# Patient Record
Sex: FEMALE | Race: WHITE | Employment: FULL TIME | URBAN - METROPOLITAN AREA
[De-identification: names, ages, dates, MRNs, and addresses within clinical notes are randomized per-mention and may not be internally consistent; named-entity substitution may affect disease eponyms.]

---

## 2017-01-12 RX ORDER — FENOFIBRATE 145 MG/1
145 TABLET, COATED ORAL DAILY
Qty: 90 TABLET | Refills: 3 | Status: SHIPPED | OUTPATIENT
Start: 2017-01-12 | End: 2019-05-14 | Stop reason: ALTCHOICE

## 2017-02-14 ENCOUNTER — OFFICE VISIT (OUTPATIENT)
Dept: CARDIOLOGY CLINIC | Age: 52
End: 2017-02-14

## 2017-02-14 VITALS
SYSTOLIC BLOOD PRESSURE: 98 MMHG | RESPIRATION RATE: 15 BRPM | HEART RATE: 84 BPM | HEIGHT: 65 IN | WEIGHT: 179 LBS | OXYGEN SATURATION: 99 % | DIASTOLIC BLOOD PRESSURE: 56 MMHG | BODY MASS INDEX: 29.82 KG/M2

## 2017-02-14 DIAGNOSIS — I10 ESSENTIAL HYPERTENSION: ICD-10-CM

## 2017-02-14 DIAGNOSIS — I50.32 DIASTOLIC CHF, CHRONIC (HCC): Primary | ICD-10-CM

## 2017-02-14 DIAGNOSIS — I27.20 PULMONARY HYPERTENSION (HCC): ICD-10-CM

## 2017-02-14 PROCEDURE — 99214 OFFICE O/P EST MOD 30 MIN: CPT | Performed by: NURSE PRACTITIONER

## 2017-02-14 RX ORDER — SPIRONOLACTONE 50 MG/1
25 TABLET, FILM COATED ORAL DAILY
Qty: 90 TABLET | Refills: 3 | COMMUNITY
Start: 2017-02-14 | End: 2017-08-21 | Stop reason: ALTCHOICE

## 2017-02-15 ENCOUNTER — TELEPHONE (OUTPATIENT)
Dept: CARDIOLOGY CLINIC | Age: 52
End: 2017-02-15

## 2017-02-15 RX ORDER — VALSARTAN 40 MG/1
20 TABLET ORAL NIGHTLY
Qty: 30 TABLET | Refills: 3 | COMMUNITY
Start: 2017-02-15 | End: 2017-06-08 | Stop reason: ALTCHOICE

## 2017-02-16 RX ORDER — CARVEDILOL 3.12 MG/1
3.12 TABLET ORAL 2 TIMES DAILY WITH MEALS
Qty: 60 TABLET | Refills: 3 | Status: SHIPPED | OUTPATIENT
Start: 2017-02-16 | End: 2017-03-03 | Stop reason: DRUGHIGH

## 2017-03-03 ENCOUNTER — TELEPHONE (OUTPATIENT)
Dept: CARDIOLOGY CLINIC | Age: 52
End: 2017-03-03

## 2017-03-03 RX ORDER — CARVEDILOL 6.25 MG/1
6.25 TABLET ORAL 2 TIMES DAILY WITH MEALS
Qty: 60 TABLET | Refills: 2 | Status: SHIPPED
Start: 2017-03-03 | End: 2018-08-24 | Stop reason: SDUPTHER

## 2017-05-08 ENCOUNTER — OFFICE VISIT (OUTPATIENT)
Dept: NEUROLOGY | Age: 52
End: 2017-05-08

## 2017-05-08 VITALS
HEART RATE: 78 BPM | WEIGHT: 175 LBS | HEIGHT: 65 IN | DIASTOLIC BLOOD PRESSURE: 72 MMHG | BODY MASS INDEX: 29.16 KG/M2 | SYSTOLIC BLOOD PRESSURE: 97 MMHG

## 2017-05-08 DIAGNOSIS — E11.42 DIABETIC POLYNEUROPATHY ASSOCIATED WITH TYPE 2 DIABETES MELLITUS (HCC): ICD-10-CM

## 2017-05-08 PROCEDURE — 99213 OFFICE O/P EST LOW 20 MIN: CPT | Performed by: PSYCHIATRY & NEUROLOGY

## 2017-05-08 RX ORDER — TOPIRAMATE 50 MG/1
TABLET, FILM COATED ORAL
Qty: 60 TABLET | Refills: 5 | Status: SHIPPED | OUTPATIENT
Start: 2017-05-08 | End: 2017-06-07 | Stop reason: ALTCHOICE

## 2017-05-18 ENCOUNTER — OFFICE VISIT (OUTPATIENT)
Dept: CARDIOLOGY CLINIC | Age: 52
End: 2017-05-18

## 2017-05-18 VITALS
HEART RATE: 78 BPM | DIASTOLIC BLOOD PRESSURE: 62 MMHG | BODY MASS INDEX: 28.96 KG/M2 | HEIGHT: 65 IN | WEIGHT: 173.8 LBS | OXYGEN SATURATION: 99 % | RESPIRATION RATE: 15 BRPM | SYSTOLIC BLOOD PRESSURE: 120 MMHG

## 2017-05-18 DIAGNOSIS — I50.32 CHRONIC DIASTOLIC HEART FAILURE (HCC): Primary | ICD-10-CM

## 2017-05-18 DIAGNOSIS — E78.2 MIXED HYPERLIPIDEMIA: ICD-10-CM

## 2017-05-18 DIAGNOSIS — I10 ESSENTIAL HYPERTENSION: ICD-10-CM

## 2017-05-18 PROCEDURE — 99214 OFFICE O/P EST MOD 30 MIN: CPT | Performed by: NURSE PRACTITIONER

## 2017-05-30 ENCOUNTER — TELEPHONE (OUTPATIENT)
Dept: CARDIOLOGY CLINIC | Age: 52
End: 2017-05-30

## 2017-06-08 RX ORDER — TOPIRAMATE 50 MG/1
50 TABLET, FILM COATED ORAL 2 TIMES DAILY
COMMUNITY
Start: 2017-06-08 | End: 2017-12-19 | Stop reason: SDUPTHER

## 2017-06-09 ENCOUNTER — TELEPHONE (OUTPATIENT)
Dept: CARDIOLOGY CLINIC | Age: 52
End: 2017-06-09

## 2017-06-12 ENCOUNTER — HOSPITAL ENCOUNTER (OUTPATIENT)
Dept: OTHER | Age: 52
Discharge: HOME | End: 2017-06-12
Attending: NURSE PRACTITIONER | Admitting: NURSE PRACTITIONER

## 2017-06-12 ENCOUNTER — OFFICE VISIT (OUTPATIENT)
Dept: CARDIOLOGY CLINIC | Age: 52
End: 2017-06-12

## 2017-06-12 VITALS
HEART RATE: 75 BPM | SYSTOLIC BLOOD PRESSURE: 112 MMHG | DIASTOLIC BLOOD PRESSURE: 70 MMHG | WEIGHT: 176 LBS | BODY MASS INDEX: 29.29 KG/M2

## 2017-06-12 DIAGNOSIS — R06.02 SHORTNESS OF BREATH: ICD-10-CM

## 2017-06-12 DIAGNOSIS — I10 ESSENTIAL HYPERTENSION: ICD-10-CM

## 2017-06-12 DIAGNOSIS — I50.32 DIASTOLIC CHF, CHRONIC (HCC): ICD-10-CM

## 2017-06-12 DIAGNOSIS — R06.02 SHORTNESS OF BREATH: Primary | ICD-10-CM

## 2017-06-12 DIAGNOSIS — E78.2 MIXED HYPERLIPIDEMIA: ICD-10-CM

## 2017-06-12 DIAGNOSIS — R07.9 CHEST PAIN, UNSPECIFIED TYPE: ICD-10-CM

## 2017-06-12 LAB
ANION GAP SERPL CALCULATED.3IONS-SCNC: 14 MMOL/L (ref 3–16)
BUN BLDV-MCNC: 22 MG/DL (ref 7–20)
CALCIUM SERPL-MCNC: 9.2 MG/DL (ref 8.3–10.6)
CHLORIDE BLD-SCNC: 99 MMOL/L (ref 99–110)
CO2: 25 MMOL/L (ref 21–32)
CREAT SERPL-MCNC: 1 MG/DL (ref 0.6–1.1)
GFR AFRICAN AMERICAN: >60
GFR NON-AFRICAN AMERICAN: 59
GLUCOSE BLD-MCNC: 92 MG/DL (ref 70–99)
POTASSIUM SERPL-SCNC: 3.6 MMOL/L (ref 3.5–5.1)
PRO-BNP: 98 PG/ML (ref 0–124)
SODIUM BLD-SCNC: 138 MMOL/L (ref 136–145)

## 2017-06-12 PROCEDURE — 93000 ELECTROCARDIOGRAM COMPLETE: CPT | Performed by: NURSE PRACTITIONER

## 2017-06-12 PROCEDURE — 99214 OFFICE O/P EST MOD 30 MIN: CPT | Performed by: NURSE PRACTITIONER

## 2017-06-13 ENCOUNTER — TELEPHONE (OUTPATIENT)
Dept: CARDIOLOGY CLINIC | Age: 52
End: 2017-06-13

## 2017-06-16 ENCOUNTER — TELEPHONE (OUTPATIENT)
Dept: CARDIOLOGY CLINIC | Age: 52
End: 2017-06-16

## 2017-08-21 ENCOUNTER — OFFICE VISIT (OUTPATIENT)
Dept: CARDIOLOGY CLINIC | Age: 52
End: 2017-08-21

## 2017-08-21 VITALS
DIASTOLIC BLOOD PRESSURE: 72 MMHG | HEART RATE: 84 BPM | WEIGHT: 173 LBS | BODY MASS INDEX: 28.82 KG/M2 | SYSTOLIC BLOOD PRESSURE: 100 MMHG | HEIGHT: 65 IN

## 2017-08-21 DIAGNOSIS — E78.00 PURE HYPERCHOLESTEROLEMIA: ICD-10-CM

## 2017-08-21 DIAGNOSIS — R06.02 SOB (SHORTNESS OF BREATH): ICD-10-CM

## 2017-08-21 DIAGNOSIS — I50.32 DIASTOLIC CHF, CHRONIC (HCC): Primary | ICD-10-CM

## 2017-08-21 DIAGNOSIS — I10 ESSENTIAL HYPERTENSION: ICD-10-CM

## 2017-08-21 PROCEDURE — 99214 OFFICE O/P EST MOD 30 MIN: CPT | Performed by: INTERNAL MEDICINE

## 2017-08-21 RX ORDER — EZETIMIBE 10 MG/1
10 TABLET ORAL DAILY
Qty: 30 TABLET | Refills: 11 | Status: SHIPPED | OUTPATIENT
Start: 2017-08-21 | End: 2018-09-07 | Stop reason: SDUPTHER

## 2017-08-21 RX ORDER — SPIRONOLACTONE 50 MG/1
50 TABLET, FILM COATED ORAL DAILY
Qty: 30 TABLET | Refills: 11 | Status: SHIPPED | OUTPATIENT
Start: 2017-08-21 | End: 2018-03-14 | Stop reason: SDUPTHER

## 2017-08-21 RX ORDER — VALSARTAN 40 MG/1
20 TABLET ORAL NIGHTLY
Qty: 30 TABLET | Refills: 11 | Status: SHIPPED | OUTPATIENT
Start: 2017-08-21 | End: 2018-08-24

## 2017-08-21 RX ORDER — VALSARTAN 40 MG/1
20 TABLET ORAL NIGHTLY
Qty: 30 TABLET | Refills: 11 | Status: SHIPPED | OUTPATIENT
Start: 2017-08-21 | End: 2017-08-21 | Stop reason: SDUPTHER

## 2017-08-25 ENCOUNTER — HOSPITAL ENCOUNTER (OUTPATIENT)
Dept: NON INVASIVE DIAGNOSTICS | Age: 52
Discharge: HOME | End: 2017-08-25
Attending: INTERNAL MEDICINE | Admitting: INTERNAL MEDICINE

## 2017-08-25 DIAGNOSIS — R06.02 SHORTNESS OF BREATH: ICD-10-CM

## 2017-08-25 LAB
LEFT VENTRICULAR EJECTION FRACTION HIGH VALUE: 60 %
LEFT VENTRICULAR EJECTION FRACTION MODE: NORMAL
LV EF: 55 %
LV EF: 58 %
LVEF MODALITY: NORMAL

## 2017-08-30 ENCOUNTER — TELEPHONE (OUTPATIENT)
Dept: CARDIOLOGY CLINIC | Age: 52
End: 2017-08-30

## 2017-10-06 ENCOUNTER — OFFICE VISIT (OUTPATIENT)
Dept: CARDIOLOGY CLINIC | Age: 52
End: 2017-10-06

## 2017-10-06 VITALS
HEART RATE: 80 BPM | SYSTOLIC BLOOD PRESSURE: 118 MMHG | DIASTOLIC BLOOD PRESSURE: 76 MMHG | BODY MASS INDEX: 28.32 KG/M2 | HEIGHT: 65 IN | WEIGHT: 170 LBS

## 2017-10-06 DIAGNOSIS — I50.32 DIASTOLIC CHF, CHRONIC (HCC): Primary | ICD-10-CM

## 2017-10-06 DIAGNOSIS — R06.02 SOB (SHORTNESS OF BREATH): ICD-10-CM

## 2017-10-06 DIAGNOSIS — I27.20 PULMONARY HYPERTENSION (HCC): ICD-10-CM

## 2017-10-06 DIAGNOSIS — I10 ESSENTIAL HYPERTENSION: ICD-10-CM

## 2017-10-06 DIAGNOSIS — E87.6 HYPOKALEMIA: ICD-10-CM

## 2017-10-06 DIAGNOSIS — R06.02 SHORTNESS OF BREATH: ICD-10-CM

## 2017-10-06 DIAGNOSIS — E78.2 MIXED HYPERLIPIDEMIA: ICD-10-CM

## 2017-10-06 DIAGNOSIS — E78.00 PURE HYPERCHOLESTEROLEMIA: ICD-10-CM

## 2017-10-06 PROCEDURE — 99214 OFFICE O/P EST MOD 30 MIN: CPT | Performed by: INTERNAL MEDICINE

## 2017-10-06 RX ORDER — POTASSIUM CHLORIDE 750 MG/1
TABLET, EXTENDED RELEASE ORAL
Qty: 180 TABLET | Refills: 3 | Status: SHIPPED | OUTPATIENT
Start: 2017-10-06 | End: 2018-10-21 | Stop reason: SDUPTHER

## 2017-10-06 NOTE — LETTER
[address]  [address]  [address] [address]  Phone: [phone]  Fax: [phone]    Tc Cross MD        November 9, 2017     [address] [facility] [address]    Patient: Jannice Prader  MR Number: [id number]  YOB: 1965  Date of Visit: 10/6/2017        [address]   Advanced Heart Failure/Pulmonary Hypertension  Cardiac Follow up       Jannice Prader  YOB: 1965    Date of Visit:  10/6/17      Chief Complaint   Patient presents with    Fatigue     extremely fatigued all week     History of Present Illness:  Jannice Prader is a 47 y.o. female with past medical history significant for dCHF, PHTN, NIRALI, HTN and CKD. She was last seen in office 5/18 and Valsartan discontinued secondary symptomatic hypotension with it's use (97/52). Today, she states that she has lost weight ~ twenty pounds over the past year according to record in epic but continues to have shortness of breath with exertion. In the past her valsartan was stopped for a short time and her heart rate got too high and it was restarted. Last visit ~ 1 1/2 months ago  (8/21) her potassium was 3.4 on the low side and we increased her spironolactone to 50 mg daily, her 9/1/ labs shows that potassium was up to 3.5. She sees Dr. Abelardo Snyder neurology for headaches. She is still fatigue. She is taking potassium 10 meq daily. Remains on all medications, no change since last visit including continuing valsartan and lasix. Her valsartan was stopped for a short time and her heart rate got too high and it was restarted.        Allergies   Allergen Reactions    Lisinopril Other (See Comments)     cough    Statins Other (See Comments)     Light-headedness, cp, fast HR    Dilaudid [Hydromorphone Hcl] Nausea And Vomiting    Skelaxin [Metaxalone] Palpitations     Heart beats fast     Current Outpatient Prescriptions Medication Sig Dispense Refill    ezetimibe (ZETIA) 10 MG tablet Take 1 tablet by mouth daily 30 tablet 11    valsartan (DIOVAN) 40 MG tablet Take 0.5 tablets by mouth nightly 30 tablet 11    spironolactone (ALDACTONE) 50 MG tablet Take 1 tablet by mouth daily 30 tablet 11    topiramate (TOPAMAX) 50 MG tablet Take 1 tablet by mouth 2 times daily      carvedilol (COREG) 6.25 MG tablet Take 1 tablet by mouth 2 times daily (with meals) 60 tablet 2    fenofibrate (TRICOR) 145 MG tablet Take 1 tablet by mouth daily 90 tablet 3    potassium chloride (KLOR-CON M) 10 MEQ extended release tablet Take 1 tablet by mouth daily (Patient taking differently: Take 10 mEq by mouth daily Indications: this week taking 20 mEq daily ) 90 tablet 3    furosemide (LASIX) 40 MG tablet Take 1 tablet by mouth daily (Patient taking differently: Take 20 mg by mouth Five times weekly ) 30 tablet 11    nortriptyline (PAMELOR) 10 MG capsule Take 10 mg by mouth 2 times daily      Liraglutide (VICTOZA) 18 MG/3ML SOPN SC injection Inject 1.8 mg into the skin daily       metformin (GLUCOPHAGE) 1000 MG tablet Take 1 tablet by mouth 2 times daily (with meals). 60 tablet 6     No current facility-administered medications for this visit.         Past Medical History:   Diagnosis Date    Abdominal pain 9/9/2010    Arthritis     Asthma     Chest pain 6/22/2011    CHF (congestive heart failure) (HCC)     Diastolic heart failure     5/17/10-heart worsening-Dr. Peter Singleton    Dyspnea     Heart failure, diastolic (HCC)     Hemoptysis 5/14/2012    Hyperlipidemia     Hypertension     Nausea & vomiting     PONV (postoperative nausea and vomiting)     Pulmonary hypertension (HCC)     Pulmonary hypertension due to left ventricular diastolic dysfunction (HCC)     Renal failure     Sleep apnea     SUPPOSED TO USE CPAP    Vertigo      Past Surgical History:   Procedure Laterality Date    BREAST CYST EXCISION Right 03/28/2017 end of march  Sexual activity: Yes     Partners: Male     Other Topics Concern    Not on file     Social History Narrative       Review of Systems:   · Constitutional: there has been no unanticipated weight loss. There's been no change in energy level, sleep pattern, or activity level. · Eyes: No visual changes or diplopia. No scleral icterus. · ENT: No Headaches, hearing loss or vertigo. No mouth sores or sore throat. · Cardiovascular: Reviewed in HPI  · Respiratory: No cough or wheezing, no sputum production. No hematemesis. · Gastrointestinal: No abdominal pain, appetite loss, blood in stools. No change in bowel or bladder habits. · Genitourinary: No dysuria, trouble voiding, or hematuria. · Musculoskeletal:  No gait disturbance, weakness or joint complaints. · Integumentary: No rash or pruritis. · Neurological: No headache, diplopia, change in muscle strength, numbness or tingling. No change in gait, balance, coordination, mood, affect, memory, mentation, behavior. · Psychiatric: No anxiety, no depression. · Endocrine: No malaise, fatigue or temperature intolerance. No excessive thirst, fluid intake, or urination. No tremor. · Hematologic/Lymphatic: No abnormal bruising or bleeding, blood clots or swollen lymph nodes. · Allergic/Immunologic: No nasal congestion or hives.     Physical Examination:      Wt Readings from Last 3 Encounters:   08/21/17 173 lb (78.5 kg)   06/12/17 176 lb (79.8 kg)   05/18/17 173 lb 12.8 oz (78.8 kg)     BP Readings from Last 3 Encounters:   08/21/17 100/72   06/12/17 112/70   05/18/17 120/62     Constitutional and General Appearance:   WD/WN in NAD  HEENT:  NC/AT  Skin:  Warm, dry  Respiratory:  · Normal excursion and expansion without use of accessory muscles  · Resp Auscultation: Normal breath sounds without dullness  Cardiovascular:  · The apical impulses not displaced  · Heart tones are crisp and normal  · Cervical veins are not engorged 4. CAD patient on anti-platelet: NA  5. CAD patient on STATIN therapy:  Yes  6. Patient with CHF and aFib on anticoagulation:  NA      I appreciate the opportunity of cooperating in the care of this patient. Dameon Chatman M.D., [facility] [place]           If you have questions, please do not hesitate to call me. I look forward to following Pravin Dietrich along with you.     Sincerely,        Altagracia Shane MD

## 2017-10-06 NOTE — PROGRESS NOTES
[address]   Advanced Heart Failure/Pulmonary Hypertension  Cardiac Follow up       Shahid Salas  YOB: 1965    Date of Visit:  10/6/17      Chief Complaint   Patient presents with    Fatigue     extremely fatigued all week     History of Present Illness:  Shahid Salas is a 47 y.o. female with past medical history significant for dCHF, PHTN, NIRALI, HTN and CKD. She was last seen in office 5/18 and Valsartan discontinued secondary symptomatic hypotension with it's use (97/52). Today, she states that she has lost weight ~ twenty pounds over the past year according to record in epic but continues to have shortness of breath with exertion. In the past her valsartan was stopped for a short time and her heart rate got too high and it was restarted. Last visit ~ 1 1/2 months ago  (8/21) her potassium was 3.4 on the low side and we increased her spironolactone to 50 mg daily, her 9/1/ labs shows that potassium was up to 3.5. She sees Dr. Danica Carrion neurology for headaches. She is still fatigue. She is taking potassium 10 meq daily. Remains on all medications, no change since last visit including continuing valsartan and lasix. Her valsartan was stopped for a short time and her heart rate got too high and it was restarted.        Allergies   Allergen Reactions    Lisinopril Other (See Comments)     cough    Statins Other (See Comments)     Light-headedness, cp, fast HR    Dilaudid [Hydromorphone Hcl] Nausea And Vomiting    Skelaxin [Metaxalone] Palpitations     Heart beats fast     Current Outpatient Prescriptions   Medication Sig Dispense Refill    ezetimibe (ZETIA) 10 MG tablet Take 1 tablet by mouth daily 30 tablet 11    valsartan (DIOVAN) 40 MG tablet Take 0.5 tablets by mouth nightly 30 tablet 11    spironolactone (ALDACTONE) 50 MG tablet Take 1 tablet by mouth daily 30 tablet 11    topiramate (TOPAMAX) 50 MG tablet Take 1 tablet by mouth 2 times daily      carvedilol (COREG) 6.25 MG tablet Take 1 tablet by mouth 2 times daily (with meals) 60 tablet 2    fenofibrate (TRICOR) 145 MG tablet Take 1 tablet by mouth daily 90 tablet 3    potassium chloride (KLOR-CON M) 10 MEQ extended release tablet Take 1 tablet by mouth daily (Patient taking differently: Take 10 mEq by mouth daily Indications: this week taking 20 mEq daily ) 90 tablet 3    furosemide (LASIX) 40 MG tablet Take 1 tablet by mouth daily (Patient taking differently: Take 20 mg by mouth Five times weekly ) 30 tablet 11    nortriptyline (PAMELOR) 10 MG capsule Take 10 mg by mouth 2 times daily      Liraglutide (VICTOZA) 18 MG/3ML SOPN SC injection Inject 1.8 mg into the skin daily       metformin (GLUCOPHAGE) 1000 MG tablet Take 1 tablet by mouth 2 times daily (with meals). 60 tablet 6     No current facility-administered medications for this visit.         Past Medical History:   Diagnosis Date    Abdominal pain 2010    Arthritis     Asthma     Chest pain 2011    CHF [facility]congestive heart failure) ([facility])     Diastolic heart failure     5/17/10-heart worsening-Dr. Hyatt Handsshefali    Dyspnea     Heart failure, diastolic (HCC)     Hemoptysis 2012    Hyperlipidemia     Hypertension     Nausea & vomiting     PONV (postoperative nausea and vomiting)     Pulmonary hypertension ([address]     Pulmonary hypertension due to left ventricular diastolic dysfunction ([address]     Renal failure     Sleep apnea     SUPPOSED TO USE CPAP    Vertigo      Past Surgical History:   Procedure Laterality Date    BREAST CYST EXCISION Right 2017    end     BREAST SURGERY      breast reduction    BRONCHOSCOPY  12    BRONCHOSCOPY  2016    w/ washing     SECTION      CHOLECYSTECTOMY      HERNIA REPAIR      HYSTERECTOMY  2015    LAPAROSCOPIC APPENDECTOMY  6/3/11    LAPAROSCOPY  2011    operative, with lysis of adhesions    NEUROMA SURGERY      OTHER SURGICAL HISTORY      3 rt heart caths and 2 left heart caths    SALPINGO-OOPHORECTOMY  6/3/11    Laparoscopic Right    SHOULDER SURGERY      UPPER GASTROINTESTINAL ENDOSCOPY  9/13/11    biopsy    UPPER GASTROINTESTINAL ENDOSCOPY  9/14/2011    EUS     Family History   Problem Relation Age of Onset    Cancer Father      lung    Hypertension Father     High Blood Pressure Father     Hypertension Brother     High Blood Pressure Brother     Hypertension Mother    Karen Fuentes Other Mother      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/2009    Heart Disease Mother     High Blood Pressure Mother     Cancer Paternal Grandmother     Hypertension Other      aunt    Stroke Maternal Grandfather     Cancer Maternal Aunt      breast    High Blood Pressure Maternal Aunt     Cancer Paternal Aunt      lung    Cancer Paternal Uncle      brain    Heart Disease Maternal Grandmother     High Blood Pressure Maternal Grandmother     Heart Disease Paternal Grandfather     Diabetes Neg Hx     Osteoporosis Neg Hx     Thyroid Disease Neg Hx     Anesth Problems Neg Hx     Malig Hyperten Neg Hx     Hypotension Neg Hx     Malig Hypertherm Neg Hx     Pseudochol. Deficiency Neg Hx      Social History     Social History    Marital status:      Spouse name: N/A    Number of children: N/A    Years of education: N/A     Occupational History    Not on file. Social History Main Topics    Smoking status: Never Smoker    Smokeless tobacco: Never Used    Alcohol use 0.0 oz/week     0 Standard drinks or equivalent per week      Comment: rarely    Drug use: No    Sexual activity: Yes     Partners: Male     Other Topics Concern    Not on file     Social History Narrative       Review of Systems:   · Constitutional: there has been no unanticipated weight loss. There's been no change in energy level, sleep pattern, or activity level. · Eyes: No visual changes or diplopia. No scleral icterus. · ENT: No Headaches, hearing loss or vertigo.  No mouth sores or sore throat. · Cardiovascular: Reviewed in HPI  · Respiratory: No cough or wheezing, no sputum production. No hematemesis. · Gastrointestinal: No abdominal pain, appetite loss, blood in stools. No change in bowel or bladder habits. · Genitourinary: No dysuria, trouble voiding, or hematuria. · Musculoskeletal:  No gait disturbance, weakness or joint complaints. · Integumentary: No rash or pruritis. · Neurological: No headache, diplopia, change in muscle strength, numbness or tingling. No change in gait, balance, coordination, mood, affect, memory, mentation, behavior. · Psychiatric: No anxiety, no depression. · Endocrine: No malaise, fatigue or temperature intolerance. No excessive thirst, fluid intake, or urination. No tremor. · Hematologic/Lymphatic: No abnormal bruising or bleeding, blood clots or swollen lymph nodes. · Allergic/Immunologic: No nasal congestion or hives. Physical Examination:      Wt Readings from Last 3 Encounters:   08/21/17 173 lb (78.5 kg)   06/12/17 176 lb (79.8 kg)   05/18/17 173 lb 12.8 oz (78.8 kg)     BP Readings from Last 3 Encounters:   08/21/17 100/72   06/12/17 112/70   05/18/17 120/62     Constitutional and General Appearance:   WD/WN in NAD  HEENT:  NC/AT  Skin:  Warm, dry  Respiratory:  · Normal excursion and expansion without use of accessory muscles  · Resp Auscultation: Normal breath sounds without dullness  Cardiovascular:  · The apical impulses not displaced  · Heart tones are crisp and normal  · Cervical veins are not engorged  · The carotid upstroke is normal in amplitude and contour without delay or bruit  · JVP less than 8 cm H2O  RRR with nl S1 and S2 without m,r,g  · Peripheral pulses are symmetrical and full  · There is no clubbing, cyanosis of the extremities.   · No edema  · Femoral Arteries: 2+ and equal  · Pedal Pulses: 2+ and equal   Neck:  · JVP less than 8 cm H2O  · No thyromegaly  Abdomen:  · No masses or tenderness  · Liver/Spleen: No Abnormalities Noted  Neurological/Psychiatric:  · Alert and oriented in all spheres  · Moves all extremities well  · Exhibits normal gait balance and coordination  · No abnormalities of mood, affect, memory, mentation, or behavior are noted     Echo 8/26/2016:  Summary  Normal left ventricle size, wall thickness and systolic function with an EF of 55%. Reversal of E/A inflow velocities across the mitral valve suggesting impaired left ventricular relaxation.     Cardiac Cath 5/8/14:  LM-normal  LAD-normal  Cx- normal  RCA-normal, dominant   LVEF-55%     Hemodynamics: RA mean 4  RV 32/0  PA 32/11 mean 19  PAW 10/9 mean 5  Thermal: C.O. 4.06 and C.I. 2.05  Glenn: C.O. 4.96 and C.I. 2.51         Assessment:  1. Chronic diastolic CHF. NYHA class III. Does not appear grossly volume overloaded. 2. Dyspnea/chest discomfort. Breathing unchanged but stable. 3. Hypertension. Borderline low. Denies dizziness of LH.    4.  Hyperlipidemia. Intolerant of statins. On Zetia and fenofibrate.  on recent labs. Wexploring if she is candidate for PCSK9 inhibitors. 5. Hypokalemia--Will increase potassium 10 meq to twice daily. Repeat bmp in 2 months. Plan: Stable cardiac status. Increase potassium to10 meq from once daily to twice daily. Bmp in two months. Return to office in four months. QUALITY MEASURES  1. Tobacco Cessation Counseling: NA  2. Retake of BP if >140/90:   NA  3. Documentation to PCP/referring for new patient:  Sent to PCP at close of office visit  4. CAD patient on anti-platelet: NA  5. CAD patient on STATIN therapy:  Yes  6. Patient with CHF and aFib on anticoagulation:  NA      I appreciate the opportunity of cooperating in the care of this patient.     Susi Quezada M.D., [facility] [place]

## 2017-10-06 NOTE — PATIENT INSTRUCTIONS
Increase potassium to 10 meq twice daily. Bmp in two months, slip given. Return to office in four months.

## 2017-10-06 NOTE — MR AVS SNAPSHOT
After Visit Summary             Harper Rossana   10/6/2017 10:15 AM   Office Visit    Description:  Female : 1965   Provider:  Capri Enriquez MD   Department:  [address] and Future Appointments         Below is a list of your follow-up and future appointments. This may not be a complete list as you may have made appointments directly with providers that we are not aware of or your providers may have made some for you. Please call your providers to confirm appointments. It is important to keep your appointments. Please bring your current insurance card, photo ID, co-pay, and all medication bottles to your appointment. If self-pay, payment is expected at the time of service. Your To-Do List     Future Appointments Provider Department Dept Phone    2017 4:00 PM Bard Jon MD [facility] Neurology [phone]    Please arrive 15 minutes prior to appointment, bring photo ID and insurance card. Follow-Up    Return in about 4 months (around 2018). Information from Your Visit        Department     Name Address Phone Fax    [address] Cardiology - [facility] [address] [facility]  [address] [facility] [address]  [facility] [address] [phone]      You Were Seen for:         Comments    [facility]   [[id number][facility]         Vital Signs     Blood Pressure Pulse Height Weight Body Mass Index Smoking Status    118/76 80 5' 5\" (1.651 m) 170 lb (77.1 kg) 28.29 kg/m2 Never Smoker      Additional Information about your Body Mass Index (BMI)           Your BMI as listed above is considered overweight (25.0-29.9). BMI is an estimate of body fat, calculated from your height and weight. The higher your BMI, the greater your risk of heart disease, high blood pressure, type 2 diabetes, stroke, gallstones, arthritis, sleep apnea, and certain cancers. BMI is not perfect.   It may overestimate body fat in athletes and Yearly Flu Vaccine (1) 9/1/2017    Mammograms are recommended every 2 years for low/average risk patients aged 48 - 69, and every year for high risk patients per updated national guidelines. However these guidelines can be individualized by your provider. 12/22/2018            [employer] Signup           Our records indicate that you have an active [employer] account. You can view your After Visit Summary by going to [email or web address] and logging in with your [email or web address] username and password. If you don't have a [email or web address] username and password but a parent or guardian has access to your record, the parent or guardian should login with their own [email or web address] username and password and access your record to view the After Visit Summary. Additional Information  If you have questions, please contact the physician practice where you receive care. Remember, [employer] is NOT to be used for urgent needs. For medical emergencies, dial 911. For questions regarding your [employer] account call [phone]. If you have a clinical question, please call your doctor's office.

## 2017-10-09 RX ORDER — FUROSEMIDE 40 MG/1
TABLET ORAL
Qty: 30 TABLET | Refills: 10 | Status: SHIPPED | OUTPATIENT
Start: 2017-10-09 | End: 2018-09-02 | Stop reason: SDUPTHER

## 2017-10-24 ENCOUNTER — TELEPHONE (OUTPATIENT)
Dept: CARDIOLOGY CLINIC | Age: 52
End: 2017-10-24

## 2017-10-24 NOTE — TELEPHONE ENCOUNTER
Pt called to adv that orthopedics office will be faxing over a release form for her to have rotator cuff surgery. Pt wants to know if it could possibly be filled out today so that she can get appt sched. Please call to adv thank you.

## 2017-11-09 NOTE — COMMUNICATION BODY
6.25 MG tablet Take 1 tablet by mouth 2 times daily (with meals) 60 tablet 2    fenofibrate (TRICOR) 145 MG tablet Take 1 tablet by mouth daily 90 tablet 3    potassium chloride (KLOR-CON M) 10 MEQ extended release tablet Take 1 tablet by mouth daily (Patient taking differently: Take 10 mEq by mouth daily Indications: this week taking 20 mEq daily ) 90 tablet 3    furosemide (LASIX) 40 MG tablet Take 1 tablet by mouth daily (Patient taking differently: Take 20 mg by mouth Five times weekly ) 30 tablet 11    nortriptyline (PAMELOR) 10 MG capsule Take 10 mg by mouth 2 times daily      Liraglutide (VICTOZA) 18 MG/3ML SOPN SC injection Inject 1.8 mg into the skin daily       metformin (GLUCOPHAGE) 1000 MG tablet Take 1 tablet by mouth 2 times daily (with meals). 60 tablet 6     No current facility-administered medications for this visit.         Past Medical History:   Diagnosis Date    Abdominal pain 2010    Arthritis     Asthma     Chest pain 2011    [facility]     Diastolic heart failure     5/17/10-heart worsening-Dr. Maria R Rivers    Dyspnea     Heart failure, diastolic (HCC)     Hemoptysis 2012    Hyperlipidemia     Hypertension     Nausea & vomiting     PONV (postoperative nausea and vomiting)     Pulmonary hypertension ([address]     Pulmonary hypertension due to left ventricular diastolic dysfunction ([address]     Renal failure     Sleep apnea     SUPPOSED TO USE CPAP    Vertigo      Past Surgical History:   Procedure Laterality Date    BREAST CYST EXCISION Right 2017    end     BREAST SURGERY      breast reduction    BRONCHOSCOPY  12    BRONCHOSCOPY  2016    w/ washing     SECTION      CHOLECYSTECTOMY      HERNIA REPAIR      HYSTERECTOMY  2015    LAPAROSCOPIC APPENDECTOMY  6/3/11    LAPAROSCOPY  2011    operative, with lysis of adhesions    NEUROMA SURGERY      OTHER SURGICAL HISTORY      3 rt heart Abnormalities Noted  Neurological/Psychiatric:  · Alert and oriented in all spheres  · Moves all extremities well  · Exhibits normal gait balance and coordination  · No abnormalities of mood, affect, memory, mentation, or behavior are noted     Echo 8/26/2016:  Summary  Normal left ventricle size, wall thickness and systolic function with an EF of 55%. Reversal of E/A inflow velocities across the mitral valve suggesting impaired left ventricular relaxation.     Cardiac Cath 5/8/14:  LM-normal  LAD-normal  Cx- normal  RCA-normal, dominant   LVEF-55%     Hemodynamics: RA mean 4  RV 32/0  PA 32/11 mean 19  PAW 10/9 mean 5  Thermal: C.O. 4.06 and C.I. 2.05  Glenn: C.O. 4.96 and C.I. 2.51         Assessment:  1. Chronic diastolic CHF. NYHA class III. Does not appear grossly volume overloaded. 2. Dyspnea/chest discomfort. Breathing unchanged but stable. 3. Hypertension. Borderline low. Denies dizziness of LH.    4.  Hyperlipidemia. Intolerant of statins. On Zetia and fenofibrate.  on recent labs. Wexploring if she is candidate for PCSK9 inhibitors. 5. Hypokalemia--Will increase potassium 10 meq to twice daily. Repeat bmp in 2 months. Plan: Stable cardiac status. Increase potassium to10 meq from once daily to twice daily. Bmp in two months. Return to office in four months. QUALITY MEASURES  1. Tobacco Cessation Counseling: NA  2. Retake of BP if >140/90:   NA  3. Documentation to PCP/referring for new patient:  Sent to PCP at close of office visit  4. CAD patient on anti-platelet: NA  5. CAD patient on STATIN therapy:  Yes  6. Patient with CHF and aFib on anticoagulation:  NA      I appreciate the opportunity of cooperating in the care of this patient.     Cathryn Simmons M.D., [facility] [place]

## 2017-12-19 ENCOUNTER — OFFICE VISIT (OUTPATIENT)
Dept: NEUROLOGY | Age: 52
End: 2017-12-19

## 2017-12-19 VITALS
HEIGHT: 65 IN | BODY MASS INDEX: 28.32 KG/M2 | DIASTOLIC BLOOD PRESSURE: 75 MMHG | WEIGHT: 170 LBS | HEART RATE: 83 BPM | SYSTOLIC BLOOD PRESSURE: 105 MMHG

## 2017-12-19 PROCEDURE — 99213 OFFICE O/P EST LOW 20 MIN: CPT | Performed by: PSYCHIATRY & NEUROLOGY

## 2017-12-19 RX ORDER — TOPIRAMATE 50 MG/1
TABLET, FILM COATED ORAL
Qty: 60 TABLET | Refills: 5 | Status: SHIPPED | OUTPATIENT
Start: 2017-12-19 | End: 2018-08-26 | Stop reason: SDUPTHER

## 2017-12-19 NOTE — PROGRESS NOTES
Blood Pressure Maternal Aunt     Cancer Paternal Aunt      lung    Cancer Paternal Uncle      brain    Heart Disease Maternal Grandmother     High Blood Pressure Maternal Grandmother     Heart Disease Paternal Grandfather     Diabetes Neg Hx     Osteoporosis Neg Hx     Thyroid Disease Neg Hx     Anesth Problems Neg Hx     Malig Hyperten Neg Hx     Hypotension Neg Hx     Malig Hypertherm Neg Hx     Pseudochol. Deficiency Neg Hx      Social History     Social History    Marital status:      Spouse name: N/A    Number of children: N/A    Years of education: N/A     Social History Main Topics    Smoking status: Never Smoker    Smokeless tobacco: Never Used    Alcohol use 0.0 oz/week      Comment: rarely    Drug use: No    Sexual activity: Yes     Partners: Male     Other Topics Concern    None     Social History Narrative    None        Objective:  Exam:  /75   Pulse 83   Ht 5' 5\" (1.651 m)   Wt 170 lb (77.1 kg)   BMI 28.29 kg/m²   This is a well-nourished patient in no acute distress  Patient is awake, alert and oriented x3. Speech is normal.  Pupils are equal round reacting to light. Extraocular movements intact. Face symmetrical. Tongue midline. Motor exam shows normal symmetrical strength. Deep tendon reflexes decreased in the lower extremities. Plantar reflexes downgoing. Sensory exam shows decreased light touch in the distal lower extremities Coordination normal. Gait normal. No carotid bruit. No neck stiffness.         Data :  LABS:  General Labs:    CBC:   Lab Results   Component Value Date    WBC 5.5 04/08/2016    RBC 4.11 04/08/2016    HGB 12.0 04/08/2016    HCT 35.1 04/08/2016    MCV 85.4 04/08/2016    MCH 29.1 04/08/2016    MCHC 34.1 04/08/2016    RDW 12.4 04/08/2016     04/08/2016    MPV 8.9 04/08/2016     BMP:    Lab Results   Component Value Date     06/12/2017    K 3.6 06/12/2017    CL 99 06/12/2017    CO2 25 06/12/2017    BUN 22 06/12/2017    LABALBU 4.4 04/08/2016    CREATININE 1.0 06/12/2017    CALCIUM 9.2 06/12/2017    GFRAA >60 06/12/2017    GFRAA >60 04/02/2013    LABGLOM 59 06/12/2017    GLUCOSE 92 06/12/2017    GLUCOSE 192 08/19/2015     RADIOLOGY REVIEW:  MRI brain    Impression :  Migraine headaches improved  Rebound headaches, resolved  Diabetic neuropathy  MRI brain shows nonspecific white matter changes probably related to small vessel disease    Plan :  Discussed with patient  Continue to avoid daily pain medication  Continue to avoid chocolate and nitrite-containing foods  Continue topiramate 50 mg twice daily  Side effects were discussed. Strict control of diabetes was discussed  I will see her back in 6 months for follow-up. At that time we will discuss if we can slowly taper and stop the medication if her headaches remain well controlled        Please note a portion of  this chart was generated using dragon dictation software. Although every effort was made to ensure the accuracy of this automated transcription, some errors in transcription may have occurred.

## 2018-02-09 ENCOUNTER — OFFICE VISIT (OUTPATIENT)
Dept: CARDIOLOGY CLINIC | Age: 53
End: 2018-02-09

## 2018-02-09 VITALS
SYSTOLIC BLOOD PRESSURE: 80 MMHG | WEIGHT: 171 LBS | HEIGHT: 65 IN | HEART RATE: 80 BPM | BODY MASS INDEX: 28.49 KG/M2 | DIASTOLIC BLOOD PRESSURE: 60 MMHG

## 2018-02-09 DIAGNOSIS — R06.02 SOB (SHORTNESS OF BREATH): Chronic | ICD-10-CM

## 2018-02-09 DIAGNOSIS — I10 ESSENTIAL HYPERTENSION: Chronic | ICD-10-CM

## 2018-02-09 DIAGNOSIS — I50.32 DIASTOLIC CHF, CHRONIC (HCC): Primary | Chronic | ICD-10-CM

## 2018-02-09 DIAGNOSIS — E78.00 PURE HYPERCHOLESTEROLEMIA: Chronic | ICD-10-CM

## 2018-02-09 PROCEDURE — 99214 OFFICE O/P EST MOD 30 MIN: CPT | Performed by: INTERNAL MEDICINE

## 2018-02-09 NOTE — LETTER
[address] [address] [place] [address] [address]  Phone: [phone]  Fax: [phone]    Ryan Lake MD        February 14, 2018     [address] [address]    Patient: Queen Terese  MR Number: [id number]  YOB: 1965  Date of Visit: 2/9/2018        [address]   Advanced Heart Failure/Pulmonary Hypertension  Cardiac Follow up       Queen Terese  YOB: 1965    Date of Visit:  2/9/18      Chief Complaint   Patient presents with    Fatigue     Recent shoulder surgery--taking demeral for pain    Shortness of Breath     hard to take deep breaths     History of Present Illness:  Queen Terese is a 47 y.o. female with past medical history significant for dCHF, PHTN, NIRALI, HTN and CKD. She was last seen in office 5/18 and Valsartan discontinued secondary symptomatic hypotension with it's use (97/52). She had shoulder surgery  1/10/18, her nerve block did not work, she has had sever pain. She is doing physical therapy. She takes demerol at bedtime for the past five days but it doesn't help. Since surgery she she thinks that her shoulder is still \"frozen\". She had labwork a week ago at [facility] and we reviewed them, her potassium is better and cholesterol is improving with her ldl, TG are down to normal now. She takes Zetia and tolerating it well. All other labs look good. Her BP is on the low side and we think iyt maybe due to pain medication. She continues to have shortness of breath with exertion, no worse. She sees Dr. Daria Buerger neurology for headaches. She is still fatigue. She is taking potassium 10 meq daily. Remains on all medications, no change since last visit including continuing valsartan and lasix. Her valsartan was stopped for a short time and her heart rate got too high and it was restarted.        Allergies   Allergen Reactions  Renal failure     Sleep apnea     SUPPOSED TO USE CPAP    Vertigo      Past Surgical History:   Procedure Laterality Date    BREAST CYST EXCISION Right 2017    end of march    BREAST SURGERY      breast reduction    BRONCHOSCOPY  12    BRONCHOSCOPY  2016    w/ washing     SECTION      CHOLECYSTECTOMY      HERNIA REPAIR      HYSTERECTOMY  2015    LAPAROSCOPIC APPENDECTOMY  6/3/11    LAPAROSCOPY  2011    operative, with lysis of adhesions    NEUROMA SURGERY      OTHER SURGICAL HISTORY      3 rt heart caths and 2 left heart caths    SALPINGO-OOPHORECTOMY  6/3/11    Laparoscopic Right    SHOULDER SURGERY      SHOULDER SURGERY Right 2018    UPPER GASTROINTESTINAL ENDOSCOPY  11    biopsy    UPPER GASTROINTESTINAL ENDOSCOPY  2011    EUS     Family History   Problem Relation Age of Onset    Cancer Father      lung    Hypertension Father     High Blood Pressure Father     Hypertension Brother     High Blood Pressure Brother     Hypertension Mother    [facility] Other Mother      abdominal aortic aneursym - passes 2009    Heart Disease Mother     High Blood Pressure Mother     Cancer Paternal Grandmother     Hypertension Other      aunt    Stroke Maternal Grandfather     Cancer Maternal Aunt      breast    High Blood Pressure Maternal Aunt     Cancer Paternal Aunt      lung    Cancer Paternal Uncle      brain    Heart Disease Maternal Grandmother     High Blood Pressure Maternal Grandmother     Heart Disease Paternal Grandfather     Diabetes Neg Hx     Osteoporosis Neg Hx     Thyroid Disease Neg Hx     Anesth Problems Neg Hx     Malig Hyperten Neg Hx     Hypotension Neg Hx     Malig Hypertherm Neg Hx     Pseudochol. Deficiency Neg Hx      Social History     Social History    Marital status:      Spouse name: N/A    Number of children: N/A    Years of education: N/A     Occupational History    Not on file. · Resp Auscultation: Normal breath sounds without dullness  Cardiovascular:  · The apical impulses not displaced  · Heart tones are crisp and normal  · Cervical veins are not engorged  · The carotid upstroke is normal in amplitude and contour without delay or bruit  · JVP less than 8 cm H2O  RRR with nl S1 and S2 without m,r,g  · Peripheral pulses are symmetrical and full  · There is no clubbing, cyanosis of the extremities. · No edema  · Femoral Arteries: 2+ and equal  · Pedal Pulses: 2+ and equal   Neck:  · JVP less than 8 cm H2O  · No thyromegaly  Abdomen:  · No masses or tenderness  · Liver/Spleen: No Abnormalities Noted  Neurological/Psychiatric:  · Alert and oriented in all spheres  · Moves all extremities well  · Exhibits normal gait balance and coordination  · No abnormalities of mood, affect, memory, mentation, or behavior are noted     Echo 8/26/2016:  Summary  Normal left ventricle size, wall thickness and systolic function with an EF of 55%. Reversal of E/A inflow velocities across the mitral valve suggesting impaired left ventricular relaxation.     Cardiac Cath 5/8/14:  LM-normal  LAD-normal  Cx- normal  RCA-normal, dominant   LVEF-55%     Hemodynamics: RA mean 4  RV 32/0  PA 32/11 mean 19  PAW 10/9 mean 5  Thermal: C.O. 4.06 and C.I. 2.05  Glenn: C.O. 4.96 and C.I. 2.51         Assessment:  1. Chronic diastolic CHF. NYHA class III. Does not appear grossly volume overloaded. 2. Dyspnea/chest discomfort. Breathing unchanged but stable. 3. Hypertension. Borderline low. Denies dizziness of LH.    4.  Hyperlipidemia. Intolerant of statins. On Zetia and fenofibrate.  on recent labs. Wexploring if she is candidate for PCSK9 inhibitors. 5. Hypokalemia--Will increase potassium 10 meq to twice daily. Repeat bmp in 2 months. Plan: Stable cardiac status. Same medications. Fasting labs bmp, bnp, cbcd, liopid before next visit in 6 months.          QUALITY MEASURES

## 2018-02-14 NOTE — COMMUNICATION BODY
been no change in energy level, sleep pattern, or activity level. · Eyes: No visual changes or diplopia. No scleral icterus. · ENT: No Headaches, hearing loss or vertigo. No mouth sores or sore throat. · Cardiovascular: Reviewed in HPI  · Respiratory: No cough or wheezing, no sputum production. No hematemesis. · Gastrointestinal: No abdominal pain, appetite loss, blood in stools. No change in bowel or bladder habits. · Genitourinary: No dysuria, trouble voiding, or hematuria. · Musculoskeletal:  No gait disturbance, weakness or joint complaints. · Integumentary: No rash or pruritis. · Neurological: No headache, diplopia, change in muscle strength, numbness or tingling. No change in gait, balance, coordination, mood, affect, memory, mentation, behavior. · Psychiatric: No anxiety, no depression. · Endocrine: No malaise, fatigue or temperature intolerance. No excessive thirst, fluid intake, or urination. No tremor. · Hematologic/Lymphatic: No abnormal bruising or bleeding, blood clots or swollen lymph nodes. · Allergic/Immunologic: No nasal congestion or hives. Physical Examination:      Wt Readings from Last 3 Encounters:   02/09/18 171 lb (77.6 kg)   12/19/17 170 lb (77.1 kg)   10/06/17 170 lb (77.1 kg)     BP Readings from Last 3 Encounters:   02/09/18 80/60   12/19/17 105/75   10/06/17 118/76     Constitutional and General Appearance:   WD/WN in NAD  HEENT:  NC/AT  Skin:  Warm, dry  Respiratory:  · Normal excursion and expansion without use of accessory muscles  · Resp Auscultation: Normal breath sounds without dullness  Cardiovascular:  · The apical impulses not displaced  · Heart tones are crisp and normal  · Cervical veins are not engorged  · The carotid upstroke is normal in amplitude and contour without delay or bruit  · JVP less than 8 cm H2O  RRR with nl S1 and S2 without m,r,g  · Peripheral pulses are symmetrical and full  · There is no clubbing, cyanosis of the extremities.   · No

## 2018-03-14 DIAGNOSIS — R06.02 SOB (SHORTNESS OF BREATH): ICD-10-CM

## 2018-03-14 DIAGNOSIS — E78.00 PURE HYPERCHOLESTEROLEMIA: ICD-10-CM

## 2018-03-14 DIAGNOSIS — I10 ESSENTIAL HYPERTENSION: ICD-10-CM

## 2018-03-14 RX ORDER — SPIRONOLACTONE 50 MG/1
50 TABLET, FILM COATED ORAL DAILY
Qty: 30 TABLET | Refills: 11 | Status: SHIPPED | OUTPATIENT
Start: 2018-03-14 | End: 2019-01-24 | Stop reason: SDUPTHER

## 2018-03-14 NOTE — TELEPHONE ENCOUNTER
LF 8/21/17 #30, 11RF    LOV 2/9/18  FU scheduled 8/24/18  Labs 2/2/18  Fasting labs due before next office visit

## 2018-03-14 NOTE — TELEPHONE ENCOUNTER
Medication Refill    When was your last appointment with cardiology? 2/9/18  (if 1year or longer, please schedule an appointment)    Medication needing refilled:spironolactone (ALDACTONE) 50 MG tablet    Doseage of the medication:50 mg    How are you taking this medication (QD, BID, TID, QID, PRN):Take 1 tablet by mouth daily - Oral    Patient want a 30 or 90 day supply called in:30    Which Pharmacy are we sending the medication to:[facility] [address] [place] [address] [phone]    Pharmacy Phone number:    Pharmacy Fax number:

## 2018-04-06 ENCOUNTER — TELEPHONE (OUTPATIENT)
Dept: CARDIOLOGY CLINIC | Age: 53
End: 2018-04-06

## 2018-05-16 RX ORDER — CARVEDILOL 6.25 MG/1
6.25 TABLET ORAL 2 TIMES DAILY WITH MEALS
Qty: 60 TABLET | Refills: 10 | Status: SHIPPED | OUTPATIENT
Start: 2018-05-16 | End: 2019-02-21 | Stop reason: SDUPTHER

## 2018-05-17 ENCOUNTER — HOSPITAL ENCOUNTER (OUTPATIENT)
Dept: MAMMOGRAPHY | Age: 53
Discharge: HOME | End: 2018-05-17
Attending: FAMILY MEDICINE | Admitting: FAMILY MEDICINE

## 2018-05-17 DIAGNOSIS — Z80.3 FAMILY HISTORY OF MALIGNANT NEOPLASM OF BREAST: ICD-10-CM

## 2018-05-17 DIAGNOSIS — Z12.31 ENCOUNTER FOR SCREENING MAMMOGRAM FOR BREAST CANCER: ICD-10-CM

## 2018-05-18 ENCOUNTER — TELEPHONE (OUTPATIENT)
Dept: CARDIOLOGY CLINIC | Age: 53
End: 2018-05-18

## 2018-06-07 ENCOUNTER — TELEPHONE (OUTPATIENT)
Dept: CARDIOLOGY CLINIC | Age: 53
End: 2018-06-07

## 2018-08-18 ENCOUNTER — HOSPITAL ENCOUNTER (OUTPATIENT)
Dept: OTHER | Age: 53
Discharge: HOME | End: 2018-08-18
Attending: INTERNAL MEDICINE | Admitting: INTERNAL MEDICINE

## 2018-08-18 DIAGNOSIS — I50.32 DIASTOLIC CHF, CHRONIC (HCC): Chronic | ICD-10-CM

## 2018-08-18 DIAGNOSIS — I10 ESSENTIAL HYPERTENSION: Chronic | ICD-10-CM

## 2018-08-18 DIAGNOSIS — E78.00 PURE HYPERCHOLESTEROLEMIA: Chronic | ICD-10-CM

## 2018-08-18 DIAGNOSIS — R06.02 SOB (SHORTNESS OF BREATH): Chronic | ICD-10-CM

## 2018-08-18 LAB
A/G RATIO: 1.5 (ref 1.1–2.2)
ALBUMIN SERPL-MCNC: 4.6 G/DL (ref 3.4–5)
ALP BLD-CCNC: 108 U/L (ref 40–129)
ALT SERPL-CCNC: 18 U/L (ref 10–40)
ANION GAP SERPL CALCULATED.3IONS-SCNC: 14 MMOL/L (ref 3–16)
AST SERPL-CCNC: 20 U/L (ref 15–37)
BILIRUB SERPL-MCNC: 0.3 MG/DL (ref 0–1)
BUN BLDV-MCNC: 27 MG/DL (ref 7–20)
CALCIUM SERPL-MCNC: 9.7 MG/DL (ref 8.3–10.6)
CHLORIDE BLD-SCNC: 101 MMOL/L (ref 99–110)
CHOLESTEROL, TOTAL: 177 MG/DL (ref 0–199)
CO2: 24 MMOL/L (ref 21–32)
CREAT SERPL-MCNC: 1 MG/DL (ref 0.6–1.1)
GFR AFRICAN AMERICAN: >60
GFR NON-AFRICAN AMERICAN: 58
GLOBULIN: 3 G/DL
GLUCOSE BLD-MCNC: 83 MG/DL (ref 70–99)
HCT VFR BLD CALC: 36.1 % (ref 36–48)
HDLC SERPL-MCNC: 35 MG/DL (ref 40–60)
HEMOGLOBIN: 12.3 G/DL (ref 12–16)
LDL CHOLESTEROL CALCULATED: 121 MG/DL
MCH RBC QN AUTO: 28.9 PG (ref 26–34)
MCHC RBC AUTO-ENTMCNC: 34 G/DL (ref 31–36)
MCV RBC AUTO: 85 FL (ref 80–100)
PDW BLD-RTO: 13.3 % (ref 12.4–15.4)
PLATELET # BLD: 321 K/UL (ref 135–450)
PMV BLD AUTO: 9.3 FL (ref 5–10.5)
POTASSIUM SERPL-SCNC: 3.8 MMOL/L (ref 3.5–5.1)
PRO-BNP: 16 PG/ML (ref 0–124)
RBC # BLD: 4.24 M/UL (ref 4–5.2)
SODIUM BLD-SCNC: 139 MMOL/L (ref 136–145)
TOTAL PROTEIN: 7.6 G/DL (ref 6.4–8.2)
TRIGL SERPL-MCNC: 104 MG/DL (ref 0–150)
VLDLC SERPL CALC-MCNC: 21 MG/DL
WBC # BLD: 5.1 K/UL (ref 4–11)

## 2018-08-24 ENCOUNTER — OFFICE VISIT (OUTPATIENT)
Dept: CARDIOLOGY CLINIC | Age: 53
End: 2018-08-24

## 2018-08-24 VITALS
HEIGHT: 64 IN | BODY MASS INDEX: 28.51 KG/M2 | HEART RATE: 88 BPM | WEIGHT: 167 LBS | SYSTOLIC BLOOD PRESSURE: 94 MMHG | DIASTOLIC BLOOD PRESSURE: 66 MMHG

## 2018-08-24 DIAGNOSIS — I50.32 DIASTOLIC CHF, CHRONIC (HCC): Primary | Chronic | ICD-10-CM

## 2018-08-24 DIAGNOSIS — R06.02 SOB (SHORTNESS OF BREATH): Chronic | ICD-10-CM

## 2018-08-24 DIAGNOSIS — E78.00 PURE HYPERCHOLESTEROLEMIA: Chronic | ICD-10-CM

## 2018-08-24 PROCEDURE — 99214 OFFICE O/P EST MOD 30 MIN: CPT | Performed by: INTERNAL MEDICINE

## 2018-08-24 RX ORDER — LOSARTAN POTASSIUM 50 MG/1
25 TABLET ORAL DAILY
Qty: 90 TABLET | Refills: 1 | Status: CANCELLED | OUTPATIENT
Start: 2018-08-24

## 2018-08-24 RX ORDER — LOSARTAN POTASSIUM 50 MG/1
25 TABLET ORAL DAILY
COMMUNITY
End: 2018-08-24 | Stop reason: ALTCHOICE

## 2018-08-24 RX ORDER — LOSARTAN POTASSIUM 25 MG/1
25 TABLET ORAL DAILY
Qty: 90 TABLET | Refills: 3 | Status: SHIPPED | OUTPATIENT
Start: 2018-08-24 | End: 2019-02-21 | Stop reason: SINTOL

## 2018-08-24 NOTE — PROGRESS NOTES
[facility]   Advanced Heart Failure/Pulmonary Hypertension  Cardiac Follow up       Reema Mendenhall  YOB: 1965    Date of Visit:  8/24/18    Chief Complaint   Patient presents with    Congestive Heart Failure     History of Present Illness:  Reema Mendenhall is a 47 y.o. female with past medical history significant for dCHF, PHTN, NIRALI, HTN and CKD. She was last seen in office 5/18 and Valsartan discontinued secondary symptomatic hypotension with it's use (97/52). She sees Dr. Fernandez Fang neurology for headaches. Today, she states that she is doing well cardiac wise. She has lost weight, about 95 pounds, she changed her diet, eats more low carb foods and cut out sweets to help prevent diabetes. Her valsartan was recalled abd wants to switch to a substitution. 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 Taqueria Woodall at [facility]. Since surgery she has had residual dizziness that's been stable. She had no cardiac problems postop. She currently denies chest pain, sob, edema or palpitations.        Allergies   Allergen Reactions    Lisinopril Other (See Comments)     cough    Statins Other (See Comments)     Light-headedness, cp, fast HR    Dilaudid [Hydromorphone Hcl] Nausea And Vomiting    Skelaxin [Metaxalone] Palpitations     Heart beats fast     Current Outpatient Prescriptions   Medication Sig Dispense Refill    aspirin 81 MG tablet Take 81 mg by mouth daily      losartan (COZAAR) 50 MG tablet Take 25 mg by mouth daily       losartan (COZAAR) 25 MG tablet Take 1 tablet by mouth daily 90 tablet 3    carvedilol (COREG) 6.25 MG tablet Take 1 tablet by mouth 2 times daily (with meals) 60 tablet 10    spironolactone (ALDACTONE) 50 MG tablet Take 1 tablet by mouth daily 30 tablet 11    topiramate (TOPAMAX) 50 MG tablet TAKE ONE TABLET BY MOUTH TWICE A DAY 60 tablet 5    furosemide (LASIX) 40 MG tablet TAKE ONE TABLET BY MOUTH DAILY (Patient taking differently: 20mg daily) 30 tablet 10    potassium chloride (KLOR-CON M) 10 MEQ extended release tablet Take 1 tablet twice daily. 180 tablet 3    ezetimibe (ZETIA) 10 MG tablet Take 1 tablet by mouth daily 30 tablet 11    fenofibrate (TRICOR) 145 MG tablet Take 1 tablet by mouth daily 90 tablet 3    nortriptyline (PAMELOR) 10 MG capsule Take 10 mg by mouth 2 times daily      Liraglutide (VICTOZA) 18 MG/3ML SOPN SC injection Inject 1.8 mg into the skin daily       Meperidine HCl (DEMEROL PO) Take by mouth nightly      metformin (GLUCOPHAGE) 1000 MG tablet Take 1 tablet by mouth 2 times daily (with meals). 60 tablet 6     No current facility-administered medications for this visit.         Past Medical History:   Diagnosis Date    Abdominal pain 2010    Arthritis     Asthma     Chest pain 2011    CHF (congestive heart failure) (HCC)     Diastolic heart failure     5/17/10-heart worsening-Dr. Veena Singh    Dyspnea     Heart failure, diastolic (HCC)     Hemoptysis 2012    Hyperlipidemia     Hypertension     Nausea & vomiting     PONV (postoperative nausea and vomiting)     Pulmonary hypertension ([address]     Pulmonary hypertension due to left ventricular diastolic dysfunction ([address]     Renal failure     Sleep apnea     SUPPOSED TO USE CPAP    Vertigo      Past Surgical History:   Procedure Laterality Date    BREAST CYST EXCISION Right 2017    end     BREAST SURGERY      breast reduction    BRONCHOSCOPY  12    BRONCHOSCOPY  2016    w/ washing     SECTION      CHOLECYSTECTOMY      HERNIA REPAIR      HYSTERECTOMY  2015    LAPAROSCOPIC APPENDECTOMY  6/3/11    LAPAROSCOPY  2011    operative, with lysis of adhesions    NEUROMA SURGERY      OTHER SURGICAL HISTORY      3 rt heart caths and 2 left heart caths    SALPINGO-OOPHORECTOMY  6/3/11    Laparoscopic Right    SHOULDER SURGERY      SHOULDER SURGERY Right 01/2018    UPPER GASTROINTESTINAL ENDOSCOPY  9/13/11    biopsy    UPPER GASTROINTESTINAL ENDOSCOPY  9/14/2011    EUS     Family History   Problem Relation Age of Onset    Cancer Father         lung    Hypertension Father     High Blood Pressure Father     Hypertension Brother     High Blood Pressure Brother     Hypertension Mother    [facility] Other Mother         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/2009    Heart Disease Mother     High Blood Pressure Mother     Cancer Paternal Grandmother     Hypertension Other         aunt    Stroke Maternal Grandfather     Cancer Maternal Aunt         breast    High Blood Pressure Maternal Aunt     Cancer Paternal Aunt         lung    Cancer Paternal Uncle         brain    Heart Disease Maternal Grandmother     High Blood Pressure Maternal Grandmother     Heart Disease Paternal Grandfather     Diabetes Neg Hx     Osteoporosis Neg Hx     Thyroid Disease Neg Hx     Anesth Problems Neg Hx     Malig Hyperten Neg Hx     Hypotension Neg Hx     Malig Hypertherm Neg Hx     Pseudochol. Deficiency Neg Hx      Social History     Social History    Marital status:      Spouse name: N/A    Number of children: N/A    Years of education: N/A     Occupational History    Not on file. Social History Main Topics    Smoking status: Never Smoker    Smokeless tobacco: Never Used    Alcohol use 0.0 oz/week      Comment: rarely    Drug use: No    Sexual activity: Yes     Partners: Male     Other Topics Concern    Not on file     Social History Narrative    No narrative on file       Review of Systems:   · Constitutional: there has been no unanticipated weight loss. There's been no change in energy level, sleep pattern, or activity level. · Eyes: No visual changes or diplopia. No scleral icterus. · ENT: No Headaches, hearing loss or vertigo.  No mouth sores H2O  · No thyromegaly  Abdomen:  · No masses or tenderness  · Liver/Spleen: No Abnormalities Noted  Neurological/Psychiatric:  · Alert and oriented in all spheres  · Moves all extremities well  · Exhibits normal gait balance and coordination  · No abnormalities of mood, affect, memory, mentation, or behavior are noted      Diagnostics:   Echo 8/25/2017   Summary   Normal left ventricle size, wall thickness and systolic function with an   estimated ejection fraction of 55-60%.  Trivial tricuspid regurgitation. Echo 8/26/2016:  Summary  Normal left ventricle size, wall thickness and systolic function with an EF of 55%. Reversal of E/A inflow velocities across the mitral valve suggesting impaired left ventricular relaxation.     Cardiac Cath 5/8/14:  LM-normal  LAD-normal  Cx- normal  RCA-normal, dominant   LVEF-55%     Hemodynamics: RA mean 4  RV 32/0  PA 32/11 mean 19  PAW 10/9 mean 5  Thermal: C.O. 4.06 and C.I. 2.05  Glenn: C.O. 4.96 and C.I. 2.51      Assessment:  1. Chronic diastolic CHF. NYHA class III--Compensated. .    2. Dyspnea/chest discomfort. Breathing unchanged but stable. 3. Hypertension. Borderline low. Denies dizziness of LH.    4.  Hyperlipidemia. Intolerant of statins. On Zetia and fenofibrate.  on recent labs. Well controlled with diet. .      Plan: Stable cardiac status. We switch valsartan to losartan 25 mg nightly. Fasting labs cmp, bnp, cbcd, lipid  RTO in 6 months. Scribe's attestation: This note was scribed in the presence of Josafat Saleh M.D. by Michela Peñaloza RN    The scribe's documentation has been prepared under my direction and personally reviewed by me in its entirety. I confirm that the note above accurately reflects all work, treatment, procedures, and medical decision making performed by me.       QUALITY MEASURES  1. Tobacco Cessation Counseling: NA  2. Retake of BP if >140/90:   NA  3.  Documentation to PCP/referring for new patient:  Sent to PCP at

## 2018-08-24 NOTE — LETTER
[address] [address] [place] [address] [address]  Phone: [phone]  Fax: [phone]    Jose Juarez MD        August 30, 2018     [place] [facility]  [address] [address]    Patient: Maryellen Belcher  MR Number: [id number]  YOB: 1965  Date of Visit: 8/24/2018        [address]   Advanced Heart Failure/Pulmonary Hypertension  Cardiac Follow up       Maryellen Belcher  YOB: 1965    Date of Visit:  8/24/18    Chief Complaint   Patient presents with    Congestive Heart Failure     History of Present Illness:  Maryellen Belcher is a 47 y.o. female with past medical history significant for dCHF, PHTN, NIRALI, HTN and CKD. She was last seen in office 5/18 and Valsartan discontinued secondary symptomatic hypotension with it's use (97/52). She sees Dr. Kris Bowles neurology for headaches. Today, she states that she is doing well cardiac wise. She has lost weight, about 95 pounds, she changed her diet, eats more low carb foods and cut out sweets to help prevent diabetes. Her valsartan was recalled abd wants to switch to a substitution. 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 Elder Lesches at [facility]. Since surgery she has had residual dizziness that's been stable. She had no cardiac problems postop. She currently denies chest pain, sob, edema or palpitations.        Allergies   Allergen Reactions    Lisinopril Other (See Comments)     cough    Statins Other (See Comments)     Light-headedness, cp, fast HR    Dilaudid [Hydromorphone Hcl] Nausea And Vomiting    Skelaxin [Metaxalone] Palpitations     Heart beats fast     Current Outpatient Prescriptions   Medication Sig Dispense Refill    aspirin 81 MG tablet Take 81 mg by mouth daily  losartan (COZAAR) 50 MG tablet Take 25 mg by mouth daily       losartan (COZAAR) 25 MG tablet Take 1 tablet by mouth daily 90 tablet 3    carvedilol (COREG) 6.25 MG tablet Take 1 tablet by mouth 2 times daily (with meals) 60 tablet 10    spironolactone (ALDACTONE) 50 MG tablet Take 1 tablet by mouth daily 30 tablet 11    topiramate (TOPAMAX) 50 MG tablet TAKE ONE TABLET BY MOUTH TWICE A DAY 60 tablet 5    furosemide (LASIX) 40 MG tablet TAKE ONE TABLET BY MOUTH DAILY (Patient taking differently: 20mg daily) 30 tablet 10    potassium chloride (KLOR-CON M) 10 MEQ extended release tablet Take 1 tablet twice daily. 180 tablet 3    ezetimibe (ZETIA) 10 MG tablet Take 1 tablet by mouth daily 30 tablet 11    fenofibrate (TRICOR) 145 MG tablet Take 1 tablet by mouth daily 90 tablet 3    nortriptyline (PAMELOR) 10 MG capsule Take 10 mg by mouth 2 times daily      Liraglutide (VICTOZA) 18 MG/3ML SOPN SC injection Inject 1.8 mg into the skin daily       Meperidine HCl (DEMEROL PO) Take by mouth nightly      metformin (GLUCOPHAGE) 1000 MG tablet Take 1 tablet by mouth 2 times daily (with meals). 60 tablet 6     No current facility-administered medications for this visit.         Past Medical History:   Diagnosis Date    Abdominal pain 9/9/2010    Arthritis     Asthma     Chest pain 6/22/2011    CHF (congestive heart failure) (HCC)     Diastolic heart failure     5/17/10-heart worsening-Dr. Misti Eric    Dyspnea     Heart failure, diastolic (HCC)     Hemoptysis 5/14/2012    Hyperlipidemia     Hypertension     Nausea & vomiting     PONV (postoperative nausea and vomiting)     Pulmonary hypertension (HCC)     Pulmonary hypertension due to left ventricular diastolic dysfunction (HCC)     Renal failure     Sleep apnea     SUPPOSED TO USE CPAP    Vertigo      Past Surgical History:   Procedure Laterality Date    BREAST CYST EXCISION Right 03/28/2017    end of march    BREAST SURGERY Partners: Male     Other Topics Concern    Not on file     Social History Narrative    No narrative on file       Review of Systems:   · Constitutional: there has been no unanticipated weight loss. There's been no change in energy level, sleep pattern, or activity level. · Eyes: No visual changes or diplopia. No scleral icterus. · ENT: No Headaches, hearing loss or vertigo. No mouth sores or sore throat. · Cardiovascular: Reviewed in HPI  · Respiratory: No cough or wheezing, no sputum production. No hematemesis. · Gastrointestinal: No abdominal pain, appetite loss, blood in stools. No change in bowel or bladder habits. · Genitourinary: No dysuria, trouble voiding, or hematuria. · Musculoskeletal:  No gait disturbance, weakness or joint complaints. · Integumentary: No rash or pruritis. · Neurological: No headache, diplopia, change in muscle strength, numbness or tingling. No change in gait, balance, coordination, mood, affect, memory, mentation, behavior. · Psychiatric: No anxiety, no depression. · Endocrine: No malaise, fatigue or temperature intolerance. No excessive thirst, fluid intake, or urination. No tremor. · Hematologic/Lymphatic: No abnormal bruising or bleeding, blood clots or swollen lymph nodes. · Allergic/Immunologic: No nasal congestion or hives. Physical Examination:    Vitals:    08/24/18 1314   BP: 94/66   Pulse: 88   Low but stable. Denies dizziness or LH.     Wt Readings from Last 3 Encounters:   08/24/18 167 lb (75.8 kg)   02/09/18 171 lb (77.6 kg)   12/19/17 170 lb (77.1 kg)     BP Readings from Last 3 Encounters:   08/24/18 94/66   02/09/18 80/60   12/19/17 105/75     Constitutional and General Appearance:   WD/WN in NAD  HEENT:  NC/AT  Skin:  Warm, dry  Respiratory:  · Normal excursion and expansion without use of accessory muscles  · Resp Auscultation: Normal breath sounds without dullness  Cardiovascular:  · The apical impulses not displaced · Heart tones are crisp and normal  · Cervical veins are not engorged  · The carotid upstroke is normal in amplitude and contour without delay or bruit  · JVP less than 8 cm H2O  RRR with nl S1 and S2 without m,r,g  · Peripheral pulses are symmetrical and full  · There is no clubbing, cyanosis of the extremities. · No edema  · Femoral Arteries: 2+ and equal  · Pedal Pulses: 2+ and equal   Neck:  · JVP less than 8 cm H2O  · No thyromegaly  Abdomen:  · No masses or tenderness  · Liver/Spleen: No Abnormalities Noted  Neurological/Psychiatric:  · Alert and oriented in all spheres  · Moves all extremities well  · Exhibits normal gait balance and coordination  · No abnormalities of mood, affect, memory, mentation, or behavior are noted      Diagnostics:   Echo 8/25/2017   Summary   Normal left ventricle size, wall thickness and systolic function with an   estimated ejection fraction of 55-60%.  Trivial tricuspid regurgitation. Echo 8/26/2016:  Summary  Normal left ventricle size, wall thickness and systolic function with an EF of 55%. Reversal of E/A inflow velocities across the mitral valve suggesting impaired left ventricular relaxation.     Cardiac Cath 5/8/14:  LM-normal  LAD-normal  Cx- normal  RCA-normal, dominant   LVEF-55%     Hemodynamics: RA mean 4  RV 32/0  PA 32/11 mean 19  PAW 10/9 mean 5  Thermal: C.O. 4.06 and C.I. 2.05  Glenn: C.O. 4.96 and C.I. 2.51      Assessment:  1. Chronic diastolic CHF. NYHA class III--Compensated. .    2. Dyspnea/chest discomfort. Breathing unchanged but stable. 3. Hypertension. Borderline low. Denies dizziness of LH.    4.  Hyperlipidemia. Intolerant of statins. On Zetia and fenofibrate.  on recent labs. Well controlled with diet. .      Plan: Stable cardiac status. We switch valsartan to losartan 25 mg nightly. Fasting labs cmp, bnp, cbcd, lipid  RTO in 6 months. Scribe's attestation:   This note was scribed in the presence of Alicia ARTIS

## 2018-08-30 NOTE — COMMUNICATION BODY
[facility]   Advanced Heart Failure/Pulmonary Hypertension  Cardiac Follow up       Reema Mendenhall  YOB: 1965    Date of Visit:  8/24/18    Chief Complaint   Patient presents with    Congestive Heart Failure     History of Present Illness:  Reema Mendenhall is a 47 y.o. female with past medical history significant for dCHF, PHTN, NIRALI, HTN and CKD. She was last seen in office 5/18 and Valsartan discontinued secondary symptomatic hypotension with it's use (97/52). She sees Dr. Fernandez Fang neurology for headaches. Today, she states that she is doing well cardiac wise. She has lost weight, about 95 pounds, she changed her diet, eats more low carb foods and cut out sweets to help prevent diabetes. Her valsartan was recalled abd wants to switch to a substitution. 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 Taqueria Woodall at [address] [place] [address]. Since surgery she has had residual dizziness that's been stable. She had no cardiac problems postop. She currently denies chest pain, sob, edema or palpitations.        Allergies   Allergen Reactions    Lisinopril Other (See Comments)     cough    Statins Other (See Comments)     Light-headedness, cp, fast HR    Dilaudid [Hydromorphone Hcl] Nausea And Vomiting    Skelaxin [Metaxalone] Palpitations     Heart beats fast     Current Outpatient Prescriptions   Medication Sig Dispense Refill    aspirin 81 MG tablet Take 81 mg by mouth daily      losartan (COZAAR) 50 MG tablet Take 25 mg by mouth daily       losartan (COZAAR) 25 MG tablet Take 1 tablet by mouth daily 90 tablet 3    carvedilol (COREG) 6.25 MG tablet Take 1 tablet by mouth 2 times daily (with meals) 60 tablet 10    spironolactone (ALDACTONE) 50 MG tablet Take 1 tablet by mouth daily 30 tablet 11    topiramate (TOPAMAX) 50 MG tablet TAKE ONE TABLET BY MOUTH close of office visit  4. CAD patient on anti-platelet: NA  5. CAD patient on STATIN therapy:  Yes  6. Patient with CHF and aFib on anticoagulation:  NA    I appreciate the opportunity of cooperating in the care of this patient.     Ronald Antoine M.D., [facility] [place]

## 2018-09-05 RX ORDER — FUROSEMIDE 40 MG/1
TABLET ORAL
Qty: 30 TABLET | Refills: 9 | Status: SHIPPED | OUTPATIENT
Start: 2018-09-05 | End: 2019-01-24 | Stop reason: ALTCHOICE

## 2018-09-07 DIAGNOSIS — R06.02 SOB (SHORTNESS OF BREATH): ICD-10-CM

## 2018-09-07 DIAGNOSIS — I50.32 DIASTOLIC CHF, CHRONIC (HCC): ICD-10-CM

## 2018-09-07 DIAGNOSIS — I10 ESSENTIAL HYPERTENSION: ICD-10-CM

## 2018-09-07 DIAGNOSIS — E78.00 PURE HYPERCHOLESTEROLEMIA: ICD-10-CM

## 2018-09-07 RX ORDER — EZETIMIBE 10 MG/1
TABLET ORAL
Qty: 30 TABLET | Refills: 11 | Status: SHIPPED | OUTPATIENT
Start: 2018-09-07 | End: 2019-09-14 | Stop reason: SDUPTHER

## 2018-10-21 DIAGNOSIS — I10 ESSENTIAL HYPERTENSION: ICD-10-CM

## 2018-10-21 DIAGNOSIS — E78.00 PURE HYPERCHOLESTEROLEMIA: ICD-10-CM

## 2018-10-21 DIAGNOSIS — R06.02 SHORTNESS OF BREATH: ICD-10-CM

## 2018-10-21 DIAGNOSIS — R06.02 SOB (SHORTNESS OF BREATH): ICD-10-CM

## 2018-10-21 DIAGNOSIS — I27.20 PULMONARY HYPERTENSION (HCC): ICD-10-CM

## 2018-10-21 DIAGNOSIS — E78.2 MIXED HYPERLIPIDEMIA: ICD-10-CM

## 2018-10-21 DIAGNOSIS — I50.32 DIASTOLIC CHF, CHRONIC (HCC): ICD-10-CM

## 2018-10-22 RX ORDER — POTASSIUM CHLORIDE 750 MG/1
TABLET, EXTENDED RELEASE ORAL
Qty: 180 TABLET | Refills: 2 | Status: SHIPPED | OUTPATIENT
Start: 2018-10-22 | End: 2019-01-24 | Stop reason: ALTCHOICE

## 2018-10-23 RX ORDER — TOPIRAMATE 50 MG/1
TABLET, FILM COATED ORAL
Qty: 60 TABLET | Refills: 0 | Status: SHIPPED | OUTPATIENT
Start: 2018-10-23 | End: 2018-11-27 | Stop reason: SDUPTHER

## 2018-11-27 ENCOUNTER — OFFICE VISIT (OUTPATIENT)
Dept: NEUROLOGY | Age: 53
End: 2018-11-27
Payer: COMMERCIAL

## 2018-11-27 VITALS
HEART RATE: 74 BPM | SYSTOLIC BLOOD PRESSURE: 110 MMHG | DIASTOLIC BLOOD PRESSURE: 77 MMHG | HEIGHT: 65 IN | BODY MASS INDEX: 27.82 KG/M2 | WEIGHT: 167 LBS

## 2018-11-27 DIAGNOSIS — G43.019 INTRACTABLE MIGRAINE WITHOUT AURA AND WITHOUT STATUS MIGRAINOSUS: Primary | ICD-10-CM

## 2018-11-27 PROCEDURE — 99213 OFFICE O/P EST LOW 20 MIN: CPT | Performed by: PSYCHIATRY & NEUROLOGY

## 2018-11-27 RX ORDER — TOPIRAMATE 50 MG/1
TABLET, FILM COATED ORAL
Qty: 90 TABLET | Refills: 2 | Status: SHIPPED | OUTPATIENT
Start: 2018-11-27 | End: 2019-02-22 | Stop reason: SDUPTHER

## 2018-11-27 NOTE — PROGRESS NOTES
08/18/2018    MCHC 34.0 08/18/2018    RDW 13.3 08/18/2018     08/18/2018    MPV 9.3 08/18/2018     BMP:    Lab Results   Component Value Date     08/18/2018    K 3.8 08/18/2018     08/18/2018    CO2 24 08/18/2018    BUN 27 08/18/2018    LABALBU 4.6 08/18/2018    CREATININE 1.0 08/18/2018    CALCIUM 9.7 08/18/2018    GFRAA >60 08/18/2018    GFRAA >60 04/02/2013    LABGLOM 58 08/18/2018    GLUCOSE 83 08/18/2018    GLUCOSE 192 08/19/2015     RADIOLOGY REVIEW:  MRI brain    Impression :  Migraine headaches Slightly worse recently  Recent diagnosis of schwannoma and status post surgery  Diabetic neuropathy      Plan :  Discussed with patient  Continue to avoid chocolate and nitrite-containing foods  I will increase her topiramate to 50 mg, one tablet in the morning and 2 tablets at night. Side effects were discussed. Strict control of diabetes was discussed  I will see her back in 3 months for follow-up. Please note a portion of  this chart was generated using dragon dictation software. Although every effort was made to ensure the accuracy of this automated transcription, some errors in transcription may have occurred.

## 2019-01-17 ENCOUNTER — TELEPHONE (OUTPATIENT)
Dept: CARDIOLOGY CLINIC | Age: 54
End: 2019-01-17

## 2019-01-21 ENCOUNTER — TELEPHONE (OUTPATIENT)
Dept: CARDIOLOGY CLINIC | Age: 54
End: 2019-01-21

## 2019-01-21 DIAGNOSIS — I50.32 DIASTOLIC CHF, CHRONIC (HCC): Chronic | ICD-10-CM

## 2019-01-21 DIAGNOSIS — R06.02 SOB (SHORTNESS OF BREATH): Chronic | ICD-10-CM

## 2019-01-21 DIAGNOSIS — I10 ESSENTIAL HYPERTENSION: ICD-10-CM

## 2019-01-21 DIAGNOSIS — E78.00 PURE HYPERCHOLESTEROLEMIA: Chronic | ICD-10-CM

## 2019-01-24 RX ORDER — SPIRONOLACTONE 25 MG/1
TABLET ORAL
Qty: 30 TABLET | Refills: 0
Start: 2019-01-24 | End: 2019-03-22 | Stop reason: SDUPTHER

## 2019-01-28 ENCOUNTER — HOSPITAL ENCOUNTER (OUTPATIENT)
Age: 54
Discharge: HOME | End: 2019-01-28
Payer: COMMERCIAL

## 2019-01-28 DIAGNOSIS — I50.32 DIASTOLIC CHF, CHRONIC (HCC): Chronic | ICD-10-CM

## 2019-01-28 DIAGNOSIS — I10 ESSENTIAL HYPERTENSION: ICD-10-CM

## 2019-01-28 DIAGNOSIS — R06.02 SOB (SHORTNESS OF BREATH): Chronic | ICD-10-CM

## 2019-01-28 LAB
ANION GAP SERPL CALCULATED.3IONS-SCNC: 12 MMOL/L (ref 3–16)
BUN BLDV-MCNC: 22 MG/DL (ref 7–20)
CALCIUM SERPL-MCNC: 9.4 MG/DL (ref 8.3–10.6)
CHLORIDE BLD-SCNC: 108 MMOL/L (ref 99–110)
CO2: 24 MMOL/L (ref 21–32)
CREAT SERPL-MCNC: 1.2 MG/DL (ref 0.6–1.1)
GFR AFRICAN AMERICAN: 57
GFR NON-AFRICAN AMERICAN: 47
GLUCOSE BLD-MCNC: 114 MG/DL (ref 70–99)
POTASSIUM SERPL-SCNC: 3.7 MMOL/L (ref 3.5–5.1)
PRO-BNP: 74 PG/ML (ref 0–124)
SODIUM BLD-SCNC: 144 MMOL/L (ref 136–145)

## 2019-01-28 PROCEDURE — 36415 COLL VENOUS BLD VENIPUNCTURE: CPT

## 2019-01-28 PROCEDURE — 80048 BASIC METABOLIC PNL TOTAL CA: CPT

## 2019-01-28 PROCEDURE — 83880 ASSAY OF NATRIURETIC PEPTIDE: CPT

## 2019-02-11 LAB
AVERAGE GLUCOSE: NORMAL
HBA1C MFR BLD: 5.5 %

## 2019-02-21 ENCOUNTER — OFFICE VISIT (OUTPATIENT)
Dept: CARDIOLOGY CLINIC | Age: 54
End: 2019-02-21
Payer: COMMERCIAL

## 2019-02-21 VITALS
WEIGHT: 163 LBS | HEIGHT: 65 IN | HEART RATE: 76 BPM | DIASTOLIC BLOOD PRESSURE: 62 MMHG | BODY MASS INDEX: 27.16 KG/M2 | SYSTOLIC BLOOD PRESSURE: 84 MMHG

## 2019-02-21 DIAGNOSIS — E78.00 PURE HYPERCHOLESTEROLEMIA: Chronic | ICD-10-CM

## 2019-02-21 DIAGNOSIS — I10 ESSENTIAL HYPERTENSION: Chronic | ICD-10-CM

## 2019-02-21 DIAGNOSIS — R06.02 SOB (SHORTNESS OF BREATH): Chronic | ICD-10-CM

## 2019-02-21 DIAGNOSIS — I50.32 DIASTOLIC CHF, CHRONIC (HCC): Primary | Chronic | ICD-10-CM

## 2019-02-21 PROCEDURE — 99214 OFFICE O/P EST MOD 30 MIN: CPT | Performed by: INTERNAL MEDICINE

## 2019-02-21 RX ORDER — CARVEDILOL 6.25 MG/1
3.12 TABLET ORAL 2 TIMES DAILY WITH MEALS
Qty: 60 TABLET | Refills: 10
Start: 2019-02-21 | End: 2020-12-15

## 2019-02-21 RX ORDER — FUROSEMIDE 40 MG/1
40 TABLET ORAL DAILY
Qty: 30 TABLET | Refills: 3
Start: 2019-02-21 | End: 2019-02-22 | Stop reason: SDUPTHER

## 2019-02-22 RX ORDER — FUROSEMIDE 40 MG/1
40 TABLET ORAL DAILY PRN
Qty: 30 TABLET | Refills: 3
Start: 2019-02-22 | End: 2019-03-22

## 2019-02-25 RX ORDER — TOPIRAMATE 50 MG/1
TABLET, FILM COATED ORAL
Qty: 90 TABLET | Refills: 1 | Status: SHIPPED | OUTPATIENT
Start: 2019-02-25 | End: 2019-04-28 | Stop reason: SDUPTHER

## 2019-03-07 ENCOUNTER — HOSPITAL ENCOUNTER (OUTPATIENT)
Age: 54
Discharge: HOME | End: 2019-03-07
Payer: COMMERCIAL

## 2019-03-07 ENCOUNTER — HOSPITAL ENCOUNTER (OUTPATIENT)
Dept: NON INVASIVE DIAGNOSTICS | Age: 54
Discharge: HOME | End: 2019-03-07
Payer: COMMERCIAL

## 2019-03-07 DIAGNOSIS — I50.32 DIASTOLIC CHF, CHRONIC (HCC): Chronic | ICD-10-CM

## 2019-03-07 DIAGNOSIS — R06.02 SOB (SHORTNESS OF BREATH): Chronic | ICD-10-CM

## 2019-03-07 DIAGNOSIS — E78.00 PURE HYPERCHOLESTEROLEMIA: Chronic | ICD-10-CM

## 2019-03-07 DIAGNOSIS — I10 ESSENTIAL HYPERTENSION: Chronic | ICD-10-CM

## 2019-03-07 LAB
ANION GAP SERPL CALCULATED.3IONS-SCNC: 14 MMOL/L (ref 3–16)
BUN BLDV-MCNC: 21 MG/DL (ref 7–20)
CALCIUM SERPL-MCNC: 10 MG/DL (ref 8.3–10.6)
CHLORIDE BLD-SCNC: 102 MMOL/L (ref 99–110)
CHOLESTEROL, FASTING: 211 MG/DL (ref 0–199)
CO2: 24 MMOL/L (ref 21–32)
CREAT SERPL-MCNC: 1.2 MG/DL (ref 0.6–1.1)
GFR AFRICAN AMERICAN: 57
GFR NON-AFRICAN AMERICAN: 47
GLUCOSE BLD-MCNC: 93 MG/DL (ref 70–99)
HDLC SERPL-MCNC: 43 MG/DL (ref 40–60)
LDL CHOLESTEROL CALCULATED: 133 MG/DL
LEFT VENTRICULAR EJECTION FRACTION HIGH VALUE: 60 %
LEFT VENTRICULAR EJECTION FRACTION MODE: NORMAL
LV EF: 55 %
LV EF: 58 %
LVEF MODALITY: NORMAL
POTASSIUM SERPL-SCNC: 4.3 MMOL/L (ref 3.5–5.1)
PRO-BNP: 22 PG/ML (ref 0–124)
SODIUM BLD-SCNC: 140 MMOL/L (ref 136–145)
TRIGLYCERIDE, FASTING: 173 MG/DL (ref 0–150)
VLDLC SERPL CALC-MCNC: 35 MG/DL

## 2019-03-07 PROCEDURE — 80048 BASIC METABOLIC PNL TOTAL CA: CPT

## 2019-03-07 PROCEDURE — 36415 COLL VENOUS BLD VENIPUNCTURE: CPT

## 2019-03-07 PROCEDURE — 93306 TTE W/DOPPLER COMPLETE: CPT

## 2019-03-07 PROCEDURE — 83880 ASSAY OF NATRIURETIC PEPTIDE: CPT

## 2019-03-07 PROCEDURE — 80061 LIPID PANEL: CPT

## 2019-03-17 DIAGNOSIS — I10 ESSENTIAL HYPERTENSION: ICD-10-CM

## 2019-03-17 DIAGNOSIS — E78.00 PURE HYPERCHOLESTEROLEMIA: ICD-10-CM

## 2019-03-17 DIAGNOSIS — R06.02 SOB (SHORTNESS OF BREATH): ICD-10-CM

## 2019-03-18 RX ORDER — SPIRONOLACTONE 50 MG/1
TABLET, FILM COATED ORAL
Qty: 90 TABLET | Refills: 3 | Status: SHIPPED | OUTPATIENT
Start: 2019-03-18 | End: 2019-03-22 | Stop reason: DRUGHIGH

## 2019-03-22 ENCOUNTER — OFFICE VISIT (OUTPATIENT)
Dept: CARDIOLOGY CLINIC | Age: 54
End: 2019-03-22
Payer: COMMERCIAL

## 2019-03-22 ENCOUNTER — TELEPHONE (OUTPATIENT)
Dept: CARDIOLOGY CLINIC | Age: 54
End: 2019-03-22

## 2019-03-22 VITALS
OXYGEN SATURATION: 99 % | SYSTOLIC BLOOD PRESSURE: 82 MMHG | HEART RATE: 79 BPM | BODY MASS INDEX: 27.32 KG/M2 | HEIGHT: 65 IN | WEIGHT: 164 LBS | DIASTOLIC BLOOD PRESSURE: 56 MMHG

## 2019-03-22 DIAGNOSIS — E78.00 PURE HYPERCHOLESTEROLEMIA: Chronic | ICD-10-CM

## 2019-03-22 DIAGNOSIS — R06.02 SOB (SHORTNESS OF BREATH): Chronic | ICD-10-CM

## 2019-03-22 DIAGNOSIS — I10 ESSENTIAL HYPERTENSION: ICD-10-CM

## 2019-03-22 DIAGNOSIS — I95.1 ORTHOSTATIC HYPOTENSION: Primary | ICD-10-CM

## 2019-03-22 PROCEDURE — 99214 OFFICE O/P EST MOD 30 MIN: CPT | Performed by: INTERNAL MEDICINE

## 2019-03-22 RX ORDER — MIDODRINE HYDROCHLORIDE 2.5 MG/1
2.5 TABLET ORAL 3 TIMES DAILY
Qty: 90 TABLET | Refills: 3 | Status: SHIPPED | OUTPATIENT
Start: 2019-03-22 | End: 2020-05-11 | Stop reason: SDUPTHER

## 2019-03-22 RX ORDER — SPIRONOLACTONE 25 MG/1
25 TABLET ORAL DAILY
Qty: 30 TABLET | Refills: 3 | Status: SHIPPED | OUTPATIENT
Start: 2019-03-22 | End: 2019-10-04 | Stop reason: SDUPTHER

## 2019-03-22 RX ORDER — PRAVASTATIN SODIUM 10 MG
10 TABLET ORAL DAILY
Qty: 30 TABLET | Refills: 3 | Status: SHIPPED | OUTPATIENT
Start: 2019-03-22 | End: 2019-07-30 | Stop reason: SDUPTHER

## 2019-03-22 RX ORDER — FUROSEMIDE 20 MG/1
10 TABLET ORAL DAILY PRN
Qty: 30 TABLET | Refills: 3 | Status: SHIPPED | OUTPATIENT
Start: 2019-03-22 | End: 2019-10-04

## 2019-04-22 RX ORDER — CARVEDILOL 6.25 MG/1
TABLET ORAL
Qty: 60 TABLET | Refills: 9 | Status: SHIPPED | OUTPATIENT
Start: 2019-04-22 | End: 2019-05-14

## 2019-04-22 NOTE — TELEPHONE ENCOUNTER
Lov 3/22/2019 RTO in 2 months   Labs 03/7/2019  Lrf 02/21/2019 Disp 60+10  Appt  5/14/2019    Please advise thanks.

## 2019-04-29 RX ORDER — TOPIRAMATE 50 MG/1
TABLET, FILM COATED ORAL
Qty: 90 TABLET | Refills: 0 | Status: SHIPPED | OUTPATIENT
Start: 2019-04-29 | End: 2019-06-11 | Stop reason: SDUPTHER

## 2019-05-04 ENCOUNTER — HOSPITAL ENCOUNTER (OUTPATIENT)
Age: 54
Discharge: HOME | End: 2019-05-04
Payer: COMMERCIAL

## 2019-05-04 DIAGNOSIS — E78.00 PURE HYPERCHOLESTEROLEMIA: Chronic | ICD-10-CM

## 2019-05-04 DIAGNOSIS — R06.02 SOB (SHORTNESS OF BREATH): Chronic | ICD-10-CM

## 2019-05-04 DIAGNOSIS — I50.32 DIASTOLIC CHF, CHRONIC (HCC): Chronic | ICD-10-CM

## 2019-05-04 LAB
A/G RATIO: 1.5 (ref 1.1–2.2)
ALBUMIN SERPL-MCNC: 4.6 G/DL (ref 3.4–5)
ALP BLD-CCNC: 114 U/L (ref 40–129)
ALT SERPL-CCNC: 13 U/L (ref 10–40)
ANION GAP SERPL CALCULATED.3IONS-SCNC: 13 MMOL/L (ref 3–16)
AST SERPL-CCNC: 17 U/L (ref 15–37)
BASOPHILS ABSOLUTE: 0 K/UL (ref 0–0.2)
BASOPHILS RELATIVE PERCENT: 0.8 %
BILIRUB SERPL-MCNC: 0.3 MG/DL (ref 0–1)
BUN BLDV-MCNC: 26 MG/DL (ref 7–20)
CALCIUM SERPL-MCNC: 10.1 MG/DL (ref 8.3–10.6)
CHLORIDE BLD-SCNC: 104 MMOL/L (ref 99–110)
CHOLESTEROL, TOTAL: 154 MG/DL (ref 0–199)
CO2: 26 MMOL/L (ref 21–32)
CREAT SERPL-MCNC: 1.3 MG/DL (ref 0.6–1.1)
EOSINOPHILS ABSOLUTE: 0.2 K/UL (ref 0–0.6)
EOSINOPHILS RELATIVE PERCENT: 3.3 %
GFR AFRICAN AMERICAN: 52
GFR NON-AFRICAN AMERICAN: 43
GLOBULIN: 3.1 G/DL
GLUCOSE BLD-MCNC: 101 MG/DL (ref 70–99)
HCT VFR BLD CALC: 37.1 % (ref 36–48)
HDLC SERPL-MCNC: 45 MG/DL (ref 40–60)
HEMOGLOBIN: 12.4 G/DL (ref 12–16)
LDL CHOLESTEROL CALCULATED: 95 MG/DL
LYMPHOCYTES ABSOLUTE: 1.3 K/UL (ref 1–5.1)
LYMPHOCYTES RELATIVE PERCENT: 28.3 %
MCH RBC QN AUTO: 28.6 PG (ref 26–34)
MCHC RBC AUTO-ENTMCNC: 33.5 G/DL (ref 31–36)
MCV RBC AUTO: 85.4 FL (ref 80–100)
MONOCYTES ABSOLUTE: 0.4 K/UL (ref 0–1.3)
MONOCYTES RELATIVE PERCENT: 8.7 %
NEUTROPHILS ABSOLUTE: 2.7 K/UL (ref 1.7–7.7)
NEUTROPHILS RELATIVE PERCENT: 58.9 %
PDW BLD-RTO: 12.8 % (ref 12.4–15.4)
PLATELET # BLD: 293 K/UL (ref 135–450)
PMV BLD AUTO: 9.5 FL (ref 5–10.5)
POTASSIUM SERPL-SCNC: 4.2 MMOL/L (ref 3.5–5.1)
PRO-BNP: 28 PG/ML (ref 0–124)
RBC # BLD: 4.34 M/UL (ref 4–5.2)
SODIUM BLD-SCNC: 143 MMOL/L (ref 136–145)
TOTAL PROTEIN: 7.7 G/DL (ref 6.4–8.2)
TRIGL SERPL-MCNC: 70 MG/DL (ref 0–150)
VLDLC SERPL CALC-MCNC: 14 MG/DL
WBC # BLD: 4.6 K/UL (ref 4–11)

## 2019-05-04 PROCEDURE — 80061 LIPID PANEL: CPT

## 2019-05-04 PROCEDURE — 85025 COMPLETE CBC W/AUTO DIFF WBC: CPT

## 2019-05-04 PROCEDURE — 83880 ASSAY OF NATRIURETIC PEPTIDE: CPT

## 2019-05-04 PROCEDURE — 36415 COLL VENOUS BLD VENIPUNCTURE: CPT

## 2019-05-04 PROCEDURE — 80053 COMPREHEN METABOLIC PANEL: CPT

## 2019-05-06 ENCOUNTER — TELEPHONE (OUTPATIENT)
Dept: CARDIOLOGY CLINIC | Age: 54
End: 2019-05-06

## 2019-05-06 NOTE — TELEPHONE ENCOUNTER
Dr Amy Hong wants to put her on verapamil 120mg BID . She was taking topamax but it isnt helping anymore since her brain surgery . Does MADELINE think it is ok for her to take the verapamil ?

## 2019-05-14 ENCOUNTER — OFFICE VISIT (OUTPATIENT)
Dept: CARDIOLOGY CLINIC | Age: 54
End: 2019-05-14
Payer: COMMERCIAL

## 2019-05-14 VITALS
OXYGEN SATURATION: 99 % | WEIGHT: 164 LBS | HEART RATE: 80 BPM | DIASTOLIC BLOOD PRESSURE: 56 MMHG | SYSTOLIC BLOOD PRESSURE: 112 MMHG | BODY MASS INDEX: 27.32 KG/M2 | HEIGHT: 65 IN

## 2019-05-14 DIAGNOSIS — I95.1 ORTHOSTATIC HYPOTENSION: ICD-10-CM

## 2019-05-14 DIAGNOSIS — I50.32 DIASTOLIC CHF, CHRONIC (HCC): Primary | Chronic | ICD-10-CM

## 2019-05-14 DIAGNOSIS — R06.02 SOB (SHORTNESS OF BREATH): Chronic | ICD-10-CM

## 2019-05-14 DIAGNOSIS — I10 ESSENTIAL HYPERTENSION: Chronic | ICD-10-CM

## 2019-05-14 DIAGNOSIS — E78.00 PURE HYPERCHOLESTEROLEMIA: Chronic | ICD-10-CM

## 2019-05-14 PROCEDURE — 99214 OFFICE O/P EST MOD 30 MIN: CPT | Performed by: INTERNAL MEDICINE

## 2019-05-14 NOTE — PROGRESS NOTES
[facility], [place]   Advanced Heart Failure/Pulmonary Hypertension  Cardiac Follow up       Jo Ann Schuster  YOB: 1965    Date of Visit:  5/14/19    Chief Complaint   Patient presents with    Congestive Heart Failure     History of Present Illness:  Jo Ann Schuster is a 47 y.o. female with past medical history significant for dCHF, PHTN, NIRALI, HTN, and CKD. Valsartan was discontinued in May 2018 secondary symptomatic hypotension with it's use (97/52). She sees Dr. Junie Mendenhall neurology for headaches. 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 Macario White at [facility]. She has been seeing Dr. Junie Mendenhall for headches and is taking Topiramate 50mg  one in the AM and two tabs at HS. Today, she presents for routine follow up. She has dyspnea when walking and climbing steps that is unchanged. She has intermittent light headedness/dizziness which occurs when BP is running 80/50's. She still gets headaches (history of congenital migraines -- may need brain surgery to insert fat). She denies exertional chest pain, CHUNG/PND, palpitations, light-headedness, edema. She is tolerating pravastatin. Her neurologist wanted to give her verapamil for headaches, but her blood pressure runs low and she is on midodrine. She would not tolerate that.     Allergies   Allergen Reactions    Lisinopril Other (See Comments)     cough    Statins Other (See Comments)     Light-headedness, cp, fast HR    Dilaudid [Hydromorphone Hcl] Nausea And Vomiting    Skelaxin [Metaxalone] Palpitations     Heart beats fast     Current Outpatient Medications   Medication Sig Dispense Refill    topiramate (TOPAMAX) 50 MG tablet TAKE 1 TABLET BY MOUTH EVERY MORNING AND TAKE 2 TABLETS BY MOUTH ONCE NIGHTLY 90 tablet 0    spironolactone (ALDACTONE) 25 MG tablet Take 1 tablet by mouth daily Decreased by Nephology 2018 to 25mg daily. 30 tablet 3    midodrine (PROAMATINE) 2.5 MG tablet Take 1 tablet by mouth 3 times daily (Patient taking differently: Take 2.5 mg by mouth three times a week ) 90 tablet 3    pravastatin (PRAVACHOL) 10 MG tablet Take 1 tablet by mouth daily 30 tablet 3    furosemide (LASIX) 20 MG tablet Take 0.5 tablets by mouth daily as needed (Take for SOB no more than twice a week) 30 tablet 3    carvedilol (COREG) 6.25 MG tablet Take 0.5 tablets by mouth 2 times daily (with meals) 60 tablet 10    ezetimibe (ZETIA) 10 MG tablet TAKE ONE TABLET BY MOUTH DAILY 30 tablet 11    fenofibrate (TRICOR) 145 MG tablet Take 1 tablet by mouth daily 90 tablet 3    nortriptyline (PAMELOR) 10 MG capsule Take 10 mg by mouth 2 times daily      Liraglutide (VICTOZA) 18 MG/3ML SOPN SC injection Inject 1.8 mg into the skin daily        No current facility-administered medications for this visit.         Past Medical History:   Diagnosis Date    Abdominal pain 2010    Arthritis     Asthma     Chest pain 2011    CHF (congestive heart failure) (HCC)     Diastolic heart failure     5/17/10-heart worsening-Dr. Leroy Cao    Dyspnea     Heart failure, diastolic (HCC)     Hemoptysis 2012    Hyperlipidemia     Hypertension     Nausea & vomiting     PONV (postoperative nausea and vomiting)     Pulmonary hypertension ([address]     Pulmonary hypertension due to left ventricular diastolic dysfunction ([address]     Renal failure     Sleep apnea     SUPPOSED TO USE CPAP    Vertigo      Past Surgical History:   Procedure Laterality Date    BREAST CYST EXCISION Right 2017    end     BREAST SURGERY      breast reduction    BRONCHOSCOPY  12    BRONCHOSCOPY  2016    w/ washing     SECTION      CHOLECYSTECTOMY      HERNIA REPAIR      HYSTERECTOMY  2015    LAPAROSCOPIC APPENDECTOMY  6/3/11    LAPAROSCOPY  2011    operative, with lysis of adhesions    NEUROMA SURGERY      OTHER SURGICAL HISTORY  2009/2010    3 rt heart caths and 2 left heart caths    SALPINGO-OOPHORECTOMY  6/3/11    Laparoscopic Right    SHOULDER SURGERY      SHOULDER SURGERY Right 01/2018    UPPER GASTROINTESTINAL ENDOSCOPY  9/13/11    biopsy    UPPER GASTROINTESTINAL ENDOSCOPY  9/14/2011    EUS     Family History   Problem Relation Age of Onset    Cancer Father         lung    Hypertension Father     High Blood Pressure Father     Hypertension Brother     High Blood Pressure Brother     Hypertension Mother    [facility] Other Mother         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/2009    Heart Disease Mother     High Blood Pressure Mother     Cancer Paternal Grandmother     Hypertension Other         aunt    Stroke Maternal Grandfather     Cancer Maternal Aunt         breast    High Blood Pressure Maternal Aunt     Cancer Paternal Aunt         lung    Cancer Paternal Uncle         brain    Heart Disease Maternal Grandmother     High Blood Pressure Maternal Grandmother     Heart Disease Paternal Grandfather     Diabetes Neg Hx     Osteoporosis Neg Hx     Thyroid Disease Neg Hx     Anesth Problems Neg Hx     Malig Hyperten Neg Hx     Hypotension Neg Hx     Malig Hypertherm Neg Hx     Pseudochol. Deficiency Neg Hx      Social History     Socioeconomic History    Marital status:      Spouse name: Not on file    Number of children: Not on file    Years of education: Not on file    Highest education level: Not on file   Occupational History    Not on file   Social Needs    Financial resource strain: Not on file    Food insecurity:     Worry: Not on file     Inability: Not on file    Transportation needs:     Medical: Not on file     Non-medical: Not on file   Tobacco Use    Smoking status: Never Smoker    Smokeless tobacco: Never Used   Substance and Sexual Activity    Alcohol use:  Yes     Alcohol/week: 0.0 oz     Comment: rarely    Drug use: No    Sexual Examination:    Vitals:    05/14/19 1450   BP: (!) 112/56   Pulse: 80   SpO2: 99%   Weight: 164 lb (74.4 kg)   Height: 5' 5\" (1.651 m)     Body mass index is 27.29 kg/m². Wt Readings from Last 3 Encounters:   05/14/19 164 lb (74.4 kg)   03/22/19 164 lb (74.4 kg)   02/21/19 163 lb (73.9 kg)     BP Readings from Last 3 Encounters:   05/14/19 (!) 112/56   03/22/19 (!) 82/56   02/21/19 84/62        Constitutional and General Appearance:   WD/WN in NAD  HEENT:  NC/AT  Skin:  Warm, dry  Respiratory:  · Normal excursion and expansion without use of accessory muscles  · Resp Auscultation: Normal breath sounds without dullness  Cardiovascular:  · The apical impulses not displaced  · Heart tones are crisp and normal  · Cervical veins are not engorged  · The carotid upstroke is normal in amplitude and contour without delay or bruit  · JVP normal  RRR with nl S1 and S2 without m,r,g.  · Peripheral pulses are symmetrical and full. · There is no clubbing, cyanosis of the extremities. · No edema. · Femoral Arteries: 2+ and equal.  · Pedal Pulses: 2+ and equal.   Neck:  · JVP less than 8 cm H2O  · No thyromegaly  Abdomen:  · No masses or tenderness  · Liver/Spleen: No Abnormalities Noted  Neurological/Psychiatric:  · Alert and oriented in all spheres  · Moves all extremities well  · Exhibits normal gait balance and coordination  · No abnormalities of mood, affect, memory, mentation, or behavior are noted    Diagnostics:    Echo 3/7/19  Summary   -Normal left ventricle size, wall thickness, and systolic function with an estimated ejection fraction of 55-60% visually, 58% by 3D HM and 55% by 3D FV.   -No regional wall motion abnormalities are seen.   -Trivial pulmonic regurgitation present.   -Normal diastolic function. Avg. E/e'=7.75      Echo 8/25/2017   Summary   Normal left ventricle size, wall thickness and systolic function with an   estimated ejection fraction of 55-60%.  Trivial tricuspid regurgitation.     Echo 8/26/2016:  Summary  Normal left ventricle size, wall thickness and systolic function with an EF of 55%. Reversal of E/A inflow velocities across the mitral valve suggesting impaired left ventricular relaxation.     Cardiac Cath 5/8/14:  LM-normal  LAD-normal  Cx- normal  RCA-normal, dominant   LVEF-55%     Hemodynamics: RA mean 4  RV 32/0  PA 32/11 mean 19  PAW 10/9 mean 5  Thermal: C.O. 4.06 and C.I. 2.05  Glenn: C.O. 4.96 and C.I. 2.51      Labs were reviewed including labs from other hospital systems through [facility]. Cardiac testing was reviewed including echos, nuclear scans, cardiac catheterization, including from other hospital systems through [facility]. Assessment:  1. Diastolic CHF, chronic ([address]    2. Orthostatic hypotension    3. Essential hypertension    4. Pure hypercholesterolemia    5. SOB (shortness of breath)      1. Diastolic heart failure, chronic:   3/7/19 Echo> normal LV size and function, normal diastolic function, no significant valvular abnormalities. 3/7/19 proBNP> 22, 5/4/19> 28. Tx with Coreg (3.125 mg BID, dose decreased at prior visit due to low BP), spironolactone. (Losartan stopped d/t low BP)     Appears compensated on exam although she reports shortness of breath several days after taking Wednesday dose  March 2019 labs showed she was likely a little on the dry side. Reduced furosemide at that time to 10mg twice a week. Creat from 5/4/19> 1.3, K 4.2.    2. Orthostatic hypotension, history of  None today although she \"always feels light-headed. \"  Reports light headedness and dizziness when SBP < 90, DBP < 60  Taking furosemide 10mg two days a week. Midodrine 2.5mg TID. Stayed hydrated. 3.  Essential HTN  BP (!) 112/56   Pulse 80   Ht 5' 5\" (1.651 m)   Wt 164 lb (74.4 kg)   SpO2 99%   BMI 27.29 kg/m² .     Low BP reported at 2/21/19 visit >> Losartan on hold, Coreg dec to 3.125 mg BID, furosemide 20mg twice a week only   3/7/19 K+ 4.3, BUN 21, Creat 1.2  Monitor BP at home. 4.  Pure hypercholesteremia  5/4/19> , TG 70, HDL 45, LDL 85 (improved on statin) -- drawn on pravastatin (started at last visit in March 2019), Zetia, and Tricor (intolerant to statins). Can now stop fenofibrate. 3/7/19> , , HDL 43,  -- was not taking statin, but was on Zetia and feno. Plan:  1. Stop fenofibrate since she is tolerating pravastatin  2. CBC, CMP, BNP, lipids in 3 months  3. RTO 4-6 months       QUALITY MEASURES  1. Tobacco Cessation Counseling: NA  2. Retake of BP if >140/90:   NA  3. Documentation to PCP/referring for new patient:  Sent to PCP at close of office visit  4. CAD patient on anti-platelet: NA  5. CAD patient on STATIN therapy:  Yes (statin therapy but no CAD)  6. Patient with CHF and aFib on anticoagulation:  NA    I appreciate the opportunity of cooperating in the care of this patient. Mauri Cullen M.D., [address] [place] [address] attestation: This note was scribed in the presence of Dr. Oneyda Montes MD, by Yocasta Sawyer RN. The scribe's documentation has been prepared under my direction and personally reviewed by me in its entirety. I confirm that the note above accurately reflects all work, treatment, procedures, and medical decision making performed by me.

## 2019-05-31 ENCOUNTER — HOSPITAL ENCOUNTER (OUTPATIENT)
Dept: WOMENS IMAGING | Age: 54
Discharge: HOME | End: 2019-05-31
Payer: COMMERCIAL

## 2019-05-31 DIAGNOSIS — Z12.39 BREAST CANCER SCREENING: ICD-10-CM

## 2019-05-31 DIAGNOSIS — Z80.3 FAMILY HISTORY OF MALIGNANT NEOPLASM OF BREAST: ICD-10-CM

## 2019-05-31 PROCEDURE — 77067 SCR MAMMO BI INCL CAD: CPT

## 2019-06-10 ENCOUNTER — HOSPITAL ENCOUNTER (OUTPATIENT)
Dept: WOMENS IMAGING | Age: 54
Discharge: HOME | End: 2019-06-10
Payer: COMMERCIAL

## 2019-06-10 ENCOUNTER — HOSPITAL ENCOUNTER (OUTPATIENT)
Dept: ULTRASOUND IMAGING | Age: 54
Discharge: HOME | End: 2019-06-10
Payer: COMMERCIAL

## 2019-06-10 DIAGNOSIS — R92.8 ABNORMAL FINDING ON MAMMOGRAPHY: ICD-10-CM

## 2019-06-10 PROCEDURE — G0279 TOMOSYNTHESIS, MAMMO: HCPCS

## 2019-06-10 PROCEDURE — 76642 ULTRASOUND BREAST LIMITED: CPT

## 2019-06-11 ENCOUNTER — OFFICE VISIT (OUTPATIENT)
Dept: NEUROLOGY | Age: 54
End: 2019-06-11
Payer: COMMERCIAL

## 2019-06-11 VITALS
DIASTOLIC BLOOD PRESSURE: 66 MMHG | HEART RATE: 82 BPM | BODY MASS INDEX: 27.49 KG/M2 | WEIGHT: 165 LBS | SYSTOLIC BLOOD PRESSURE: 103 MMHG | HEIGHT: 65 IN

## 2019-06-11 DIAGNOSIS — G43.019 INTRACTABLE MIGRAINE WITHOUT AURA AND WITHOUT STATUS MIGRAINOSUS: Primary | ICD-10-CM

## 2019-06-11 PROCEDURE — 99213 OFFICE O/P EST LOW 20 MIN: CPT | Performed by: PSYCHIATRY & NEUROLOGY

## 2019-06-11 RX ORDER — TOPIRAMATE 50 MG/1
TABLET, FILM COATED ORAL
Qty: 90 TABLET | Refills: 5 | Status: SHIPPED | OUTPATIENT
Start: 2019-06-11 | End: 2019-12-03 | Stop reason: SDUPTHER

## 2019-06-11 NOTE — PATIENT INSTRUCTIONS
Please call with any questions or concerns:   SSLEANNE [facility]  @ [phone]. LAB RESULTS:  Please obtain any labs or diagnostic tests as discussed today. You should call the office to check the results. Please allow  3 to 7 days for us to get these results. MEDICATION LIST:  Please bring an accurate list of your medications to every visit. APPOINTMENT CONFIRMATION:  We will call you the day before your scheduled appointment to confirm. If we are unable to reach you, you MUST call back by the end of the day to confirm the appointment or we may be forced to cancel.

## 2019-06-11 NOTE — PROGRESS NOTES
aneursym - passes 12/2009    Heart Disease Mother     High Blood Pressure Mother     Cancer Paternal Grandmother     Hypertension Other         aunt    Stroke Maternal Grandfather     Cancer Maternal Aunt         breast    High Blood Pressure Maternal Aunt     Cancer Paternal Aunt         lung    Cancer Paternal Uncle         brain    Heart Disease Maternal Grandmother     High Blood Pressure Maternal Grandmother     Heart Disease Paternal Grandfather     Diabetes Neg Hx     Osteoporosis Neg Hx     Thyroid Disease Neg Hx     Anesth Problems Neg Hx     Malig Hyperten Neg Hx     Hypotension Neg Hx     Malig Hypertherm Neg Hx     Pseudochol. Deficiency Neg Hx      Social History     Socioeconomic History    Marital status:      Spouse name: None    Number of children: None    Years of education: None    Highest education level: None   Occupational History    None   Social Needs    Financial resource strain: None    Food insecurity:     Worry: None     Inability: None    Transportation needs:     Medical: None     Non-medical: None   Tobacco Use    Smoking status: Never Smoker    Smokeless tobacco: Never Used   Substance and Sexual Activity    Alcohol use:  Yes     Alcohol/week: 0.0 oz     Comment: rarely    Drug use: No    Sexual activity: Yes     Partners: Male   Lifestyle    Physical activity:     Days per week: None     Minutes per session: None    Stress: None   Relationships    Social connections:     Talks on phone: None     Gets together: None     Attends Orthodoxy service: None     Active member of club or organization: None     Attends meetings of clubs or organizations: None     Relationship status: None    Intimate partner violence:     Fear of current or ex partner: None     Emotionally abused: None     Physically abused: None     Forced sexual activity: None   Other Topics Concern    None   Social History Narrative    None        Objective:  Exam:  /66

## 2019-06-25 ENCOUNTER — HOSPITAL ENCOUNTER (OUTPATIENT)
Age: 54
Discharge: HOME | End: 2019-06-25
Payer: COMMERCIAL

## 2019-06-25 LAB
ANION GAP SERPL CALCULATED.3IONS-SCNC: 13 MMOL/L (ref 3–16)
BUN BLDV-MCNC: 26 MG/DL (ref 7–20)
CALCIUM SERPL-MCNC: 9.8 MG/DL (ref 8.3–10.6)
CHLORIDE BLD-SCNC: 101 MMOL/L (ref 99–110)
CO2: 25 MMOL/L (ref 21–32)
CREAT SERPL-MCNC: 1.1 MG/DL (ref 0.6–1.1)
GFR AFRICAN AMERICAN: >60
GFR NON-AFRICAN AMERICAN: 52
GLUCOSE BLD-MCNC: 121 MG/DL (ref 70–99)
PARATHYROID HORMONE INTACT: 46.5 PG/ML (ref 14–72)
PHOSPHORUS: 3.5 MG/DL (ref 2.5–4.9)
POTASSIUM SERPL-SCNC: 3.5 MMOL/L (ref 3.5–5.1)
PROTEIN PROTEIN: 0.01 G/DL
PROTEIN PROTEIN: 12 MG/DL
SODIUM BLD-SCNC: 139 MMOL/L (ref 136–145)

## 2019-06-25 PROCEDURE — 86334 IMMUNOFIX E-PHORESIS SERUM: CPT

## 2019-06-25 PROCEDURE — 84156 ASSAY OF PROTEIN URINE: CPT

## 2019-06-25 PROCEDURE — 86335 IMMUNFIX E-PHORSIS/URINE/CSF: CPT

## 2019-06-25 PROCEDURE — 83970 ASSAY OF PARATHORMONE: CPT

## 2019-06-25 PROCEDURE — 84166 PROTEIN E-PHORESIS/URINE/CSF: CPT

## 2019-06-25 PROCEDURE — 82542 COL CHROMOTOGRAPHY QUAL/QUAN: CPT

## 2019-06-25 PROCEDURE — 84165 PROTEIN E-PHORESIS SERUM: CPT

## 2019-06-25 PROCEDURE — 84155 ASSAY OF PROTEIN SERUM: CPT

## 2019-06-25 PROCEDURE — 36415 COLL VENOUS BLD VENIPUNCTURE: CPT

## 2019-06-25 PROCEDURE — 80048 BASIC METABOLIC PNL TOTAL CA: CPT

## 2019-06-25 PROCEDURE — 84100 ASSAY OF PHOSPHORUS: CPT

## 2019-06-28 LAB
ALBUMIN SERPL-MCNC: 3.9 G/DL (ref 3.1–4.9)
ALPHA-1-GLOBULIN: 0.2 G/DL (ref 0.2–0.4)
ALPHA-2-GLOBULIN: 0.9 G/DL (ref 0.4–1.1)
BETA GLOBULIN: 1.5 G/DL (ref 0.9–1.6)
GAMMA GLOBULIN: 1.1 G/DL (ref 0.6–1.8)
TOTAL PROTEIN: 7.6 G/DL (ref 6.4–8.2)
URINE ELECTROPHORESIS INTERP: NORMAL

## 2019-06-29 LAB — PTH RELATED PEPTIDE: 3.1 PMOL/L (ref 0–3.4)

## 2019-07-01 LAB — SPE/IFE INTERPRETATION: NORMAL

## 2019-07-30 RX ORDER — PRAVASTATIN SODIUM 10 MG
TABLET ORAL
Qty: 90 TABLET | Refills: 3 | Status: SHIPPED | OUTPATIENT
Start: 2019-07-30 | End: 2020-08-17

## 2019-09-10 ENCOUNTER — TELEPHONE (OUTPATIENT)
Dept: CARDIOLOGY CLINIC | Age: 54
End: 2019-09-10

## 2019-09-11 ENCOUNTER — TELEPHONE (OUTPATIENT)
Dept: CARDIOLOGY CLINIC | Age: 54
End: 2019-09-11

## 2019-09-11 RX ORDER — FUROSEMIDE 40 MG/1
TABLET ORAL
Qty: 30 TABLET | Refills: 8 | Status: SHIPPED | OUTPATIENT
Start: 2019-09-11 | End: 2020-09-21 | Stop reason: SDUPTHER

## 2019-09-14 DIAGNOSIS — R06.02 SOB (SHORTNESS OF BREATH): ICD-10-CM

## 2019-09-14 DIAGNOSIS — E78.00 PURE HYPERCHOLESTEROLEMIA: ICD-10-CM

## 2019-09-14 DIAGNOSIS — I50.32 DIASTOLIC CHF, CHRONIC (HCC): ICD-10-CM

## 2019-09-14 DIAGNOSIS — I10 ESSENTIAL HYPERTENSION: ICD-10-CM

## 2019-09-16 RX ORDER — EZETIMIBE 10 MG/1
TABLET ORAL
Qty: 30 TABLET | Refills: 11 | Status: SHIPPED | OUTPATIENT
Start: 2019-09-16 | End: 2020-09-21 | Stop reason: SDUPTHER

## 2019-09-24 ENCOUNTER — HOSPITAL ENCOUNTER (OUTPATIENT)
Age: 54
Discharge: HOME | End: 2019-09-24
Payer: COMMERCIAL

## 2019-09-24 DIAGNOSIS — R06.02 SOB (SHORTNESS OF BREATH): Chronic | ICD-10-CM

## 2019-09-24 DIAGNOSIS — I50.32 DIASTOLIC CHF, CHRONIC (HCC): Chronic | ICD-10-CM

## 2019-09-24 DIAGNOSIS — E78.00 PURE HYPERCHOLESTEROLEMIA: Chronic | ICD-10-CM

## 2019-09-24 LAB
A/G RATIO: 1.5 (ref 1.1–2.2)
ALBUMIN SERPL-MCNC: 4.2 G/DL (ref 3.4–5)
ALP BLD-CCNC: 95 U/L (ref 40–129)
ALT SERPL-CCNC: 10 U/L (ref 10–40)
ANION GAP SERPL CALCULATED.3IONS-SCNC: 16 MMOL/L (ref 3–16)
AST SERPL-CCNC: 16 U/L (ref 15–37)
BASOPHILS ABSOLUTE: 0 K/UL (ref 0–0.2)
BASOPHILS RELATIVE PERCENT: 0.5 %
BILIRUB SERPL-MCNC: <0.2 MG/DL (ref 0–1)
BUN BLDV-MCNC: 26 MG/DL (ref 7–20)
CALCIUM SERPL-MCNC: 9.4 MG/DL (ref 8.3–10.6)
CHLORIDE BLD-SCNC: 108 MMOL/L (ref 99–110)
CHOLESTEROL, TOTAL: 127 MG/DL (ref 0–199)
CO2: 23 MMOL/L (ref 21–32)
CREAT SERPL-MCNC: 1 MG/DL (ref 0.6–1.1)
EOSINOPHILS ABSOLUTE: 0.2 K/UL (ref 0–0.6)
EOSINOPHILS RELATIVE PERCENT: 3.2 %
GFR AFRICAN AMERICAN: >60
GFR NON-AFRICAN AMERICAN: 58
GLOBULIN: 2.8 G/DL
GLUCOSE BLD-MCNC: 100 MG/DL (ref 70–99)
HCT VFR BLD CALC: 34.8 % (ref 36–48)
HDLC SERPL-MCNC: 41 MG/DL (ref 40–60)
HEMOGLOBIN: 11.5 G/DL (ref 12–16)
LDL CHOLESTEROL CALCULATED: 72 MG/DL
LYMPHOCYTES ABSOLUTE: 1.3 K/UL (ref 1–5.1)
LYMPHOCYTES RELATIVE PERCENT: 27.3 %
MCH RBC QN AUTO: 28.4 PG (ref 26–34)
MCHC RBC AUTO-ENTMCNC: 33 G/DL (ref 31–36)
MCV RBC AUTO: 86 FL (ref 80–100)
MONOCYTES ABSOLUTE: 0.4 K/UL (ref 0–1.3)
MONOCYTES RELATIVE PERCENT: 8.1 %
NEUTROPHILS ABSOLUTE: 2.9 K/UL (ref 1.7–7.7)
NEUTROPHILS RELATIVE PERCENT: 60.9 %
PDW BLD-RTO: 12.6 % (ref 12.4–15.4)
PLATELET # BLD: 270 K/UL (ref 135–450)
PMV BLD AUTO: 9.6 FL (ref 5–10.5)
POTASSIUM SERPL-SCNC: 3.8 MMOL/L (ref 3.5–5.1)
PRO-BNP: 93 PG/ML (ref 0–124)
RBC # BLD: 4.05 M/UL (ref 4–5.2)
SODIUM BLD-SCNC: 147 MMOL/L (ref 136–145)
TOTAL PROTEIN: 7 G/DL (ref 6.4–8.2)
TRIGL SERPL-MCNC: 68 MG/DL (ref 0–150)
VLDLC SERPL CALC-MCNC: 14 MG/DL
WBC # BLD: 4.7 K/UL (ref 4–11)

## 2019-09-24 PROCEDURE — 85025 COMPLETE CBC W/AUTO DIFF WBC: CPT

## 2019-09-24 PROCEDURE — 80061 LIPID PANEL: CPT

## 2019-09-24 PROCEDURE — 83880 ASSAY OF NATRIURETIC PEPTIDE: CPT

## 2019-09-24 PROCEDURE — 36415 COLL VENOUS BLD VENIPUNCTURE: CPT

## 2019-09-24 PROCEDURE — 80053 COMPREHEN METABOLIC PANEL: CPT

## 2019-10-04 ENCOUNTER — OFFICE VISIT (OUTPATIENT)
Dept: CARDIOLOGY CLINIC | Age: 54
End: 2019-10-04
Payer: COMMERCIAL

## 2019-10-04 VITALS
RESPIRATION RATE: 15 BRPM | HEIGHT: 65 IN | DIASTOLIC BLOOD PRESSURE: 60 MMHG | OXYGEN SATURATION: 96 % | SYSTOLIC BLOOD PRESSURE: 96 MMHG | BODY MASS INDEX: 27.63 KG/M2 | HEART RATE: 72 BPM | WEIGHT: 165.8 LBS

## 2019-10-04 DIAGNOSIS — I10 ESSENTIAL HYPERTENSION: ICD-10-CM

## 2019-10-04 DIAGNOSIS — R06.02 SOB (SHORTNESS OF BREATH): Chronic | ICD-10-CM

## 2019-10-04 DIAGNOSIS — E78.00 PURE HYPERCHOLESTEROLEMIA: Chronic | ICD-10-CM

## 2019-10-04 DIAGNOSIS — I50.32 DIASTOLIC CHF, CHRONIC (HCC): Primary | Chronic | ICD-10-CM

## 2019-10-04 PROCEDURE — 99214 OFFICE O/P EST MOD 30 MIN: CPT | Performed by: INTERNAL MEDICINE

## 2019-10-04 RX ORDER — SPIRONOLACTONE 25 MG/1
25 TABLET ORAL DAILY
Qty: 30 TABLET | Refills: 3 | Status: SHIPPED | OUTPATIENT
Start: 2019-10-04 | End: 2020-07-24 | Stop reason: SDUPTHER

## 2019-11-18 LAB
CLINICIAN PROVIDED ICD10: NORMAL
COMMENT: NORMAL
HPV DNA: NORMAL
Lab: NEGATIVE
Lab: NORMAL
Lab: NORMAL
PAP WITH REFLEX HPV: NORMAL
PERFORMED BY:: NORMAL
SPECIMEN ADEQUACY:: NORMAL

## 2019-12-03 ENCOUNTER — OFFICE VISIT (OUTPATIENT)
Dept: NEUROLOGY | Age: 54
End: 2019-12-03
Payer: COMMERCIAL

## 2019-12-03 VITALS
HEART RATE: 72 BPM | SYSTOLIC BLOOD PRESSURE: 95 MMHG | WEIGHT: 165 LBS | DIASTOLIC BLOOD PRESSURE: 64 MMHG | BODY MASS INDEX: 27.46 KG/M2

## 2019-12-03 PROCEDURE — 99213 OFFICE O/P EST LOW 20 MIN: CPT | Performed by: PSYCHIATRY & NEUROLOGY

## 2019-12-03 RX ORDER — TOPIRAMATE 50 MG/1
TABLET, FILM COATED ORAL
Qty: 90 TABLET | Refills: 1 | Status: SHIPPED | OUTPATIENT
Start: 2019-12-03 | End: 2020-06-06 | Stop reason: SDUPTHER

## 2019-12-16 ENCOUNTER — TELEPHONE (OUTPATIENT)
Dept: CARDIOLOGY CLINIC | Age: 54
End: 2019-12-16

## 2019-12-23 ENCOUNTER — APPOINTMENT (OUTPATIENT)
Dept: ULTRASOUND IMAGING | Age: 54
End: 2019-12-23
Payer: COMMERCIAL

## 2019-12-23 ENCOUNTER — HOSPITAL ENCOUNTER (OUTPATIENT)
Dept: WOMENS IMAGING | Age: 54
Discharge: HOME | End: 2019-12-23
Payer: COMMERCIAL

## 2019-12-23 DIAGNOSIS — R92.8 ABNORMAL MAMMOGRAM: ICD-10-CM

## 2019-12-23 PROCEDURE — G0279 TOMOSYNTHESIS, MAMMO: HCPCS

## 2020-01-02 RX ORDER — POTASSIUM CHLORIDE 750 MG/1
TABLET, EXTENDED RELEASE ORAL
Qty: 180 TABLET | Refills: 1 | OUTPATIENT
Start: 2020-01-02

## 2020-05-09 ENCOUNTER — HOSPITAL ENCOUNTER (OUTPATIENT)
Age: 55
Discharge: HOME | End: 2020-05-09
Payer: COMMERCIAL

## 2020-05-09 LAB
A/G RATIO: 1.5 (ref 1.1–2.2)
ALBUMIN SERPL-MCNC: 4.6 G/DL (ref 3.4–5)
ALP BLD-CCNC: 91 U/L (ref 40–129)
ALT SERPL-CCNC: 17 U/L (ref 10–40)
ANION GAP SERPL CALCULATED.3IONS-SCNC: 11 MMOL/L (ref 3–16)
AST SERPL-CCNC: 25 U/L (ref 15–37)
BASOPHILS ABSOLUTE: 0 K/UL (ref 0–0.2)
BASOPHILS RELATIVE PERCENT: 0.6 %
BILIRUB SERPL-MCNC: 0.3 MG/DL (ref 0–1)
BUN BLDV-MCNC: 27 MG/DL (ref 7–20)
CALCIUM SERPL-MCNC: 10 MG/DL (ref 8.3–10.6)
CHLORIDE BLD-SCNC: 103 MMOL/L (ref 99–110)
CHOLESTEROL, TOTAL: 162 MG/DL (ref 0–199)
CO2: 25 MMOL/L (ref 21–32)
CREAT SERPL-MCNC: 1 MG/DL (ref 0.6–1.1)
EOSINOPHILS ABSOLUTE: 0.2 K/UL (ref 0–0.6)
EOSINOPHILS RELATIVE PERCENT: 3.2 %
GFR AFRICAN AMERICAN: >60
GFR NON-AFRICAN AMERICAN: 58
GLOBULIN: 3.1 G/DL
GLUCOSE BLD-MCNC: 91 MG/DL (ref 70–99)
HCT VFR BLD CALC: 40.7 % (ref 36–48)
HDLC SERPL-MCNC: 53 MG/DL (ref 40–60)
HEMOGLOBIN: 13.5 G/DL (ref 12–16)
LDL CHOLESTEROL CALCULATED: 84 MG/DL
LYMPHOCYTES ABSOLUTE: 1.4 K/UL (ref 1–5.1)
LYMPHOCYTES RELATIVE PERCENT: 24.1 %
MCH RBC QN AUTO: 28.8 PG (ref 26–34)
MCHC RBC AUTO-ENTMCNC: 33.3 G/DL (ref 31–36)
MCV RBC AUTO: 86.5 FL (ref 80–100)
MONOCYTES ABSOLUTE: 0.5 K/UL (ref 0–1.3)
MONOCYTES RELATIVE PERCENT: 8.1 %
NEUTROPHILS ABSOLUTE: 3.8 K/UL (ref 1.7–7.7)
NEUTROPHILS RELATIVE PERCENT: 64 %
PDW BLD-RTO: 12.8 % (ref 12.4–15.4)
PLATELET # BLD: 302 K/UL (ref 135–450)
PMV BLD AUTO: 9.9 FL (ref 5–10.5)
POTASSIUM SERPL-SCNC: 4.2 MMOL/L (ref 3.5–5.1)
PRO-BNP: 28 PG/ML (ref 0–124)
RBC # BLD: 4.7 M/UL (ref 4–5.2)
SODIUM BLD-SCNC: 139 MMOL/L (ref 136–145)
TOTAL PROTEIN: 7.7 G/DL (ref 6.4–8.2)
TRIGL SERPL-MCNC: 127 MG/DL (ref 0–150)
VLDLC SERPL CALC-MCNC: 25 MG/DL
WBC # BLD: 5.9 K/UL (ref 4–11)

## 2020-05-09 PROCEDURE — 80061 LIPID PANEL: CPT

## 2020-05-09 PROCEDURE — 83880 ASSAY OF NATRIURETIC PEPTIDE: CPT

## 2020-05-09 PROCEDURE — 80053 COMPREHEN METABOLIC PANEL: CPT

## 2020-05-09 PROCEDURE — 36415 COLL VENOUS BLD VENIPUNCTURE: CPT

## 2020-05-09 PROCEDURE — 85025 COMPLETE CBC W/AUTO DIFF WBC: CPT

## 2020-05-11 RX ORDER — MIDODRINE HYDROCHLORIDE 2.5 MG/1
2.5 TABLET ORAL 3 TIMES DAILY
Qty: 90 TABLET | Refills: 1 | Status: SHIPPED | OUTPATIENT
Start: 2020-05-11 | End: 2021-06-25 | Stop reason: SDUPTHER

## 2020-05-11 RX ORDER — MIDODRINE HYDROCHLORIDE 2.5 MG/1
TABLET ORAL
Qty: 90 TABLET | Refills: 2 | OUTPATIENT
Start: 2020-05-11

## 2020-05-11 NOTE — TELEPHONE ENCOUNTER
Medication Refill    Medication needing refilled:         midodrine (PROAMATINE) 2.5 MG tablet    Doseage of the medication: 2.5 mg    How are you taking this medication (QD, BID, TID, QID, PRN): 1 tablet 3x daily    30 or 90 day supply called in: 80    Which Pharmacy are we sending the medication to?:     [facility] [address] [facility], [place] - [address] [facility] [phone]

## 2020-05-21 ENCOUNTER — OFFICE VISIT (OUTPATIENT)
Dept: CARDIOLOGY CLINIC | Age: 55
End: 2020-05-21
Payer: COMMERCIAL

## 2020-05-21 VITALS
SYSTOLIC BLOOD PRESSURE: 92 MMHG | BODY MASS INDEX: 28.16 KG/M2 | DIASTOLIC BLOOD PRESSURE: 72 MMHG | HEART RATE: 82 BPM | OXYGEN SATURATION: 99 % | HEIGHT: 65 IN | WEIGHT: 169 LBS

## 2020-05-21 PROCEDURE — 99214 OFFICE O/P EST MOD 30 MIN: CPT | Performed by: INTERNAL MEDICINE

## 2020-05-21 RX ORDER — ACETAMINOPHEN 160 MG
2000 TABLET,DISINTEGRATING ORAL DAILY
COMMUNITY
End: 2022-08-10

## 2020-05-21 NOTE — PROGRESS NOTES
(PROAMATINE) 2.5 MG tablet Take 1 tablet by mouth 3 times daily 90 tablet 1    topiramate (TOPAMAX) 50 MG tablet Take 1 tablet daily 90 tablet 1    spironolactone (ALDACTONE) 25 MG tablet Take 1 tablet by mouth daily Decreased by Nephology 2018 to 25mg daily. 30 tablet 3    ezetimibe (ZETIA) 10 MG tablet TAKE ONE TABLET BY MOUTH DAILY 30 tablet 11    furosemide (LASIX) 40 MG tablet TAKE ONE TABLET BY MOUTH DAILY (Patient taking differently: Take 20 mg by mouth daily as needed Indications: takes it about 3 times a week ) 30 tablet 8    pravastatin (PRAVACHOL) 10 MG tablet TAKE ONE TABLET BY MOUTH DAILY 90 tablet 3    carvedilol (COREG) 6.25 MG tablet Take 0.5 tablets by mouth 2 times daily (with meals) 60 tablet 10    nortriptyline (PAMELOR) 10 MG capsule Take 10 mg by mouth 2 times daily      Liraglutide (VICTOZA) 18 MG/3ML SOPN SC injection Inject 1.8 mg into the skin daily        No current facility-administered medications for this visit.         Past Medical History:   Diagnosis Date    Abdominal pain 2010    Arthritis     Asthma     Chest pain 2011    CHF (congestive heart failure) (HCC)     Diastolic heart failure     5/17/10-heart worsening-Dr. Cindy Coyle    Dyspnea     Heart failure, diastolic (HCC)     Hemoptysis 2012    Hyperlipidemia     Hypertension     Nausea & vomiting     PONV (postoperative nausea and vomiting)     Pulmonary hypertension ([facility] [address]     Pulmonary hypertension due to left ventricular diastolic dysfunction (HCC)     Renal failure     Sleep apnea     SUPPOSED TO USE CPAP    Vertigo      Past Surgical History:   Procedure Laterality Date    BREAST CYST EXCISION Right 2017    end     BREAST SURGERY      breast reduction    BRONCHOSCOPY  12    BRONCHOSCOPY  2016    w/ washing     SECTION      CHOLECYSTECTOMY      HERNIA REPAIR      HYSTERECTOMY  2015    LAPAROSCOPIC APPENDECTOMY  6/3/11    LAPAROSCOPY standard drinks     Comment: rarely    Drug use: No    Sexual activity: Yes     Partners: Male   Lifestyle    Physical activity     Days per week: Not on file     Minutes per session: Not on file    Stress: Not on file   Relationships    Social connections     Talks on phone: Not on file     Gets together: Not on file     Attends Mormonism service: Not on file     Active member of club or organization: Not on file     Attends meetings of clubs or organizations: Not on file     Relationship status: Not on file    Intimate partner violence     Fear of current or ex partner: Not on file     Emotionally abused: Not on file     Physically abused: Not on file     Forced sexual activity: Not on file   Other Topics Concern    Not on file   Social History Narrative    Not on file       Review of Systems:   · Constitutional: there has been no unanticipated weight loss. There's been no change in energy level, sleep pattern, or activity level. · Eyes: No visual changes or diplopia. No scleral icterus. · ENT: No Headaches, hearing loss or vertigo. No mouth sores or sore throat. · Cardiovascular: Reviewed in HPI  · Respiratory: No cough or wheezing, no sputum production. No hematemesis. CHUNG  · Gastrointestinal: No abdominal pain, appetite loss, blood in stools. No change in bowel or bladder habits. · Genitourinary: No dysuria, trouble voiding, or hematuria. · Musculoskeletal:  No gait disturbance, weakness or joint complaints. · Integumentary: No rash or pruritis. · Neurological: Positive headaches, No diplopia, change in muscle strength, numbness or tingling. No change in gait, balance, coordination, mood, affect, memory, mentation, behavior. · Psychiatric: No anxiety, no depression. · Endocrine: No malaise, fatigue or temperature intolerance. No excessive thirst, fluid intake, or urination. No tremor.   · Hematologic/Lymphatic: No abnormal bruising or bleeding, blood clots or swollen lymph nodes.  · Allergic/Immunologic: No nasal congestion or hives. Physical Examination:    Vitals:    05/21/20 1210   BP: 92/72   Pulse: 82   SpO2: 99%   Weight: 169 lb (76.7 kg)   Height: 5' 5\" (1.651 m)     Body mass index is 28.12 kg/m². Wt Readings from Last 3 Encounters:   05/21/20 169 lb (76.7 kg)   12/03/19 165 lb (74.8 kg)   10/04/19 165 lb 12.8 oz (75.2 kg)     BP Readings from Last 3 Encounters:   05/21/20 92/72   12/03/19 95/64   10/04/19 96/60      Constitutional and General Appearance:     WD/WN in NAD  HEENT:  NC/AT  Skin:  Warm, dry  Respiratory:  · Normal excursion and expansion without use of accessory muscles  · Resp Auscultation: Normal breath sounds without dullness  Cardiovascular:  · The apical impulses not displaced  · Heart tones are crisp and normal  · Cervical veins are not engorged  · The carotid upstroke is normal in amplitude and contour without delay or bruit  · JVP normal  RRR with nl S1 and S2 without m,r,g.  · Peripheral pulses are symmetrical and full. · There is no clubbing, cyanosis of the extremities. · No edema. · Femoral Arteries: 2+ and equal.  · Pedal Pulses: 2+ and equal.   Neck:  · JVP less than 8 cm H2O  · No thyromegaly  Abdomen:  · No masses or tenderness  · Liver/Spleen: No Abnormalities Noted  Neurological/Psychiatric:  · Alert and oriented in all spheres  · Moves all extremities well  · Exhibits normal gait balance and coordination  · No abnormalities of mood, affect, memory, mentation, or behavior are noted    Diagnostics:    Echo 3/7/19  Summary   -Normal left ventricle size, wall thickness, and systolic function with an estimated ejection fraction of 55-60% visually, 58% by 3D HM and 55% by 3D FV.   -No regional wall motion abnormalities are seen.   -Trivial pulmonic regurgitation present.   -Normal diastolic function.  Avg. E/e'=7.75      Echo 8/25/2017   Summary   Normal left ventricle size, wall thickness and systolic function with an   estimated ejection fraction of 55-60%.  Trivial tricuspid regurgitation. Echo 8/26/2016:  Summary  Normal left ventricle size, wall thickness and systolic function with an EF of 55%. Reversal of E/A inflow velocities across the mitral valve suggesting impaired left ventricular relaxation.     Cardiac Cath 5/8/14:  LM-normal  LAD-normal  Cx- normal  RCA-normal, dominant   LVEF-55%     Hemodynamics: RA mean 4  RV 32/0  PA 32/11 mean 19  PAW 10/9 mean 5  Thermal: C.O. 4.06 and C.I. 2.05  Glenn: C.O. 4.96 and C.I. 2.51      Labs were reviewed including labs from other hospital systems through [facility]. Cardiac testing was reviewed including echos, nuclear scans, cardiac catheterization, including from other hospital systems through [facility]. Assessment:  1. Diastolic CHF, chronic ([address]    2. Essential hypertension    3. Pure hypercholesterolemia    4. SOB (shortness of breath)    5. Mixed hyperlipidemia    6. Vitamin D deficiency        1. Diastolic CHF, chronic ([facility] [address]):  Compensated by exam.     -Continue Spironolactone, Lasix, Coreg.  -No SOB or chest pain.   -She is taking Spironolactone daily and Lasix 3 times a week. She is doing well on this regimen and is compensated on exam.   Labs prior to next visit. Echo 3-7-2019 EF 55-60% with  Normal diastolic function. 2. Essential hypertension: BP 92/72   Pulse 82   Ht 5' 5\" (1.651 m)   Wt 169 lb (76.7 kg)   SpO2 99%   BMI 28.12 kg/m²  Stable. 3. Pure hypercholesterolemia:  5-9-2020 , HDL 53, LDL 84, .   -Continue Pravastatin and Zetia. 4. SOB (shortness of breath):  SOB with stairs. Orthostatic hypotension, history of  Reported light headedness and dizziness when SBP < 90, DBP < 60 in the past  Taking furosemide 3 days a week. Midodrine 2.5mg TID. Reports she is staying hydrated. Plan:  1.   Echo and office visit in 6 months. 2.   Start Vitamin D 2000 units one a day. 3.   Continue the same medications.    Scribe's attestation: This note was scribed in the presence of Moriah Machado M.D. by Obie Coughlin RN     The scribe's documentation has been prepared under my direction and personally reviewed by me in its entirety. I confirm that the note above accurately reflects all work, treatment, procedures, and medical decision making performed by me. QUALITY MEASURES  1. Tobacco Cessation Counseling: NA  2. Retake of BP if >140/90:   NA  3. Documentation to PCP/referring for new patient:  Sent to PCP at close of office visit  4. CAD patient on anti-platelet: NA  5. CAD patient on STATIN therapy:  Yes (statin therapy but no CAD)  6. Patient with CHF and aFib on anticoagulation:  NA      I appreciate the opportunity of cooperating in the care of this patient.       Moriah Machado M.D., [facility] [place]

## 2020-06-06 ENCOUNTER — TELEPHONE (OUTPATIENT)
Dept: NEUROLOGY | Age: 55
End: 2020-06-06

## 2020-06-06 ENCOUNTER — VIRTUAL VISIT (OUTPATIENT)
Dept: NEUROLOGY | Age: 55
End: 2020-06-06
Payer: COMMERCIAL

## 2020-06-06 PROCEDURE — 99213 OFFICE O/P EST LOW 20 MIN: CPT | Performed by: PSYCHIATRY & NEUROLOGY

## 2020-06-06 RX ORDER — TOPIRAMATE 50 MG/1
TABLET, FILM COATED ORAL
Qty: 90 TABLET | Refills: 1 | Status: SHIPPED | OUTPATIENT
Start: 2020-06-06 | End: 2020-10-29 | Stop reason: SDUPTHER

## 2020-06-06 NOTE — PROGRESS NOTES
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/2009    Heart Disease Mother     High Blood Pressure Mother     Cancer Paternal Grandmother     Hypertension Other         aunt    Stroke Maternal Grandfather     Cancer Maternal Aunt         breast    High Blood Pressure Maternal Aunt     Cancer Paternal Aunt         lung    Cancer Paternal Uncle         brain    Heart Disease Maternal Grandmother     High Blood Pressure Maternal Grandmother     Heart Disease Paternal Grandfather     Diabetes Neg Hx     Osteoporosis Neg Hx     Thyroid Disease Neg Hx     Anesth Problems Neg Hx     Malig Hyperten Neg Hx     Hypotension Neg Hx     Malig Hypertherm Neg Hx     Pseudochol. Deficiency Neg Hx      Social History     Socioeconomic History    Marital status:      Spouse name: Not on file    Number of children: Not on file    Years of education: Not on file    Highest education level: Not on file   Occupational History    Not on file   Social Needs    Financial resource strain: Not on file    Food insecurity     Worry: Not on file     Inability: Not on file    Transportation needs     Medical: Not on file     Non-medical: Not on file   Tobacco Use    Smoking status: Never Smoker    Smokeless tobacco: Never Used   Substance and Sexual Activity    Alcohol use:  Yes     Alcohol/week: 0.0 standard drinks     Comment: rarely    Drug use: No    Sexual activity: Yes     Partners: Male   Lifestyle    Physical activity     Days per week: Not on file     Minutes per session: Not on file    Stress: Not on file   Relationships    Social connections     Talks on phone: Not on file     Gets together: Not on file     Attends Catholic service: Not on file     Active member of club or organization: Not on file     Attends meetings of clubs or organizations: Not on file     Relationship status: Not on file    Intimate partner violence     Fear of current or ex partner: Not on file     Emotionally abused: Not on file     Physically abused: Not on file     Forced sexual activity: Not on file   Other Topics Concern    Not on file   Social History Narrative    Not on file        Objective:  Exam:  There were no vitals taken for this visit. This is a well-nourished patient in no acute distress  Awake alert and oriented x3. Speech normal.  Face symmetrical.  Tongue midline. Moves all 4 extremities well. Gait normal.    Data :  LABS:  General Labs:    CBC:   Lab Results   Component Value Date    WBC 5.9 05/09/2020    RBC 4.70 05/09/2020    HGB 13.5 05/09/2020    HCT 40.7 05/09/2020    MCV 86.5 05/09/2020    MCH 28.8 05/09/2020    MCHC 33.3 05/09/2020    RDW 12.8 05/09/2020     05/09/2020    MPV 9.9 05/09/2020     BMP:    Lab Results   Component Value Date     05/09/2020    K 4.2 05/09/2020     05/09/2020    CO2 25 05/09/2020    BUN 27 05/09/2020    LABALBU 4.6 05/09/2020    CREATININE 1.0 05/09/2020    CALCIUM 10.0 05/09/2020    GFRAA >60 05/09/2020    GFRAA >60 04/02/2013    LABGLOM 58 05/09/2020    GLUCOSE 91 05/09/2020    GLUCOSE 192 08/19/2015     RADIOLOGY REVIEW:  I have reviewed radiology report(s) of: MRI brain    Impression :  Migraine headaches stable  Previous history of schwannoma status post surgery  History of diabetic neuropathy    Plan :  Discussed with patient  Refilled topiramate   Patient aware of side effects  Continue with good control of diabetes  Return in 6 months      Please note a portion of  this chart was generated using dragon dictation software. Although every effort was made to ensure the accuracy of this automated transcription, some errors in transcription may have occurred.      Pursuant to the emergency declaration under the [facility], [address] [place] [address] authority and the [employer] and [employer] Act, this Virtual  Visit was conducted, with patient's consent, to reduce the patient's risk of exposure to COVID-19 and provide continuity of care for an established patient. Services were provided through a video synchronous discussion virtually to substitute for in-person clinic visit.

## 2020-06-19 ENCOUNTER — HOSPITAL ENCOUNTER (OUTPATIENT)
Dept: WOMENS IMAGING | Age: 55
Discharge: HOME | End: 2020-06-19
Payer: COMMERCIAL

## 2020-06-19 ENCOUNTER — APPOINTMENT (OUTPATIENT)
Dept: ULTRASOUND IMAGING | Age: 55
End: 2020-06-19
Payer: COMMERCIAL

## 2020-06-19 PROCEDURE — G0279 TOMOSYNTHESIS, MAMMO: HCPCS

## 2020-07-24 ENCOUNTER — TELEPHONE (OUTPATIENT)
Dept: CARDIOLOGY CLINIC | Age: 55
End: 2020-07-24

## 2020-07-24 RX ORDER — SPIRONOLACTONE 25 MG/1
25 TABLET ORAL DAILY
Qty: 90 TABLET | Refills: 1 | Status: SHIPPED | OUTPATIENT
Start: 2020-07-24 | End: 2021-01-18

## 2020-07-24 NOTE — TELEPHONE ENCOUNTER
There has been a rx request for spironolactone pending since 7/19/20 . She is TOTALLY out of med . Please send to doctor on call to sign .

## 2020-08-17 ENCOUNTER — TELEPHONE (OUTPATIENT)
Dept: CARDIOLOGY CLINIC | Age: 55
End: 2020-08-17

## 2020-08-17 RX ORDER — PRAVASTATIN SODIUM 10 MG
TABLET ORAL
Qty: 90 TABLET | Refills: 3 | Status: SHIPPED | OUTPATIENT
Start: 2020-08-17 | End: 2021-08-16 | Stop reason: SDUPTHER

## 2020-09-21 RX ORDER — EZETIMIBE 10 MG/1
TABLET ORAL
Qty: 90 TABLET | Refills: 3 | Status: SHIPPED
Start: 2020-09-21 | End: 2020-11-30 | Stop reason: SDUPTHER

## 2020-09-21 RX ORDER — FUROSEMIDE 40 MG/1
20 TABLET ORAL DAILY PRN
Qty: 90 TABLET | Refills: 3 | Status: SHIPPED | OUTPATIENT
Start: 2020-09-21 | End: 2020-11-30

## 2020-09-22 RX ORDER — FUROSEMIDE 40 MG/1
TABLET ORAL
Qty: 90 TABLET | Refills: 3 | Status: SHIPPED | OUTPATIENT
Start: 2020-09-22 | End: 2021-10-25 | Stop reason: SDUPTHER

## 2020-09-22 RX ORDER — EZETIMIBE 10 MG/1
TABLET ORAL
Qty: 90 TABLET | Refills: 3 | Status: SHIPPED | OUTPATIENT
Start: 2020-09-22 | End: 2021-09-13 | Stop reason: SDUPTHER

## 2020-10-05 ENCOUNTER — TELEPHONE (OUTPATIENT)
Dept: NEUROLOGY | Age: 55
End: 2020-10-05

## 2020-10-29 ENCOUNTER — OFFICE VISIT (OUTPATIENT)
Dept: NEUROLOGY | Age: 55
End: 2020-10-29
Payer: COMMERCIAL

## 2020-10-29 VITALS
SYSTOLIC BLOOD PRESSURE: 100 MMHG | HEART RATE: 86 BPM | DIASTOLIC BLOOD PRESSURE: 66 MMHG | BODY MASS INDEX: 28.82 KG/M2 | HEIGHT: 65 IN | TEMPERATURE: 97.5 F | WEIGHT: 173 LBS

## 2020-10-29 PROCEDURE — 99214 OFFICE O/P EST MOD 30 MIN: CPT | Performed by: PSYCHIATRY & NEUROLOGY

## 2020-10-29 RX ORDER — TOPIRAMATE 50 MG/1
50 TABLET, FILM COATED ORAL 2 TIMES DAILY
Qty: 60 TABLET | Refills: 5 | Status: SHIPPED | OUTPATIENT
Start: 2020-10-29 | End: 2020-12-28 | Stop reason: SDUPTHER

## 2020-10-29 NOTE — PROGRESS NOTES
Amilcar Stevenson   Neurology followup    Subjective:   CC/HP  History was obtained from the patient. Interval history:  Patient states that the headaches are reasonably well controlled. She gets occasional stress headache. No major side effects of topiramate. She has been following a strict diet and avoiding chocolate and nitrate-containing foods. New symptom: Patient states that ever since she had her brain surgery for the schwannoma she has noticed some head tremors for the last few months. It seems to happen when she is still and concentrating on something on her computer. She feels that it is not related to stress or does not get worse with stress. Her primary physician felt that it was due to the brain surgery. There is no family history of similar tremors. Detailed history:  Patient has chronic migraine headaches. No significant side effects of medication  Headaches are usually described as mainly in the left parietal occipital region and are throbbing. Bright light and loud noise used to make the headaches worse.       REVIEW OF SYSTEMS    Constitutional:  []   Chills   [x]  Fatigue   []  Fevers   []  Malaise   []  Weight loss     [] Denies all of the above    Respiratory:   []  Cough    []  Shortness of breath         [x] Denies all of the above     Cardiovascular:   []  Chest pain    []  Exertional chest pressure/discomfort           [] Palpitations    [x]  Syncope     [] Denies all of the above        Past Medical History:   Diagnosis Date    Abdominal pain 9/9/2010    Arthritis     Asthma     Chest pain 6/22/2011    CHF (congestive heart failure) (HCC)     Diastolic heart failure     5/17/10-heart worsening-Dr. Kobe Kingston    Dyspnea     Heart failure, diastolic (HCC)     Hemoptysis 5/14/2012    Hyperlipidemia     Hypertension     Nausea & vomiting     PONV (postoperative nausea and vomiting)     Pulmonary hypertension ([address]     Pulmonary hypertension due to left ventricular diastolic dysfunction (HCC)     Renal failure     Sleep apnea     SUPPOSED TO USE CPAP    Vertigo      Family History   Problem Relation Age of Onset    Cancer Father         lung    Hypertension Father     High Blood Pressure Father     Hypertension Brother     High Blood Pressure Brother     Hypertension Mother    Reinaldo Hansen Other Mother         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/2009    Heart Disease Mother     High Blood Pressure Mother     Cancer Paternal Grandmother     Hypertension Other         aunt    Stroke Maternal Grandfather     Cancer Maternal Aunt         breast    High Blood Pressure Maternal Aunt     Cancer Paternal Aunt         lung    Cancer Paternal Uncle         brain    Heart Disease Maternal Grandmother     High Blood Pressure Maternal Grandmother     Heart Disease Paternal Grandfather     Diabetes Neg Hx     Osteoporosis Neg Hx     Thyroid Disease Neg Hx     Anesth Problems Neg Hx     Malig Hyperten Neg Hx     Hypotension Neg Hx     Malig Hypertherm Neg Hx     Pseudochol. Deficiency Neg Hx      Social History     Socioeconomic History    Marital status:      Spouse name: None    Number of children: None    Years of education: None    Highest education level: None   Occupational History    None   Social Needs    Financial resource strain: None    Food insecurity     Worry: None     Inability: None    Transportation needs     Medical: None     Non-medical: None   Tobacco Use    Smoking status: Never Smoker    Smokeless tobacco: Never Used   Substance and Sexual Activity    Alcohol use:  Yes     Alcohol/week: 0.0 standard drinks     Comment: rarely    Drug use: No    Sexual activity: Yes     Partners: Male   Lifestyle    Physical activity     Days per week: None     Minutes per session: None    Stress: None   Relationships    Social connections     Talks on phone: None     Gets together: None     Attends Jehovah's witness service: None     Active member of club or organization: None     Attends meetings of clubs or organizations: None     Relationship status: None    Intimate partner violence     Fear of current or ex partner: None     Emotionally abused: None     Physically abused: None     Forced sexual activity: None   Other Topics Concern    None   Social History Narrative    None        Objective:  Exam:  /66   Pulse 86   Temp 97.5 °F (36.4 °C)   Ht 5' 5\" (1.651 m)   Wt 173 lb (78.5 kg)   BMI 28.79 kg/m²   This is a well-nourished patient in no acute distress  Patient is awake, alert and oriented x3. Speech is normal.  Pupils are equal round reacting to light. Extraocular movements intact. Face symmetrical. Tongue midline. Motor exam shows normal symmetrical strength. Deep tendon reflexes decreased in the lower extremities. Plantar reflexes downgoing. Sensory exam shows decreased light touch in the distal lower extremities Coordination normal. Gait normal. No carotid bruit. No neck stiffness. Data :  LABS:  General Labs:    CBC:   Lab Results   Component Value Date    WBC 5.9 05/09/2020    RBC 4.70 05/09/2020    HGB 13.5 05/09/2020    HCT 40.7 05/09/2020    MCV 86.5 05/09/2020    MCH 28.8 05/09/2020    MCHC 33.3 05/09/2020    RDW 12.8 05/09/2020     05/09/2020    MPV 9.9 05/09/2020     BMP:    Lab Results   Component Value Date     05/09/2020    K 4.2 05/09/2020     05/09/2020    CO2 25 05/09/2020    BUN 27 05/09/2020    LABALBU 4.6 05/09/2020    CREATININE 1.0 05/09/2020    CALCIUM 10.0 05/09/2020    GFRAA >60 05/09/2020    GFRAA >60 04/02/2013    LABGLOM 58 05/09/2020    GLUCOSE 91 05/09/2020    GLUCOSE 192 08/19/2015     RADIOLOGY REVIEW:  MRI brain    Impression :  Migraine headaches reasonably well controlled  Previous history of schwannoma and status post surgery  Diabetic neuropathy  New symptom of her tremors. Unclear etiology.   Neuro exam is otherwise normal.      Plan :  Discussed with patient  Continue to avoid chocolate and nitrite-containing foods  Continue topiramate 50 mg, twice daily  Side effects were discussed. Strict control of diabetes was discussed  Recommended that she check with her neurosurgeon regarding the tremors since it started after the surgery but I doubt it is truly related at this time. Return in 6 months      Please note a portion of  this chart was generated using dragon dictation software. Although every effort was made to ensure the accuracy of this automated transcription, some errors in transcription may have occurred.

## 2020-11-20 ENCOUNTER — HOSPITAL ENCOUNTER (OUTPATIENT)
Age: 55
Discharge: HOME | End: 2020-11-20
Payer: COMMERCIAL

## 2020-11-20 ENCOUNTER — TELEPHONE (OUTPATIENT)
Dept: CARDIOLOGY CLINIC | Age: 55
End: 2020-11-20

## 2020-11-20 LAB
A/G RATIO: 1.5 (ref 1.1–2.2)
ALBUMIN SERPL-MCNC: 4.8 G/DL (ref 3.4–5)
ALP BLD-CCNC: 82 U/L (ref 40–129)
ALT SERPL-CCNC: 21 U/L (ref 10–40)
ANION GAP SERPL CALCULATED.3IONS-SCNC: 12 MMOL/L (ref 3–16)
AST SERPL-CCNC: 24 U/L (ref 15–37)
BASOPHILS ABSOLUTE: 0 K/UL (ref 0–0.2)
BASOPHILS RELATIVE PERCENT: 0.5 %
BILIRUB SERPL-MCNC: 0.4 MG/DL (ref 0–1)
BUN BLDV-MCNC: 25 MG/DL (ref 7–20)
C-REACTIVE PROTEIN: 0.5 MG/L (ref 0–5.1)
CALCIUM SERPL-MCNC: 9.2 MG/DL (ref 8.3–10.6)
CHLORIDE BLD-SCNC: 101 MMOL/L (ref 99–110)
CHOLESTEROL, TOTAL: 172 MG/DL (ref 0–199)
CO2: 26 MMOL/L (ref 21–32)
CREAT SERPL-MCNC: 1.1 MG/DL (ref 0.6–1.1)
EOSINOPHILS ABSOLUTE: 0.1 K/UL (ref 0–0.6)
EOSINOPHILS RELATIVE PERCENT: 2.3 %
GFR AFRICAN AMERICAN: >60
GFR NON-AFRICAN AMERICAN: 52
GLOBULIN: 3.2 G/DL
GLUCOSE BLD-MCNC: 103 MG/DL (ref 70–99)
HCT VFR BLD CALC: 41.5 % (ref 36–48)
HDLC SERPL-MCNC: 48 MG/DL (ref 40–60)
HEMOGLOBIN: 13.9 G/DL (ref 12–16)
LDL CHOLESTEROL CALCULATED: 100 MG/DL
LYMPHOCYTES ABSOLUTE: 1.3 K/UL (ref 1–5.1)
LYMPHOCYTES RELATIVE PERCENT: 23.9 %
MCH RBC QN AUTO: 28.7 PG (ref 26–34)
MCHC RBC AUTO-ENTMCNC: 33.5 G/DL (ref 31–36)
MCV RBC AUTO: 85.9 FL (ref 80–100)
MONOCYTES ABSOLUTE: 0.3 K/UL (ref 0–1.3)
MONOCYTES RELATIVE PERCENT: 5.1 %
NEUTROPHILS ABSOLUTE: 3.6 K/UL (ref 1.7–7.7)
NEUTROPHILS RELATIVE PERCENT: 68.2 %
PDW BLD-RTO: 12.8 % (ref 12.4–15.4)
PLATELET # BLD: 278 K/UL (ref 135–450)
PMV BLD AUTO: 9.7 FL (ref 5–10.5)
POTASSIUM SERPL-SCNC: 3.8 MMOL/L (ref 3.5–5.1)
PRO-BNP: 25 PG/ML (ref 0–124)
RBC # BLD: 4.84 M/UL (ref 4–5.2)
SEDIMENTATION RATE, ERYTHROCYTE: 23 MM/HR (ref 0–30)
SODIUM BLD-SCNC: 139 MMOL/L (ref 136–145)
TOTAL PROTEIN: 8 G/DL (ref 6.4–8.2)
TRIGL SERPL-MCNC: 120 MG/DL (ref 0–150)
VITAMIN D 25-HYDROXY: 72.7 NG/ML
VLDLC SERPL CALC-MCNC: 24 MG/DL
WBC # BLD: 5.3 K/UL (ref 4–11)

## 2020-11-20 PROCEDURE — 80061 LIPID PANEL: CPT

## 2020-11-20 PROCEDURE — 86140 C-REACTIVE PROTEIN: CPT

## 2020-11-20 PROCEDURE — 82306 VITAMIN D 25 HYDROXY: CPT

## 2020-11-20 PROCEDURE — 83880 ASSAY OF NATRIURETIC PEPTIDE: CPT

## 2020-11-20 PROCEDURE — 85652 RBC SED RATE AUTOMATED: CPT

## 2020-11-20 PROCEDURE — 83036 HEMOGLOBIN GLYCOSYLATED A1C: CPT

## 2020-11-20 PROCEDURE — 80053 COMPREHEN METABOLIC PANEL: CPT

## 2020-11-20 PROCEDURE — 85025 COMPLETE CBC W/AUTO DIFF WBC: CPT

## 2020-11-20 PROCEDURE — 36415 COLL VENOUS BLD VENIPUNCTURE: CPT

## 2020-11-21 LAB
ESTIMATED AVERAGE GLUCOSE: 119.8 MG/DL
HBA1C MFR BLD: 5.8 %

## 2020-11-30 ENCOUNTER — OFFICE VISIT (OUTPATIENT)
Dept: CARDIOLOGY CLINIC | Age: 55
End: 2020-11-30
Payer: COMMERCIAL

## 2020-11-30 ENCOUNTER — HOSPITAL ENCOUNTER (OUTPATIENT)
Dept: NON INVASIVE DIAGNOSTICS | Age: 55
Discharge: HOME | End: 2020-11-30
Payer: COMMERCIAL

## 2020-11-30 VITALS
DIASTOLIC BLOOD PRESSURE: 68 MMHG | SYSTOLIC BLOOD PRESSURE: 94 MMHG | HEIGHT: 65 IN | WEIGHT: 171 LBS | BODY MASS INDEX: 28.49 KG/M2 | HEART RATE: 77 BPM | OXYGEN SATURATION: 99 %

## 2020-11-30 LAB
LV EF: 58 %
LVEF MODALITY: NORMAL

## 2020-11-30 PROCEDURE — 93306 TTE W/DOPPLER COMPLETE: CPT

## 2020-11-30 PROCEDURE — 99214 OFFICE O/P EST MOD 30 MIN: CPT | Performed by: INTERNAL MEDICINE

## 2020-11-30 NOTE — PATIENT INSTRUCTIONS
Plan:  1. Continue same medications. 2.  Fasting labs in 6 months. 3.  See Dr. Kalli Ross in 6 months.

## 2020-11-30 NOTE — PROGRESS NOTES
[address]   Advanced Heart Failure/Pulmonary Hypertension  Cardiac Follow up       Mily Flower  YOB: 1965    Date of Visit:  11/30/20    Chief Complaint   Patient presents with    Congestive Heart Failure     History of Present Illness:  Mily Flower is a 48 y.o. female with past medical history significant for dCHF, PHTN, NIRALI, HTN, and CKD. Valsartan was discontinued in May 2018 secondary symptomatic hypotension with it's use (97/52). She sees Dr. Arron Corley neurology for headaches. 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 Alma Jefferson at [facility]. 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 She has been seeing Dr. Arron Corley for headches and is taking Topiramate 50mg  one in the AM and two tabs at HS; (history of congenital migraines -- may need brain surgery to insert fat). He wanted to prescribe Verapamil for headaches, but her BP runs low and she takes Midodrine. Today she states she is working from home. She is taking Midodrine 3 days week on Mon, Wed and Fri. She states her youngest daughter and her 35year old boyfriend have Covid. He has asthma and he is not doing very well. Denies chest pain, shortness of breath, syncope, orthopnea, palpitations, or dizziness. Intermittent SOB. She has not been exercising as usual with the cold.       NYHA Class 2      Allergies   Allergen Reactions    Lisinopril Other (See Comments)     cough    Statins Other (See Comments)     Light-headedness, cp, fast HR    Dilaudid [Hydromorphone Hcl] Nausea And Vomiting    Skelaxin [Metaxalone] Palpitations     Heart beats fast     Current Outpatient Medications   Medication Sig Dispense Refill    topiramate (TOPAMAX) 50 MG tablet Take 1 tablet by mouth 2 times daily Take 1 tablet daily 60 tablet 5    furosemide (LASIX) 40 MG tablet TAKE ONE TABLET BY MOUTH LAPAROSCOPY  4/28/2011    operative, with lysis of adhesions    NEUROMA SURGERY      OTHER SURGICAL HISTORY  2009/2010    3 rt heart caths and 2 left heart caths    SALPINGO-OOPHORECTOMY  6/3/11    Laparoscopic Right    SHOULDER SURGERY      SHOULDER SURGERY Right 01/2018    UPPER GASTROINTESTINAL ENDOSCOPY  9/13/11    biopsy    UPPER GASTROINTESTINAL ENDOSCOPY  9/14/2011    EUS     Family History   Problem Relation Age of Onset    Cancer Father         lung    Hypertension Father     High Blood Pressure Father     Hypertension Brother     High Blood Pressure Brother     Hypertension Mother    Dossie Carole Other Mother         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/2009    Heart Disease Mother     High Blood Pressure Mother     Cancer Paternal Grandmother     Hypertension Other         aunt    Stroke Maternal Grandfather     Cancer Maternal Aunt         breast    High Blood Pressure Maternal Aunt     Cancer Paternal Aunt         lung    Cancer Paternal Uncle         brain    Heart Disease Maternal Grandmother     High Blood Pressure Maternal Grandmother     Heart Disease Paternal Grandfather     Diabetes Neg Hx     Osteoporosis Neg Hx     Thyroid Disease Neg Hx     Anesth Problems Neg Hx     Malig Hyperten Neg Hx     Hypotension Neg Hx     Malig Hypertherm Neg Hx     Pseudochol. Deficiency Neg Hx      Social History     Socioeconomic History    Marital status:      Spouse name: Not on file    Number of children: Not on file    Years of education: Not on file    Highest education level: Not on file   Occupational History    Not on file   Social Needs    Financial resource strain: Not on file    Food insecurity     Worry: Not on file     Inability: Not on file    Transportation needs     Medical: Not on file     Non-medical: Not on file   Tobacco Use    Smoking status: Never Smoker    Smokeless tobacco: Never Used   Substance and Sexual Activity    Alcohol use:  Yes Alcohol/week: 0.0 standard drinks     Comment: rarely    Drug use: No    Sexual activity: Yes     Partners: Male   Lifestyle    Physical activity     Days per week: Not on file     Minutes per session: Not on file    Stress: Not on file   Relationships    Social connections     Talks on phone: Not on file     Gets together: Not on file     Attends Orthodox service: Not on file     Active member of club or organization: Not on file     Attends meetings of clubs or organizations: Not on file     Relationship status: Not on file    Intimate partner violence     Fear of current or ex partner: Not on file     Emotionally abused: Not on file     Physically abused: Not on file     Forced sexual activity: Not on file   Other Topics Concern    Not on file   Social History Narrative    Not on file       Review of Systems:   · Constitutional: there has been no unanticipated weight loss. There's been no change in energy level, sleep pattern, or activity level. · Eyes: No visual changes or diplopia. No scleral icterus. · ENT: No Headaches, hearing loss or vertigo. No mouth sores or sore throat. · Cardiovascular: Reviewed in HPI  · Respiratory: No cough or wheezing, no sputum production. No hematemesis. CHUNG  · Gastrointestinal: No abdominal pain, appetite loss, blood in stools. No change in bowel or bladder habits. · Genitourinary: No dysuria, trouble voiding, or hematuria. · Musculoskeletal:  No gait disturbance, weakness or joint complaints. · Integumentary: No rash or pruritis. · Neurological: Positive headaches, No diplopia, change in muscle strength, numbness or tingling. No change in gait, balance, coordination, mood, affect, memory, mentation, behavior. · Psychiatric: No anxiety, no depression. · Endocrine: No malaise, fatigue or temperature intolerance. No excessive thirst, fluid intake, or urination. No tremor.   · Hematologic/Lymphatic: No abnormal bruising or bleeding, blood clots or swollen lymph nodes.  · Allergic/Immunologic: No nasal congestion or hives. Physical Examination:    Vitals:    11/30/20 1008   BP: 94/68   Pulse: 77   SpO2: 99%   Weight: 171 lb (77.6 kg)   Height: 5' 5\" (1.651 m)     Body mass index is 28.46 kg/m². Wt Readings from Last 3 Encounters:   11/30/20 171 lb (77.6 kg)   10/29/20 173 lb (78.5 kg)   05/21/20 169 lb (76.7 kg)     BP Readings from Last 3 Encounters:   11/30/20 94/68   10/29/20 100/66   05/21/20 92/72      Constitutional and General Appearance:     WD/WN in NAD  HEENT:  NC/AT  Skin:  Warm, dry  Respiratory:  · Normal excursion and expansion without use of accessory muscles  · Resp Auscultation: Normal breath sounds without dullness  Cardiovascular:  · The apical impulses not displaced  · Heart tones are crisp and normal  · Cervical veins are not engorged  · The carotid upstroke is normal in amplitude and contour without delay or bruit  · JVP normal  RRR with nl S1 and S2 without m,r,g.  · Peripheral pulses are symmetrical and full. · There is no clubbing, cyanosis of the extremities. · No edema. · Femoral Arteries: 2+ and equal.  · Pedal Pulses: 2+ and equal.   Neck:  · JVP less than 8 cm H2O  · No thyromegaly  Abdomen:  · No masses or tenderness  · Liver/Spleen: No Abnormalities Noted  Neurological/Psychiatric:  · Alert and oriented in all spheres  · Moves all extremities well  · Exhibits normal gait balance and coordination  · No abnormalities of mood, affect, memory, mentation, or behavior are noted    Diagnostics:  Echo 11/30/2020          Echo 3/7/19  Summary   -Normal left ventricle size, wall thickness, and systolic function with an estimated ejection fraction of 55-60% visually, 58% by 3D HM and 55% by 3D FV.   -No regional wall motion abnormalities are seen.   -Trivial pulmonic regurgitation present.   -Normal diastolic function.  Avg. E/e'=7.75      Echo 8/25/2017   Summary   Normal left ventricle size, wall thickness and systolic function with an   estimated ejection fraction of 55-60%.  Trivial tricuspid regurgitation. Echo 8/26/2016:  Summary  Normal left ventricle size, wall thickness and systolic function with an EF of 55%. Reversal of E/A inflow velocities across the mitral valve suggesting impaired left ventricular relaxation.     Cardiac Cath 5/8/14:  LM-normal  LAD-normal  Cx- normal  RCA-normal, dominant   LVEF-55%     Hemodynamics: RA mean 4  RV 32/0  PA 32/11 mean 19  PAW 10/9 mean 5  Thermal: C.O. 4.06 and C.I. 2.05  Glenn: C.O. 4.96 and C.I. 2.51      Labs were reviewed including labs from other hospital systems through [facility]. Cardiac testing was reviewed including echos, nuclear scans, cardiac catheterization, including from other hospital systems through [facility]. Assessment:  1. Diastolic CHF, chronic ([facility] [address]    2. Essential hypertension    3. Pure hypercholesterolemia    4. SOB (shortness of breath)        1. Diastolic CHF, chronic ([facility] [address]):  Compensated by exam.     -Continue Spironolactone, Lasix, Coreg. Midodrine Mon, Wed, Fri.   -No SOB or chest pain.   -She is taking Spironolactone daily and Lasix 2-3 times a week. She is doing well on this regimen and is compensated on exam.   Labs prior to next visit. Echo 3-7-2019 EF 55-60% with  Normal diastolic function.  -Echo 11/30/2020 pending. 2. Essential hypertension: BP 94/68   Pulse 77   Ht 5' 5\" (1.651 m)   Wt 171 lb (77.6 kg)   SpO2 99%   BMI 28.46 kg/m²  Stable. 3. Pure hypercholesterolemia:  5-9-2020 , HDL 53, LDL 84, .   -Continue Pravastatin and Zetia. -11/20/2020 , HDL 48, , . Controlled. Discussed higher LDL related to Covid. 4. SOB (shortness of breath):  SOB with stairs. Orthostatic hypotension, history of  Reported light headedness and dizziness when SBP < 90, DBP < 60 in the past  Taking furosemide 3 days a week. Midodrine 2.5mg (3 times a week)  Reports she is staying hydrated. Plan:  1. Continue same medications. 2.  Fasting labs in 6 months. 3.  See Dr. Naeem Salgado in 6 months. Scribe's attestation: This note was scribed in the presence of Thad Bowie M.D. by Holden Tirado RN     The scribe's documentation has been prepared under my direction and personally reviewed by me in its entirety. I confirm that the note above accurately reflects all work, treatment, procedures, and medical decision making performed by me. QUALITY MEASURES  1. Tobacco Cessation Counseling: NA  2. Retake of BP if >140/90:   NA  3. Documentation to PCP/referring for new patient:  Sent to PCP at close of office visit  4. CAD patient on anti-platelet: NA  5. CAD patient on STATIN therapy:  Yes (statin therapy but no CAD)  6. Patient with CHF and aFib on anticoagulation:  NA      I appreciate the opportunity of cooperating in the care of this patient.       Thad Bowie M.D., [facility]

## 2020-12-07 ENCOUNTER — OFFICE VISIT (OUTPATIENT)
Dept: NEUROLOGY | Age: 55
End: 2020-12-07
Payer: COMMERCIAL

## 2020-12-07 VITALS
TEMPERATURE: 97.2 F | WEIGHT: 170 LBS | BODY MASS INDEX: 28.32 KG/M2 | RESPIRATION RATE: 14 BRPM | HEART RATE: 80 BPM | DIASTOLIC BLOOD PRESSURE: 74 MMHG | SYSTOLIC BLOOD PRESSURE: 110 MMHG | HEIGHT: 65 IN

## 2020-12-07 PROCEDURE — 99214 OFFICE O/P EST MOD 30 MIN: CPT | Performed by: PSYCHIATRY & NEUROLOGY

## 2020-12-07 RX ORDER — ESTRADIOL 0.5 MG/1
TABLET ORAL
COMMUNITY
Start: 2019-12-08 | End: 2021-11-29

## 2020-12-07 RX ORDER — ALBUTEROL SULFATE 90 UG/1
2 AEROSOL, METERED RESPIRATORY (INHALATION) EVERY 6 HOURS PRN
COMMUNITY
Start: 2020-07-30

## 2020-12-07 RX ORDER — FENOFIBRATE 145 MG/1
1 TABLET, COATED ORAL DAILY
COMMUNITY

## 2020-12-07 RX ORDER — LORAZEPAM 0.5 MG/1
TABLET ORAL
COMMUNITY
Start: 2020-11-06 | End: 2021-06-18 | Stop reason: ALTCHOICE

## 2020-12-07 SDOH — HEALTH STABILITY: MENTAL HEALTH: HOW OFTEN DO YOU HAVE A DRINK CONTAINING ALCOHOL?: NEVER

## 2020-12-07 NOTE — PROGRESS NOTES
Dyspnea     Heart failure, diastolic (HCC)     Hemoptysis 5/14/2012    Hyperlipidemia     Hypertension     Nausea & vomiting     PONV (postoperative nausea and vomiting)     Pulmonary hypertension (HCC)     Pulmonary hypertension due to left ventricular diastolic dysfunction (HCC)     Renal failure     Sleep apnea     SUPPOSED TO USE CPAP    Vertigo      Family History   Problem Relation Age of Onset    Cancer Father         lung    Hypertension Father     High Blood Pressure Father     Hypertension Brother     High Blood Pressure Brother     Hypertension Mother    [facility] Other Mother         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/2009    Heart Disease Mother     High Blood Pressure Mother     Cancer Paternal Grandmother     Hypertension Other         aunt    Stroke Maternal Grandfather     Cancer Maternal Aunt         breast    High Blood Pressure Maternal Aunt     Cancer Paternal Aunt         lung    Cancer Paternal Uncle         brain    Heart Disease Maternal Grandmother     High Blood Pressure Maternal Grandmother     Heart Disease Paternal Grandfather     Diabetes Neg Hx     Osteoporosis Neg Hx     Thyroid Disease Neg Hx     Anesth Problems Neg Hx     Malig Hyperten Neg Hx     Hypotension Neg Hx     Malig Hypertherm Neg Hx     Pseudochol. Deficiency Neg Hx      Social History     Socioeconomic History    Marital status:      Spouse name: None    Number of children: None    Years of education: None    Highest education level: None   Occupational History    None   Social Needs    Financial resource strain: None    Food insecurity     Worry: None     Inability: None    Transportation needs     Medical: None     Non-medical: None   Tobacco Use    Smoking status: Never Smoker    Smokeless tobacco: Never Used   Substance and Sexual Activity    Alcohol use:  Yes     Alcohol/week: 0.0 standard drinks     Comment: rarely    Drug use: No    Sexual activity: Yes Partners: Male   Lifestyle    Physical activity     Days per week: None     Minutes per session: None    Stress: None   Relationships    Social connections     Talks on phone: None     Gets together: None     Attends [employer]ist service: None     Active member of club or organization: None     Attends meetings of clubs or organizations: None     Relationship status: None    Intimate partner violence     Fear of current or ex partner: None     Emotionally abused: None     Physically abused: None     Forced sexual activity: None   Other Topics Concern    None   Social History Narrative    None        Objective:  Exam:  There were no vitals taken for this visit. This is a well-nourished patient in no acute distress  Patient is awake, alert and oriented x3. Speech is normal.  Pupils are equal round reacting to light. Extraocular movements intact. Face symmetrical. Tongue midline. Motor exam shows normal symmetrical strength. Deep tendon reflexes decreased in the lower extremities. Plantar reflexes downgoing. Sensory exam shows decreased light touch in the distal lower extremities Coordination normal. Gait normal. No carotid bruit. No neck stiffness.         Data :  LABS:  General Labs:    CBC:   Lab Results   Component Value Date    WBC 5.3 11/20/2020    RBC 4.84 11/20/2020    HGB 13.9 11/20/2020    HCT 41.5 11/20/2020    MCV 85.9 11/20/2020    MCH 28.7 11/20/2020    MCHC 33.5 11/20/2020    RDW 12.8 11/20/2020     11/20/2020    MPV 9.7 11/20/2020     BMP:    Lab Results   Component Value Date     11/20/2020    K 3.8 11/20/2020     11/20/2020    CO2 26 11/20/2020    BUN 25 11/20/2020    LABALBU 4.8 11/20/2020    CREATININE 1.1 11/20/2020    CALCIUM 9.2 11/20/2020    GFRAA >60 11/20/2020    GFRAA >60 04/02/2013    LABGLOM 52 11/20/2020    GLUCOSE 103 11/20/2020    GLUCOSE 192 08/19/2015     RADIOLOGY REVIEW:  MRI brain    Impression :  Migraine headaches reasonably well controlled  Previous

## 2020-12-12 ENCOUNTER — HOSPITAL ENCOUNTER (OUTPATIENT)
Age: 55
Discharge: HOME | End: 2020-12-12
Payer: COMMERCIAL

## 2020-12-12 LAB
A/G RATIO: 1.4 (ref 1.1–2.2)
ALBUMIN SERPL-MCNC: 4.3 G/DL (ref 3.4–5)
ALP BLD-CCNC: 74 U/L (ref 40–129)
ALT SERPL-CCNC: 15 U/L (ref 10–40)
ANION GAP SERPL CALCULATED.3IONS-SCNC: 13 MMOL/L (ref 3–16)
AST SERPL-CCNC: 20 U/L (ref 15–37)
BILIRUB SERPL-MCNC: 0.3 MG/DL (ref 0–1)
BUN BLDV-MCNC: 33 MG/DL (ref 7–20)
CALCIUM SERPL-MCNC: 9.6 MG/DL (ref 8.3–10.6)
CHLORIDE BLD-SCNC: 103 MMOL/L (ref 99–110)
CO2: 26 MMOL/L (ref 21–32)
CREAT SERPL-MCNC: 1.3 MG/DL (ref 0.6–1.1)
CREATININE URINE: 204.9 MG/DL (ref 28–259)
FOLATE: >20 NG/ML (ref 4.78–24.2)
GFR AFRICAN AMERICAN: 52
GFR NON-AFRICAN AMERICAN: 43
GLOBULIN: 3 G/DL
GLUCOSE BLD-MCNC: 120 MG/DL (ref 70–99)
HCT VFR BLD CALC: 39.2 % (ref 36–48)
HEMOGLOBIN: 13.2 G/DL (ref 12–16)
MCH RBC QN AUTO: 29.1 PG (ref 26–34)
MCHC RBC AUTO-ENTMCNC: 33.6 G/DL (ref 31–36)
MCV RBC AUTO: 86.7 FL (ref 80–100)
PARATHYROID HORMONE INTACT: 51.8 PG/ML (ref 14–72)
PDW BLD-RTO: 12.9 % (ref 12.4–15.4)
PLATELET # BLD: 244 K/UL (ref 135–450)
PMV BLD AUTO: 10.1 FL (ref 5–10.5)
POTASSIUM SERPL-SCNC: 3.8 MMOL/L (ref 3.5–5.1)
PROTEIN PROTEIN: 22 MG/DL
RBC # BLD: 4.53 M/UL (ref 4–5.2)
SODIUM BLD-SCNC: 142 MMOL/L (ref 136–145)
TOTAL PROTEIN: 7.3 G/DL (ref 6.4–8.2)
VITAMIN B-12: 532 PG/ML (ref 211–911)
VITAMIN D 25-HYDROXY: 79.1 NG/ML
WBC # BLD: 5.4 K/UL (ref 4–11)

## 2020-12-12 PROCEDURE — 84156 ASSAY OF PROTEIN URINE: CPT

## 2020-12-12 PROCEDURE — 82607 VITAMIN B-12: CPT

## 2020-12-12 PROCEDURE — 82746 ASSAY OF FOLIC ACID SERUM: CPT

## 2020-12-12 PROCEDURE — 83970 ASSAY OF PARATHORMONE: CPT

## 2020-12-12 PROCEDURE — 80053 COMPREHEN METABOLIC PANEL: CPT

## 2020-12-12 PROCEDURE — 36415 COLL VENOUS BLD VENIPUNCTURE: CPT

## 2020-12-12 PROCEDURE — 85027 COMPLETE CBC AUTOMATED: CPT

## 2020-12-12 PROCEDURE — 82570 ASSAY OF URINE CREATININE: CPT

## 2020-12-12 PROCEDURE — 82306 VITAMIN D 25 HYDROXY: CPT

## 2020-12-15 RX ORDER — CARVEDILOL 6.25 MG/1
TABLET ORAL
Qty: 60 TABLET | Refills: 0 | Status: SHIPPED | OUTPATIENT
Start: 2020-12-15 | End: 2021-07-08

## 2020-12-15 NOTE — TELEPHONE ENCOUNTER
Received refill request for carvedilol from [address] [place] [address].     Last ov:2020    Last EK2017    Last Refill:#60 with 10 refills on 2019    Next appointment:2021

## 2020-12-28 ENCOUNTER — TELEPHONE (OUTPATIENT)
Dept: NEUROLOGY | Age: 55
End: 2020-12-28

## 2020-12-28 RX ORDER — TOPIRAMATE 50 MG/1
50 TABLET, FILM COATED ORAL 2 TIMES DAILY
Qty: 60 TABLET | Refills: 5 | Status: SHIPPED | OUTPATIENT
Start: 2020-12-28 | End: 2021-05-18 | Stop reason: SDUPTHER

## 2020-12-28 NOTE — TELEPHONE ENCOUNTER
Pt phoned her prescription for Topamax is not correct.   It needs to be Topamax 50 mg bid with refills sent to [facility] in [address] she has enough refills just needs to be clarified as 50 mg bid

## 2021-01-08 ENCOUNTER — TELEPHONE (OUTPATIENT)
Dept: CARDIOLOGY CLINIC | Age: 56
End: 2021-01-08

## 2021-01-08 NOTE — TELEPHONE ENCOUNTER
Unfortunately I don't think she meets the criteria for a handicapped parking placard from a heart perspective any more. Also, I want her to be getting more exercise. Let's see if she can get by without the handicapped placard.   MADELINE

## 2021-01-18 ENCOUNTER — TELEPHONE (OUTPATIENT)
Dept: CARDIOLOGY CLINIC | Age: 56
End: 2021-01-18

## 2021-01-18 DIAGNOSIS — R06.02 SOB (SHORTNESS OF BREATH): Chronic | ICD-10-CM

## 2021-01-18 DIAGNOSIS — I10 ESSENTIAL HYPERTENSION: ICD-10-CM

## 2021-01-18 DIAGNOSIS — I50.32 DIASTOLIC CHF, CHRONIC (HCC): Chronic | ICD-10-CM

## 2021-01-18 DIAGNOSIS — E78.00 PURE HYPERCHOLESTEROLEMIA: Chronic | ICD-10-CM

## 2021-01-18 RX ORDER — SPIRONOLACTONE 25 MG/1
TABLET ORAL
Qty: 90 TABLET | Refills: 0 | Status: SHIPPED | OUTPATIENT
Start: 2021-01-18 | End: 2021-04-19 | Stop reason: SDUPTHER

## 2021-01-18 NOTE — TELEPHONE ENCOUNTER
Med Refill   spironolactone (ALDACTONE) 25 MG tablet   25 mg  90 tablet  1 tablet by mouth daily Decreased by Nephology 12/2018 to 25mg daily.     Delano Manley [address] [facility] [phone]    [address]   Phone:  [phone]  Fax:  [phone]

## 2021-02-08 RX ORDER — POTASSIUM CHLORIDE 750 MG/1
20 TABLET, EXTENDED RELEASE ORAL DAILY
Qty: 180 TABLET | Refills: 1 | Status: SHIPPED | OUTPATIENT
Start: 2021-02-08 | End: 2021-08-06

## 2021-02-08 NOTE — TELEPHONE ENCOUNTER
Medication Refill    Medication needing refilled: potassium chloride    Dosage of the medication: 10 meq    How are you taking this medication (QD, BID, TID, QID, PRN):  bid    30 or 90 day supply called in: 90 day supply    When will you run out of your medication:    Which Pharmacy are we sending the medication to?: [facility] [address], [place] - [address] [phone]    [place] [address] [facility] [place] [address]   Phone:  [phone]  Fax:  [phone]

## 2021-02-08 NOTE — TELEPHONE ENCOUNTER
Last OV 11/30/20 MADELINE  Last Labs 12/12/20    Spoke to the pt-stated she has been taking this medication-not sure why it was not on the medication list.

## 2021-03-18 ENCOUNTER — APPOINTMENT (OUTPATIENT)
Dept: GENERAL RADIOLOGY | Age: 56
End: 2021-03-18
Payer: COMMERCIAL

## 2021-03-18 ENCOUNTER — HOSPITAL ENCOUNTER (EMERGENCY)
Age: 56
Discharge: HOME | End: 2021-03-18
Payer: COMMERCIAL

## 2021-03-18 VITALS
SYSTOLIC BLOOD PRESSURE: 112 MMHG | DIASTOLIC BLOOD PRESSURE: 71 MMHG | TEMPERATURE: 98.1 F | HEIGHT: 65 IN | WEIGHT: 165 LBS | OXYGEN SATURATION: 98 % | HEART RATE: 80 BPM | RESPIRATION RATE: 16 BRPM | BODY MASS INDEX: 27.49 KG/M2

## 2021-03-18 DIAGNOSIS — R07.9 CHEST PAIN, UNSPECIFIED TYPE: Primary | ICD-10-CM

## 2021-03-18 DIAGNOSIS — T50.Z95A ADVERSE EFFECT OF VACCINE, INITIAL ENCOUNTER: ICD-10-CM

## 2021-03-18 LAB
A/G RATIO: 1.6 (ref 1.1–2.2)
ALBUMIN SERPL-MCNC: 4.9 G/DL (ref 3.4–5)
ALP BLD-CCNC: 81 U/L (ref 40–129)
ALT SERPL-CCNC: 15 U/L (ref 10–40)
ANION GAP SERPL CALCULATED.3IONS-SCNC: 10 MMOL/L (ref 3–16)
APTT: 32.8 SEC (ref 24.2–36.2)
AST SERPL-CCNC: 26 U/L (ref 15–37)
BASOPHILS ABSOLUTE: 0 K/UL (ref 0–0.2)
BASOPHILS RELATIVE PERCENT: 0.3 %
BILIRUB SERPL-MCNC: 0.3 MG/DL (ref 0–1)
BUN BLDV-MCNC: 24 MG/DL (ref 7–20)
CALCIUM SERPL-MCNC: 10 MG/DL (ref 8.3–10.6)
CHLORIDE BLD-SCNC: 101 MMOL/L (ref 99–110)
CO2: 26 MMOL/L (ref 21–32)
CREAT SERPL-MCNC: 1 MG/DL (ref 0.6–1.1)
D DIMER: 203 NG/ML DDU (ref 0–229)
EOSINOPHILS ABSOLUTE: 0.1 K/UL (ref 0–0.6)
EOSINOPHILS RELATIVE PERCENT: 1.5 %
GFR AFRICAN AMERICAN: >60
GFR NON-AFRICAN AMERICAN: 58
GLOBULIN: 3 G/DL
GLUCOSE BLD-MCNC: 120 MG/DL (ref 70–99)
HCT VFR BLD CALC: 38.1 % (ref 36–48)
HEMOGLOBIN: 12.8 G/DL (ref 12–16)
INR BLD: 0.98 (ref 0.86–1.14)
LYMPHOCYTES ABSOLUTE: 0.9 K/UL (ref 1–5.1)
LYMPHOCYTES RELATIVE PERCENT: 15.6 %
MCH RBC QN AUTO: 28.9 PG (ref 26–34)
MCHC RBC AUTO-ENTMCNC: 33.7 G/DL (ref 31–36)
MCV RBC AUTO: 85.9 FL (ref 80–100)
MONOCYTES ABSOLUTE: 0.6 K/UL (ref 0–1.3)
MONOCYTES RELATIVE PERCENT: 9.8 %
NEUTROPHILS ABSOLUTE: 4.1 K/UL (ref 1.7–7.7)
NEUTROPHILS RELATIVE PERCENT: 72.8 %
PDW BLD-RTO: 12.2 % (ref 12.4–15.4)
PLATELET # BLD: 233 K/UL (ref 135–450)
PMV BLD AUTO: 9.1 FL (ref 5–10.5)
POTASSIUM REFLEX MAGNESIUM: 3.9 MMOL/L (ref 3.5–5.1)
PROTHROMBIN TIME: 11.4 SEC (ref 10–13.2)
RBC # BLD: 4.43 M/UL (ref 4–5.2)
SODIUM BLD-SCNC: 137 MMOL/L (ref 136–145)
TOTAL PROTEIN: 7.9 G/DL (ref 6.4–8.2)
TROPONIN: <0.01 NG/ML
WBC # BLD: 5.7 K/UL (ref 4–11)

## 2021-03-18 PROCEDURE — 80053 COMPREHEN METABOLIC PANEL: CPT

## 2021-03-18 PROCEDURE — 84484 ASSAY OF TROPONIN QUANT: CPT

## 2021-03-18 PROCEDURE — 85730 THROMBOPLASTIN TIME PARTIAL: CPT

## 2021-03-18 PROCEDURE — 85610 PROTHROMBIN TIME: CPT

## 2021-03-18 PROCEDURE — 71045 X-RAY EXAM CHEST 1 VIEW: CPT

## 2021-03-18 PROCEDURE — 93005 ELECTROCARDIOGRAM TRACING: CPT | Performed by: EMERGENCY MEDICINE

## 2021-03-18 PROCEDURE — 85379 FIBRIN DEGRADATION QUANT: CPT

## 2021-03-18 PROCEDURE — 85025 COMPLETE CBC W/AUTO DIFF WBC: CPT

## 2021-03-18 PROCEDURE — 99283 EMERGENCY DEPT VISIT LOW MDM: CPT

## 2021-03-18 RX ORDER — KETOROLAC TROMETHAMINE 30 MG/ML
30 INJECTION, SOLUTION INTRAMUSCULAR; INTRAVENOUS ONCE
Status: DISCONTINUED | OUTPATIENT
Start: 2021-03-18 | End: 2021-03-18

## 2021-03-18 ASSESSMENT — PAIN SCALES - GENERAL: PAINLEVEL_OUTOF10: 7

## 2021-03-18 NOTE — ED PROVIDER NOTES
This is an independent LUANA patient encounter. I am available for consultation if needed. I did not perform a face-to-face evaluation of this patient and was not asked to see the patient. I was not made aware of any details of the patient's H&P or medical decision making but was asked to review and document this EKG. See my interpretation of the EKG below. I shared my findings and interpretation with the LUANA for use in his/her independent management of this patient. See his/her note for details of the patient's history, physical, and all medical decision making.       The 12 lead EKG was interpreted by me as follows:  Rate: normal with a rate of 75  Rhythm: sinus  Intervals: normal ME, narrow QRS, normal QTc  ST segments: no ST elevations or depressions  T waves: no abnormal inversions  Non-specific T wave changes: present  Prior EKG comparison: EKG dated 2/11/16 is not significantly different          Di Wilson MD  03/18/21 1948

## 2021-03-18 NOTE — ED NOTES
Bed: 20  Expected date: 3/18/21  Expected time:   Means of arrival:   Comments:  [address] Eduardo Grijalva RN  03/18/21 [id number]

## 2021-03-18 NOTE — ED PROVIDER NOTES
[facility]        Pt Name: Sophia Salinas  MRN: [id number]  [place] 1965  Date of evaluation: 3/18/2021  Provider: HELEN Rogers - HILARIO  PCP: Moses CRAFT. I have evaluated this patient. My supervising physician was available for consultation. CHIEF COMPLAINT       Chief Complaint   Patient presents with    Chest Pain     pt with chest discomfort started today- had COVID shot yesterday- pain with inspiration- discomfort is mid sternal.        HISTORY OF PRESENT ILLNESS   (Location, Timing/Onset, Context/Setting, Quality, Duration, Modifying Factors, Severity, Associated Signs and Symptoms)  Note limiting factors. Sophia Salinas is a 48 y.o. female who presents to the emergency department today reporting right-sided chest pain which began today. Patient states that she received her first Covid vaccine yesterday. She states that she has never had discomfort like this in the past.  Patient currently reports a pain level of 4 out of 10. She states that pain is worse with deep inspiration. Pain is sharp and mostly right-sided. She denies having any shortness of breath. There is no history of coronary artery disease however she does have history of CHF due to uncontrolled hypertension. She denies history of DVTs or PEs. She denies lower extremity pain or swelling. There has been no recent mobilization or surgeries. She denies cough or hemoptysis. Patient has not taken anything for symptoms. She denies fever, chills, or other symptoms. Pain is nonmigratory, there is no history of Marfan's. Nursing Notes were all reviewed and agreed with or any disagreements were addressed in the HPI. REVIEW OF SYSTEMS    (2-9 systems for level 4, 10 or more for level 5)     Review of Systems   Constitutional: Negative for chills and fever. HENT: Negative for congestion and sore throat.     Eyes: Negative for visual disturbance. Respiratory: Negative for cough, chest tightness, shortness of breath and wheezing. Cardiovascular: Positive for chest pain. Negative for palpitations and leg swelling. Gastrointestinal: Negative for abdominal pain, nausea and vomiting. Endocrine: Negative for polydipsia and polyuria. Genitourinary: Negative for difficulty urinating and dysuria. Musculoskeletal: Negative for back pain, joint swelling and neck stiffness. Skin: Negative for rash and wound. Allergic/Immunologic: Negative for immunocompromised state. Neurological: Negative for dizziness, weakness and light-headedness. Hematological: Does not bruise/bleed easily. Psychiatric/Behavioral: Negative for suicidal ideas. Positives and Pertinent negatives as per HPI. Except as noted above in the ROS, all other systems were reviewed and negative.        PAST MEDICAL HISTORY     Past Medical History:   Diagnosis Date    Abdominal pain 2010    Arthritis     Asthma     Chest pain 2011    CHF (congestive heart failure) ([address]     Diastolic heart failure     5/17/10-heart worsening-Dr. Alcides Iqbal    Dyspnea     Heart failure, diastolic (HCC)     Hemoptysis 2012    Hyperlipidemia     Hypertension     Nausea & vomiting     PONV (postoperative nausea and vomiting)     Pulmonary hypertension ([address]     Pulmonary hypertension due to left ventricular diastolic dysfunction ([address]     Renal failure     Sleep apnea     SUPPOSED TO USE CPAP    Vertigo          SURGICAL HISTORY     Past Surgical History:   Procedure Laterality Date    BREAST CYST EXCISION Right 2017    end     BREAST SURGERY      breast reduction    BRONCHOSCOPY  12    BRONCHOSCOPY  2016    w/ washing     SECTION      CHOLECYSTECTOMY      HERNIA REPAIR      HYSTERECTOMY  2015    LAPAROSCOPIC APPENDECTOMY  6/3/11    LAPAROSCOPY  2011    operative, with lysis of adhesions    NEUROMA Med      LORazepam (ATIVAN) 0.5 MG tablet Historical Med               ALLERGIES     Lisinopril, Statins, Dilaudid [hydromorphone hcl], and Skelaxin [metaxalone]    FAMILYHISTORY       Family History   Problem Relation Age of Onset    Cancer Father         lung    Hypertension Father     High Blood Pressure Father     Hypertension Brother     High Blood Pressure Brother     Hypertension Mother    Porum Bars Other Mother         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/2009    Heart Disease Mother     High Blood Pressure Mother     Cancer Paternal Grandmother     Hypertension Other         aunt    Stroke Maternal Grandfather     Cancer Maternal Aunt         breast    High Blood Pressure Maternal Aunt     Cancer Paternal Aunt         lung    Cancer Paternal Uncle         brain    Heart Disease Maternal Grandmother     High Blood Pressure Maternal Grandmother     Heart Disease Paternal Grandfather     Diabetes Neg Hx     Osteoporosis Neg Hx     Thyroid Disease Neg Hx     Anesth Problems Neg Hx     Malig Hyperten Neg Hx     Hypotension Neg Hx     Malig Hypertherm Neg Hx     Pseudochol. Deficiency Neg Hx           SOCIAL HISTORY       Social History     Tobacco Use    Smoking status: Never Smoker    Smokeless tobacco: Never Used   Substance Use Topics    Alcohol use: Not Currently     Alcohol/week: 0.0 standard drinks     Frequency: Never     Comment: rarely    Drug use: No       SCREENINGS             PHYSICAL EXAM    (up to 7 for level 4, 8 or more for level 5)     ED Triage Vitals [03/18/21 1656]   BP Temp Temp Source Pulse Resp SpO2 Height Weight   112/71 98.1 °F (36.7 °C) Oral 80 16 98 % 5' 5\" (1.651 m) 165 lb (74.8 kg)       Physical Exam  Vitals signs and nursing note reviewed. Constitutional:       Appearance: Normal appearance. HENT:      Head: Normocephalic and atraumatic.       Right Ear: External ear normal.      Left Ear: External ear normal.      Nose: Nose normal.      Mouth/Throat: Mouth: Mucous membranes are moist.   Eyes:      General:         Right eye: No discharge. Left eye: No discharge. Extraocular Movements: Extraocular movements intact. Neck:      Musculoskeletal: Normal range of motion and neck supple. Cardiovascular:      Rate and Rhythm: Normal rate and regular rhythm. Pulses: Normal pulses. Heart sounds: Normal heart sounds. Pulmonary:      Effort: Pulmonary effort is normal. No respiratory distress. Breath sounds: Normal breath sounds. Abdominal:      General: Abdomen is flat. Palpations: Abdomen is soft. Tenderness: There is no abdominal tenderness. Musculoskeletal: Normal range of motion. Skin:     General: Skin is warm and dry. Capillary Refill: Capillary refill takes less than 2 seconds. Neurological:      General: No focal deficit present. Mental Status: She is alert and oriented to person, place, and time.    Psychiatric:         Mood and Affect: Mood normal.         Behavior: Behavior normal.         DIAGNOSTIC RESULTS   LABS:    Labs Reviewed   CBC WITH AUTO DIFFERENTIAL - Abnormal; Notable for the following components:       Result Value    RDW 12.2 (*)     Lymphocytes Absolute 0.9 (*)     All other components within normal limits    Narrative:     Performed at:  [facility] [address], [facility], [address] [employer]   Phone [phone]   COMPREHENSIVE METABOLIC PANEL W/ REFLEX TO MG FOR LOW K - Abnormal; Notable for the following components:    Glucose 120 (*)     BUN 24 (*)     GFR Non- 58 (*)     All other components within normal limits    Narrative:     Performed at:  [facility] [address], [facility], [address] [employer]   Phone [phone]   TROPONIN    Narrative:     Performed at:  [facility] [address], [facility], [address] [employer]   Phone [phone]   PROTIME-INR    Narrative: Performed at:  [facility]  [address] [facility], [address]   Phone [phone]   APTT    Narrative:     Performed at:  [facility]  [address] [facility], [address]   Phone [phone]   D-DIMER, QUANTITATIVE    Narrative:     Performed at:  [facility]  [address] [facility], [address] [facility]   Phone [phone]       All other labs were within normal range or not returned as of this dictation. EKG: All EKG's are interpreted by the Emergency Department Physician in the absence of a cardiologist.  Please see their note for interpretation of EKG. RADIOLOGY:   Non-plain film images such as CT, Ultrasound and MRI are read by the radiologist. Plain radiographic images are visualized and preliminarily interpreted by the ED Provider with the below findings:        Interpretation per the Radiologist below, if available at the time of this note:    XR CHEST PORTABLE   Final Result   Clear lungs. No acute cardiopulmonary abnormality. Xr Chest Portable    Result Date: 3/18/2021  EXAMINATION: ONE XRAY VIEW OF THE CHEST 3/18/2021 4:23 pm COMPARISON: April 14, 2016. HISTORY: ORDERING SYSTEM PROVIDED HISTORY: SOB TECHNOLOGIST PROVIDED HISTORY: Reason for exam:->SOB Reason for Exam: pt with chest discomfort started today- had COVID shot yesterday- pain with inspiration- discomfort is mid sternal Acuity: Acute Type of Exam: Subsequent/Follow-up FINDINGS: A single upright frontal view chest radiograph was obtained. The heart size, mediastinal contour, and pleural spaces are within normal limits. The lungs are clear. There is no focal consolidation or pneumothorax. The pulmonary vascular pattern is within normal limits. No significant thoracic osseous abnormality. Clear lungs. No acute cardiopulmonary abnormality.            PROCEDURES   Unless otherwise noted below, none     Procedures    CRITICAL 03/18/2021 06:41:46 PM      PATIENT REFERREDTO:  Laura Abdalla  [address] [address]  [phone]    In 2 days        DISCHARGE MEDICATIONS:  Discharge Medication List as of 3/18/2021  6:58 PM          DISCONTINUED MEDICATIONS:  Discharge Medication List as of 3/18/2021  6:58 PM                 (Please note that portions of this note were completed with a voice recognition program.  Efforts were made to edit the dictations but occasionally words are mis-transcribed.)    HELEN Alvares CNP (electronically signed)            HELEN Alvares CNP  03/19/21 0932

## 2021-03-18 NOTE — ED NOTES
Pt discharged, verbalized understanding. Ambulated independently to exit.  No s/s of distress noted     Jaydon Cooper RN  03/18/21 9146

## 2021-03-19 LAB
EKG ATRIAL RATE: 75 BPM
EKG DIAGNOSIS: NORMAL
EKG P AXIS: 29 DEGREES
EKG P-R INTERVAL: 142 MS
EKG Q-T INTERVAL: 392 MS
EKG QRS DURATION: 98 MS
EKG QTC CALCULATION (BAZETT): 437 MS
EKG R AXIS: 142 DEGREES
EKG T AXIS: -2 DEGREES
EKG VENTRICULAR RATE: 75 BPM

## 2021-03-19 PROCEDURE — 93010 ELECTROCARDIOGRAM REPORT: CPT | Performed by: INTERNAL MEDICINE

## 2021-03-19 ASSESSMENT — ENCOUNTER SYMPTOMS
WHEEZING: 0
SORE THROAT: 0
COUGH: 0
CHEST TIGHTNESS: 0
SHORTNESS OF BREATH: 0
NAUSEA: 0
VOMITING: 0
ABDOMINAL PAIN: 0
BACK PAIN: 0

## 2021-04-18 DIAGNOSIS — I50.32 DIASTOLIC CHF, CHRONIC (HCC): Chronic | ICD-10-CM

## 2021-04-18 DIAGNOSIS — E78.00 PURE HYPERCHOLESTEROLEMIA: Chronic | ICD-10-CM

## 2021-04-18 DIAGNOSIS — I10 ESSENTIAL HYPERTENSION: ICD-10-CM

## 2021-04-18 DIAGNOSIS — R06.02 SOB (SHORTNESS OF BREATH): Chronic | ICD-10-CM

## 2021-04-19 DIAGNOSIS — I50.32 DIASTOLIC CHF, CHRONIC (HCC): Chronic | ICD-10-CM

## 2021-04-19 DIAGNOSIS — E78.00 PURE HYPERCHOLESTEROLEMIA: Chronic | ICD-10-CM

## 2021-04-19 DIAGNOSIS — I10 ESSENTIAL HYPERTENSION: ICD-10-CM

## 2021-04-19 DIAGNOSIS — R06.02 SOB (SHORTNESS OF BREATH): Chronic | ICD-10-CM

## 2021-04-19 RX ORDER — SPIRONOLACTONE 25 MG/1
TABLET ORAL
Qty: 90 TABLET | Refills: 3 | Status: SHIPPED | OUTPATIENT
Start: 2021-04-19 | End: 2022-04-11

## 2021-04-19 NOTE — TELEPHONE ENCOUNTER
Medication Refill    Medication needing refilled: spironolactone (ALDACTONE) 25 MG tablet     Dosage of the medication: 25 mg    How are you taking this medication (QD, BID, TID, QID, PRN): 1 tab daily    30 or 90 day supply called in: 90    When will you run out of your medication: Pt is out of medications     Which Pharmacy are we sending the medication to?:  [facility] [address], [place] - [address] [facility] [phone]    [place] [address]   Phone:  [phone]  Fax:  [phone]

## 2021-04-20 RX ORDER — SPIRONOLACTONE 25 MG/1
TABLET ORAL
Qty: 90 TABLET | Refills: 0 | OUTPATIENT
Start: 2021-04-20

## 2021-05-18 ENCOUNTER — OFFICE VISIT (OUTPATIENT)
Dept: NEUROLOGY | Age: 56
End: 2021-05-18
Payer: COMMERCIAL

## 2021-05-18 VITALS
HEART RATE: 74 BPM | DIASTOLIC BLOOD PRESSURE: 77 MMHG | BODY MASS INDEX: 27.32 KG/M2 | WEIGHT: 164 LBS | SYSTOLIC BLOOD PRESSURE: 117 MMHG | HEIGHT: 65 IN

## 2021-05-18 DIAGNOSIS — E11.42 DIABETIC POLYNEUROPATHY ASSOCIATED WITH TYPE 2 DIABETES MELLITUS (HCC): ICD-10-CM

## 2021-05-18 DIAGNOSIS — R25.1 TREMOR: ICD-10-CM

## 2021-05-18 DIAGNOSIS — R26.0 TITUBATION: ICD-10-CM

## 2021-05-18 PROCEDURE — 99214 OFFICE O/P EST MOD 30 MIN: CPT | Performed by: PSYCHIATRY & NEUROLOGY

## 2021-05-18 RX ORDER — TOPIRAMATE 50 MG/1
50 TABLET, FILM COATED ORAL 2 TIMES DAILY
Qty: 60 TABLET | Refills: 5 | Status: SHIPPED | OUTPATIENT
Start: 2021-05-18 | End: 2021-12-20 | Stop reason: SDUPTHER

## 2021-05-18 NOTE — PROGRESS NOTES
Marco Antonio Goudl   Neurology followup    Subjective:   CC/HP  History was obtained from the patient. Interval history:  Patient states that the headaches are reasonably well controlled. She gets occasional stress headache. No major side effects of topiramate. She has been following a strict diet and avoiding chocolate and nitrate-containing foods. Detailed history:  Patient has chronic migraine headaches. No significant side effects of medication  Headaches are usually described as mainly in the left parietal occipital region and are throbbing. Bright light and loud noise used to make the headaches worse.       REVIEW OF SYSTEMS    Constitutional:  []   Chills   []  Fatigue   []  Fevers   []  Malaise   []  Weight loss     [x] Denies all of the above    Respiratory:   []  Cough    [x]  Shortness of breath         [] Denies all of the above     Cardiovascular:   []  Chest pain    []  Exertional chest pressure/discomfort           [] Palpitations    []  Syncope     [x] Denies all of the above        Past Medical History:   Diagnosis Date    Abdominal pain 9/9/2010    Arthritis     Asthma     Chest pain 6/22/2011    [facility]cong[facility]) ([facility]     Diastolic heart failure     5/17/10-heart worsening-Dr. Rocio Hoang    Dyspnea     Heart failure, diastolic (HCC)     Hemoptysis 5/14/2012    Hyperlipidemia     Hypertension     Nausea & vomiting     PONV (postoperative nausea and vomiting)     Pulmonary hypertension ([address]     Pulmonary hypertension due to left ventricular diastolic dysfunction (HCC)     Renal failure     Sleep apnea     SUPPOSED TO USE CPAP    Vertigo      Family History   Problem Relation Age of Onset    Cancer Father         lung    Hypertension Father     High Blood Pressure Father     Hypertension Brother     High Blood Pressure Brother     Hypertension Mother    Armida Brunner Other Mother         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/2009    Heart Disease Mother     High Blood Pressure Mother     Cancer Paternal Grandmother     Hypertension Other         aunt    Stroke Maternal Grandfather     Cancer Maternal Aunt         breast    High Blood Pressure Maternal Aunt     Cancer Paternal Aunt         lung    Cancer Paternal Uncle         brain    Heart Disease Maternal Grandmother     High Blood Pressure Maternal Grandmother     Heart Disease Paternal Grandfather     Diabetes Neg Hx     Osteoporosis Neg Hx     Thyroid Disease Neg Hx     Anesth Problems Neg Hx     Malig Hyperten Neg Hx     Hypotension Neg Hx     Malig Hypertherm Neg Hx     Pseudochol. Deficiency Neg Hx      Social History     Socioeconomic History    Marital status:      Spouse name: Not on file    Number of children: Not on file    Years of education: Not on file    Highest education level: Not on file   Occupational History    Not on file   Tobacco Use    Smoking status: Never Smoker    Smokeless tobacco: Never Used   Vaping Use    Vaping Use: Never used   Substance and Sexual Activity    Alcohol use: Not Currently     Alcohol/week: 0.0 standard drinks     Comment: rarely    Drug use: No    Sexual activity: Yes     Partners: Male   Other Topics Concern    Not on file   Social History Narrative    Not on file     Social Determinants of Health     Financial Resource Strain:     Difficulty of Paying Living Expenses:    Food Insecurity:     Worried About Running Out of Food in the Last Year:     [address] in the Last Year:    Transportation Needs:     Lack of Transportation (Medical):      Lack of Transportation (Non-Medical):    Physical Activity:     Days of Exercise per Week:     Minutes of Exercise per Session:    Stress:     Feeling of Stress :    Social Connections:     Frequency of Communication with Friends and Family:     Frequency of Social Gatherings with Friends and Family:     Attends [employer] Services:     Active Member of Clubs or Organizations:     Attends were discussed. Strict control of diabetes was discussed  Since her tremors are mild we decided not to do any medication for the tremors  Continue with good control of diabetes. The last A1c was below 6        Please note a portion of  this chart was generated using dragon dictation software. Although every effort was made to ensure the accuracy of this automated transcription, some errors in transcription may have occurred.

## 2021-06-04 ENCOUNTER — HOSPITAL ENCOUNTER (OUTPATIENT)
Age: 56
Discharge: HOME | End: 2021-06-04
Payer: COMMERCIAL

## 2021-06-04 DIAGNOSIS — I50.32 DIASTOLIC CHF, CHRONIC (HCC): ICD-10-CM

## 2021-06-04 DIAGNOSIS — E78.00 PURE HYPERCHOLESTEROLEMIA: ICD-10-CM

## 2021-06-04 DIAGNOSIS — R06.02 SOB (SHORTNESS OF BREATH): ICD-10-CM

## 2021-06-04 DIAGNOSIS — I10 ESSENTIAL HYPERTENSION: ICD-10-CM

## 2021-06-04 LAB
A/G RATIO: 1.5 (ref 1.1–2.2)
ALBUMIN SERPL-MCNC: 4.5 G/DL (ref 3.4–5)
ALP BLD-CCNC: 77 U/L (ref 40–129)
ALT SERPL-CCNC: 14 U/L (ref 10–40)
ANION GAP SERPL CALCULATED.3IONS-SCNC: 10 MMOL/L (ref 3–16)
AST SERPL-CCNC: 23 U/L (ref 15–37)
BILIRUB SERPL-MCNC: 0.3 MG/DL (ref 0–1)
BUN BLDV-MCNC: 22 MG/DL (ref 7–20)
CALCIUM SERPL-MCNC: 9.7 MG/DL (ref 8.3–10.6)
CHLORIDE BLD-SCNC: 99 MMOL/L (ref 99–110)
CHOLESTEROL, TOTAL: 167 MG/DL (ref 0–199)
CO2: 26 MMOL/L (ref 21–32)
CREAT SERPL-MCNC: 1.1 MG/DL (ref 0.6–1.1)
GFR AFRICAN AMERICAN: >60
GFR NON-AFRICAN AMERICAN: 52
GLOBULIN: 3 G/DL
GLUCOSE BLD-MCNC: 100 MG/DL (ref 70–99)
HCT VFR BLD CALC: 36.2 % (ref 36–48)
HDLC SERPL-MCNC: 43 MG/DL (ref 40–60)
HEMOGLOBIN: 12.5 G/DL (ref 12–16)
LDL CHOLESTEROL CALCULATED: 105 MG/DL
MCH RBC QN AUTO: 29.9 PG (ref 26–34)
MCHC RBC AUTO-ENTMCNC: 34.6 G/DL (ref 31–36)
MCV RBC AUTO: 86.4 FL (ref 80–100)
PDW BLD-RTO: 12.3 % (ref 12.4–15.4)
PLATELET # BLD: 250 K/UL (ref 135–450)
PMV BLD AUTO: 9.5 FL (ref 5–10.5)
POTASSIUM SERPL-SCNC: 3.9 MMOL/L (ref 3.5–5.1)
PRO-BNP: 29 PG/ML (ref 0–124)
RBC # BLD: 4.19 M/UL (ref 4–5.2)
SODIUM BLD-SCNC: 135 MMOL/L (ref 136–145)
TOTAL PROTEIN: 7.5 G/DL (ref 6.4–8.2)
TRIGL SERPL-MCNC: 96 MG/DL (ref 0–150)
VLDLC SERPL CALC-MCNC: 19 MG/DL
WBC # BLD: 4.5 K/UL (ref 4–11)

## 2021-06-04 PROCEDURE — 80061 LIPID PANEL: CPT

## 2021-06-04 PROCEDURE — 36415 COLL VENOUS BLD VENIPUNCTURE: CPT

## 2021-06-04 PROCEDURE — 85027 COMPLETE CBC AUTOMATED: CPT

## 2021-06-04 PROCEDURE — 83880 ASSAY OF NATRIURETIC PEPTIDE: CPT

## 2021-06-04 PROCEDURE — 80053 COMPREHEN METABOLIC PANEL: CPT

## 2021-06-10 ENCOUNTER — HOSPITAL ENCOUNTER (OUTPATIENT)
Dept: WOMENS IMAGING | Age: 56
Discharge: HOME | End: 2021-06-10
Payer: COMMERCIAL

## 2021-06-10 VITALS — BODY MASS INDEX: 26.33 KG/M2 | WEIGHT: 158 LBS | HEIGHT: 65 IN

## 2021-06-10 DIAGNOSIS — Z12.31 BREAST CANCER SCREENING BY MAMMOGRAM: ICD-10-CM

## 2021-06-10 PROCEDURE — 77067 SCR MAMMO BI INCL CAD: CPT

## 2021-06-16 NOTE — PROGRESS NOTES
[address]   Advanced Heart Failure/Pulmonary Hypertension  Cardiac Follow up       Jaclyn Leggett  YOB: 1965    Date of Visit:  6/16/21    No chief complaint on file. History of Present Illness:  Jaclyn Leggett is a 48 y.o. female with past medical history significant for dCHF, PHTN, NIRALI, HTN, and CKD. Valsartan was discontinued in May 2018 secondary symptomatic hypotension with it's use (97/52). She sees Dr. Ghazal Stafford neurology for headaches. 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 Arnold Hernandez at [facility]. 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 She has been seeing Dr. Ghazal Stafford for headches and is taking Topiramate 50mg  one in the AM and two tabs at HS; (history of congenital migraines -- may need brain surgery to insert fat). He wanted to prescribe Verapamil for headaches, but her BP runs low, and she takes Midodrine. She was seen in [facility] ER 3/18/2021 with chest discomfort, was s/p Covid vaccine x 24hrs. Work up negative for acute disorders, and her pain self-resolved while there. She was discharged home in stable condition.     Today, she    NYHA Class II    Allergies   Allergen Reactions    Lisinopril Other (See Comments)     cough    Statins Other (See Comments)     Light-headedness, cp, fast HR    Dilaudid [Hydromorphone Hcl] Nausea And Vomiting    Skelaxin [Metaxalone] Palpitations     Heart beats fast     Current Outpatient Medications   Medication Sig Dispense Refill    topiramate (TOPAMAX) 50 MG tablet Take 1 tablet by mouth 2 times daily 60 tablet 5    spironolactone (ALDACTONE) 25 MG tablet TAKE ONE TABLET BY MOUTH DAILY 90 tablet 3    potassium chloride (KLOR-CON M) 10 MEQ extended release tablet Take 2 tablets by mouth daily 180 tablet 1    carvedilol (COREG) 6.25 MG tablet TAKE ONE TABLET BY MOUTH TWICE A DAY WITH MEALS 60 tablet 0    albuterol sulfate  (90 Base) MCG/ACT inhaler Inhale 2 puffs into the lungs every 6 hours as needed      estradiol (ESTRACE) 0.5 MG tablet TAKE ONE TABLET BY MOUTH DAILY      fenofibrate (TRICOR) 145 MG tablet Take 1 tablet by mouth      LORazepam (ATIVAN) 0.5 MG tablet       Multiple Vitamins-Minerals (MULTIVITAMIN ADULTS) TABS MULTIVITAMIN ADULTS TABS      furosemide (LASIX) 40 MG tablet TAKE ONE TABLET BY MOUTH DAILY (Patient taking differently: Take 20 mg by mouth three times a week ) 90 tablet 3    ezetimibe (ZETIA) 10 MG tablet TAKE ONE TABLET BY MOUTH DAILY 90 tablet 3    pravastatin (PRAVACHOL) 10 MG tablet TAKE ONE TABLET BY MOUTH DAILY 90 tablet 3    Cholecalciferol (VITAMIN D3) 50 MCG (2000 UT) CAPS Take 2,000 Units by mouth daily      midodrine (PROAMATINE) 2.5 MG tablet Take 1 tablet by mouth 3 times daily (Patient taking differently: Take 2.5 mg by mouth three times a week On Mon, Wed and Fri.) 90 tablet 1    nortriptyline (PAMELOR) 10 MG capsule Take 10 mg by mouth 2 times daily      Liraglutide (VICTOZA) 18 MG/3ML SOPN SC injection Inject 1.8 mg into the skin daily        No current facility-administered medications for this visit.        Past Medical History:   Diagnosis Date    Abdominal pain 9/9/2010    Arthritis     Asthma     Chest pain 6/22/2011    CHF (congestive heart failure) (HCC)     Diastolic heart failure     5/17/10-heart worsening-Dr. Jay Boswell    Dyspnea     Heart failure, diastolic (HCC)     Hemoptysis 5/14/2012    Hyperlipidemia     Hypertension     Nausea & vomiting     PONV (postoperative nausea and vomiting)     Pulmonary hypertension (HCC)     Pulmonary hypertension due to left ventricular diastolic dysfunction (HCC)     Renal failure     Sleep apnea     SUPPOSED TO USE CPAP    Vertigo      Past Surgical History:   Procedure Laterality Date    BREAST CYST EXCISION Right 03/28/2017    end of march    BREAST REDUCTION SURGERY      BREAST SURGERY      breast reduction    BRONCHOSCOPY  12    BRONCHOSCOPY  2016    w/ washing     SECTION      CHOLECYSTECTOMY      HERNIA REPAIR      HYSTERECTOMY  2015    LAPAROSCOPIC APPENDECTOMY  6/3/11    LAPAROSCOPY  2011    operative, with lysis of adhesions    NEUROMA SURGERY      OTHER SURGICAL HISTORY      3 rt heart caths and 2 left heart caths    SALPINGO-OOPHORECTOMY  6/3/11    Laparoscopic Right    SHOULDER SURGERY      SHOULDER SURGERY Right 2018    UPPER GASTROINTESTINAL ENDOSCOPY  11    biopsy    UPPER GASTROINTESTINAL ENDOSCOPY  2011    EUS     Family History   Problem Relation Age of Onset    Cancer Father         lung    Hypertension Father     High Blood Pressure Father     Hypertension Brother     High Blood Pressure Brother     Hypertension Mother    [employer] Other Mother         abdominal aortic aneursym - passes 2009    Heart Disease Mother     High Blood Pressure Mother     Cancer Paternal Grandmother     Hypertension Other         aunt    Stroke Maternal Grandfather     Cancer Maternal Aunt         breast    High Blood Pressure Maternal Aunt     Cancer Paternal Aunt         lung    Cancer Paternal Uncle         brain    Heart Disease Maternal Grandmother     High Blood Pressure Maternal Grandmother     Heart Disease Paternal Grandfather     Diabetes Neg Hx     Osteoporosis Neg Hx     Thyroid Disease Neg Hx     Anesth Problems Neg Hx     Malig Hyperten Neg Hx     Hypotension Neg Hx     Malig Hypertherm Neg Hx     Pseudochol.  Deficiency Neg Hx      Social History     Socioeconomic History    Marital status:      Spouse name: Not on file    Number of children: Not on file    Years of education: Not on file    Highest education level: Not on file   Occupational History    Not on file   Tobacco Use    Smoking status: Never Smoker    Smokeless tobacco: Never Used   Vaping Use    Vaping Use: Never used   Substance and Sexual Activity    Alcohol use: Not Currently     Alcohol/week: 0.0 standard drinks     Comment: rarely    Drug use: No    Sexual activity: Yes     Partners: Male   Other Topics Concern    Not on file   Social History Narrative    Not on file     Social Determinants of Health     Financial Resource Strain:     Difficulty of Paying Living Expenses:    Food Insecurity:     Worried About Running Out of Food in the Last Year:     [address] in the Last Year:    Transportation Needs:     Lack of Transportation (Medical):  Lack of Transportation (Non-Medical):    Physical Activity:     Days of Exercise per Week:     Minutes of Exercise per Session:    Stress:     Feeling of Stress :    Social Connections:     Frequency of Communication with Friends and Family:     Frequency of Social Gatherings with Friends and Family:     Attends [employer] Services:     Active Member of Clubs or Organizations:     Attends Club or Organization Meetings:     Marital Status:    Intimate Partner Violence:     Fear of Current or Ex-Partner:     Emotionally Abused:     Physically Abused:     Sexually Abused:        Review of Systems:   · Constitutional: there has been no unanticipated weight loss. There's been no change in energy level, sleep pattern, or activity level. · Eyes: No visual changes or diplopia. No scleral icterus. · ENT: No Headaches, hearing loss or vertigo. No mouth sores or sore throat. · Cardiovascular: Reviewed in HPI  · Respiratory: No cough or wheezing, no sputum production. No hematemesis. CHUNG  · Gastrointestinal: No abdominal pain, appetite loss, blood in stools. No change in bowel or bladder habits. · Genitourinary: No dysuria, trouble voiding, or hematuria. · Musculoskeletal:  No gait disturbance, weakness or joint complaints. · Integumentary: No rash or pruritis.   · Neurological: Positive headaches, No diplopia, change in muscle strength, numbness or tingling. No change in gait, balance, coordination, mood, affect, memory, mentation, behavior. · Psychiatric: No anxiety, no depression. · Endocrine: No malaise, fatigue or temperature intolerance. No excessive thirst, fluid intake, or urination. No tremor. · Hematologic/Lymphatic: No abnormal bruising or bleeding, blood clots or swollen lymph nodes. · Allergic/Immunologic: No nasal congestion or hives. Physical Examination:    There were no vitals filed for this visit. There is no height or weight on file to calculate BMI. Wt Readings from Last 3 Encounters:   06/10/21 158 lb (71.7 kg)   05/18/21 164 lb (74.4 kg)   03/18/21 165 lb (74.8 kg)     BP Readings from Last 3 Encounters:   05/18/21 117/77   03/18/21 112/71   12/07/20 110/74      Constitutional and General Appearance:     WD/WN in NAD  HEENT:  NC/AT  Skin:  Warm, dry  Respiratory:  · Normal excursion and expansion without use of accessory muscles  · Resp Auscultation: Normal breath sounds without dullness  Cardiovascular:  · The apical impulses not displaced  · Heart tones are crisp and normal  · Cervical veins are not engorged  · The carotid upstroke is normal in amplitude and contour without delay or bruit  · JVP normal  RRR with nl S1 and S2 without m,r,g.  · Peripheral pulses are symmetrical and full. · There is no clubbing, cyanosis of the extremities. · No edema. · Femoral Arteries: 2+ and equal.  · Pedal Pulses: 2+ and equal.   Neck:  · JVP less than 8 cm H2O  · No thyromegaly  Abdomen:  · No masses or tenderness  · Liver/Spleen: No Abnormalities Noted  Neurological/Psychiatric:  · Alert and oriented in all spheres  · Moves all extremities well  · Exhibits normal gait balance and coordination  · No abnormalities of mood, affect, memory, mentation, or behavior are noted    Diagnostics:    Echo 11/30/2020  Technically limited due to poor acoustical windows.    Normal left ventricle size, wall thickness and systolic function with an estimated ejection fraction of 55-60%. No regional wall motion abnormalities are seen. Indeterminate diastolic function. Unable to estimate pulmonary artery pressure secondary to incomplete TR jet envelope. The right ventricle is normal in size and function. Echo 3/7/19  Summary   -Normal left ventricle size, wall thickness, and systolic function with an estimated ejection fraction of 55-60% visually, 58% by 3D HM and 55% by 3D FV.   -No regional wall motion abnormalities are seen.   -Trivial pulmonic regurgitation present.   -Normal diastolic function. Avg. E/e'=7.75      Echo 8/25/2017   Summary   Normal left ventricle size, wall thickness and systolic function with an   estimated ejection fraction of 55-60%.  Trivial tricuspid regurgitation. Echo 8/26/2016  Summary  Normal left ventricle size, wall thickness and systolic function with an EF of 55%. Reversal of E/A inflow velocities across the mitral valve suggesting impaired left ventricular relaxation.     Cardiac Cath 5/8/14  LM-normal  LAD-normal  Cx- normal  RCA-normal, dominant   LVEF-55%     Hemodynamics: RA mean 4  RV 32/0  PA 32/11 mean 19  PAW 10/9 mean 5  Thermal: C.O. 4.06 and C.I. 2.05  Glenn: C.O. 4.96 and C.I. 2.51      Labs were reviewed including labs from other hospital systems through [facility]. Cardiac testing was reviewed including echos, nuclear scans, cardiac catheterization, including from other hospital systems through [facility]. Assessment:  No diagnosis found. 1. Diastolic CHF, chronic: Stable. Compensated by exam.     -ProBNP 29 (6/4/21)  -Continue Spironolactone, Lasix 2-3xwk, Coreg. Midodrine MWF    -ECHO 11/30/2020>EF 55-60%, indeterminate diastolic function. -ECHO 3/7/2019> EF 42-03%, normal diastolic function. 2. Essential hypertension: Stable  There were no vitals taken for this visit. 3. Pure hypercholesterolemia:  6/4/21> , TG 96, HDL 43,   -Continue Pravastatin & Zetia. 4. SOB (shortness of breath):  SOB with stairs. 5. Orthostatic hypotension, history of: Stable  Reported light headedness and dizziness when SBP < 90, DBP < 60 in the past  Taking Midodrine 2.5mg (3 times a week)  Reports she is staying hydrated. Plan:  1. No med changes  2. Labs  3. RTO in *** months    Time Based Itemization  A total of 30 minutes was spent on today's patient encounter. If applicable, non-patient-facing activities:  ( x)Preparing to see the patient and reviewing records  ( ) Individual interpretation of results  ( ) Discussion or coordination of care with other health care professionals  ( x) Ordering of unique tests, medications, or procedures  ( x) Documentation within the EHR      I appreciate the opportunity of cooperating in the care of this patient.       Katelin Li M.D., [facility]

## 2021-06-18 ENCOUNTER — OFFICE VISIT (OUTPATIENT)
Dept: CARDIOLOGY CLINIC | Age: 56
End: 2021-06-18
Payer: COMMERCIAL

## 2021-06-18 VITALS
OXYGEN SATURATION: 98 % | SYSTOLIC BLOOD PRESSURE: 96 MMHG | WEIGHT: 163.4 LBS | HEART RATE: 70 BPM | BODY MASS INDEX: 27.22 KG/M2 | DIASTOLIC BLOOD PRESSURE: 68 MMHG | HEIGHT: 65 IN

## 2021-06-18 DIAGNOSIS — E78.2 MIXED HYPERLIPIDEMIA: ICD-10-CM

## 2021-06-18 DIAGNOSIS — R06.02 SOB (SHORTNESS OF BREATH): ICD-10-CM

## 2021-06-18 DIAGNOSIS — I50.32 DIASTOLIC CHF, CHRONIC (HCC): Primary | ICD-10-CM

## 2021-06-18 DIAGNOSIS — I10 ESSENTIAL HYPERTENSION: ICD-10-CM

## 2021-06-18 DIAGNOSIS — E78.00 PURE HYPERCHOLESTEROLEMIA: ICD-10-CM

## 2021-06-18 PROCEDURE — 99214 OFFICE O/P EST MOD 30 MIN: CPT | Performed by: INTERNAL MEDICINE

## 2021-06-18 NOTE — PROGRESS NOTES
[address]   Advanced Heart Failure/Pulmonary Hypertension  Cardiac Follow up       Jaclyn Leggett  YOB: 1965    Date of Visit:  6/18/21    Chief Complaint   Patient presents with    Hypertension     History of Present Illness:  Jaclyn Leggett is a 48 y.o. female with past medical history significant for dCHF, PHTN, NIRALI, HTN, and CKD. Valsartan was discontinued in May 2018 secondary symptomatic hypotension with it's use (97/52). She sees Dr. Ghazal Stafford neurology for headaches. 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 Arnold Hernandez at [facility]. 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 She has been seeing Dr. Ghazal Stafford for headches and is taking Topiramate 50mg  one in the AM and two tabs at HS; (history of congenital migraines -- may need brain surgery to insert fat). He wanted to prescribe Verapamil for headaches, but her BP runs low, and she takes Midodrine. She was seen in [facility] 3/18/2021 with chest discomfort, was s/p Covid vaccine x 24hrs. Work up negative for acute disorders, and her pain self-resolved while there. She was discharged home in stable condition. She did not have any problems after the second vaccine. Today, she is doing pretty well. She is still getting a little winded after walking long distances. She says she recently gotten two dogs that has helped her to increase her stamina. She denies having chest pain or palpitations. She had recent lab work that looked good.      NYHA Class II    Allergies   Allergen Reactions    Lisinopril Other (See Comments)     cough    Statins Other (See Comments)     Light-headedness, cp, fast HR    Dilaudid [Hydromorphone Hcl] Nausea And Vomiting    Skelaxin [Metaxalone] Palpitations     Heart beats fast     Current Outpatient Medications   Medication Sig Dispense Refill    topiramate (TOPAMAX) 50 MG tablet Take 1 tablet by mouth 2 times daily 60 tablet 5    spironolactone (ALDACTONE) 25 MG tablet TAKE ONE TABLET BY MOUTH DAILY 90 tablet 3    potassium chloride (KLOR-CON M) 10 MEQ extended release tablet Take 2 tablets by mouth daily (Patient taking differently: Take 10 mEq by mouth daily ) 180 tablet 1    carvedilol (COREG) 6.25 MG tablet TAKE ONE TABLET BY MOUTH TWICE A DAY WITH MEALS 60 tablet 0    albuterol sulfate  (90 Base) MCG/ACT inhaler Inhale 2 puffs into the lungs every 6 hours as needed      estradiol (ESTRACE) 0.5 MG tablet TAKE ONE TABLET BY MOUTH DAILY      fenofibrate (TRICOR) 145 MG tablet Take 1 tablet by mouth      Multiple Vitamins-Minerals (MULTIVITAMIN ADULTS) TABS MULTIVITAMIN ADULTS TABS      furosemide (LASIX) 40 MG tablet TAKE ONE TABLET BY MOUTH DAILY (Patient taking differently: Take 20 mg by mouth three times a week ) 90 tablet 3    ezetimibe (ZETIA) 10 MG tablet TAKE ONE TABLET BY MOUTH DAILY 90 tablet 3    pravastatin (PRAVACHOL) 10 MG tablet TAKE ONE TABLET BY MOUTH DAILY 90 tablet 3    Cholecalciferol (VITAMIN D3) 50 MCG (2000 UT) CAPS Take 2,000 Units by mouth daily      midodrine (PROAMATINE) 2.5 MG tablet Take 1 tablet by mouth 3 times daily (Patient taking differently: Take 2.5 mg by mouth three times a week On Mon, Wed and Fri.) 90 tablet 1    nortriptyline (PAMELOR) 10 MG capsule Take 10 mg by mouth 2 times daily      Liraglutide (VICTOZA) 18 MG/3ML SOPN SC injection Inject 1.8 mg into the skin daily        No current facility-administered medications for this visit.        Past Medical History:   Diagnosis Date    Abdominal pain 9/9/2010    Arthritis     Asthma     Chest pain 6/22/2011    [facility]estive heart failure) [facility]     Diastolic heart failure     5/17/10-heart worsening-Dr. Regla Novak    Dyspnea     Heart failure, diastolic ([facility])     Hemoptysis 5/14/2012    Hyperlipidemia     Hypertension     Nausea & vomiting     PONV (postoperative nausea and vomiting)     Pulmonary hypertension (HCC)     Pulmonary hypertension due to left ventricular diastolic dysfunction ([address]     Renal failure     Sleep apnea     SUPPOSED TO USE CPAP    Vertigo      Past Surgical History:   Procedure Laterality Date    BREAST CYST EXCISION Right 2017    end     BREAST REDUCTION SURGERY      BREAST SURGERY      breast reduction    BRONCHOSCOPY  12    BRONCHOSCOPY  2016    w/ washing     SECTION      CHOLECYSTECTOMY      HERNIA REPAIR      HYSTERECTOMY  2015    LAPAROSCOPIC APPENDECTOMY  6/3/11    LAPAROSCOPY  2011    operative, with lysis of adhesions    NEUROMA SURGERY      OTHER SURGICAL HISTORY      3 rt heart caths and 2 left heart caths    SALPINGO-OOPHORECTOMY  6/3/11    Laparoscopic Right    SHOULDER SURGERY      SHOULDER SURGERY Right 2018    UPPER GASTROINTESTINAL ENDOSCOPY  11    biopsy    UPPER GASTROINTESTINAL ENDOSCOPY  2011    EUS     Family History   Problem Relation Age of Onset    Cancer Father         lung    Hypertension Father     High Blood Pressure Father     Hypertension Brother     High Blood Pressure Brother     Hypertension Mother    [facility] Other Mother         abdominal aortic aneursym - passes 2009    Heart Disease Mother     High Blood Pressure Mother     Cancer Paternal Grandmother     Hypertension Other         aunt    Stroke Maternal Grandfather     Cancer Maternal Aunt         breast    High Blood Pressure Maternal Aunt     Cancer Paternal Aunt         lung    Cancer Paternal Uncle         brain    Heart Disease Maternal Grandmother     High Blood Pressure Maternal Grandmother     Heart Disease Paternal Grandfather     Diabetes Neg Hx     Osteoporosis Neg Hx     Thyroid Disease Neg Hx     Anesth Problems Neg Hx     Malig Hyperten Neg Hx     Hypotension Neg Hx     Malig Hypertherm Neg Hx     Pseudochol.  Deficiency Neg Gastrointestinal: No abdominal pain, appetite loss, blood in stools. No change in bowel or bladder habits. · Genitourinary: No dysuria, trouble voiding, or hematuria. · Musculoskeletal:  No gait disturbance, weakness or joint complaints. · Integumentary: No rash or pruritis. · Neurological: Positive headaches, No diplopia, change in muscle strength, numbness or tingling. No change in gait, balance, coordination, mood, affect, memory, mentation, behavior. · Psychiatric: No anxiety, no depression. · Endocrine: No malaise, fatigue or temperature intolerance. No excessive thirst, fluid intake, or urination. No tremor. · Hematologic/Lymphatic: No abnormal bruising or bleeding, blood clots or swollen lymph nodes. · Allergic/Immunologic: No nasal congestion or hives. Physical Examination:    Vitals:    06/18/21 0847 06/18/21 0848   BP: (!) 98/58 96/68   Pulse: 70    SpO2: 98%    Weight: 163 lb 6.4 oz (74.1 kg)    Height: 5' 5\" (1.651 m)      Body mass index is 27.19 kg/m². Wt Readings from Last 3 Encounters:   06/18/21 163 lb 6.4 oz (74.1 kg)   06/10/21 158 lb (71.7 kg)   05/18/21 164 lb (74.4 kg)     BP Readings from Last 3 Encounters:   06/18/21 96/68   05/18/21 117/77   03/18/21 112/71      Constitutional and General Appearance:     WD/WN in NAD  HEENT:  NC/AT  Skin:  Warm, dry  Respiratory:  · Normal excursion and expansion without use of accessory muscles  · Resp Auscultation: Normal breath sounds without dullness  Cardiovascular:  · The apical impulses not displaced  · Heart tones are crisp and normal  · Cervical veins are not engorged  · The carotid upstroke is normal in amplitude and contour without delay or bruit  · JVP normal  RRR with nl S1 and S2 without m,r,g.  · Peripheral pulses are symmetrical and full. · There is no clubbing, cyanosis of the extremities. · No edema.   · Femoral Arteries: 2+ and equal.  · Pedal Pulses: 2+ and equal.   Neck:  · JVP less than 8 cm H2O  · No thyromegaly Abdomen:  · No masses or tenderness  · Liver/Spleen: No Abnormalities Noted  Neurological/Psychiatric:  · Alert and oriented in all spheres  · Moves all extremities well  · Exhibits normal gait balance and coordination  · No abnormalities of mood, affect, memory, mentation, or behavior are noted    Diagnostics:    Echo 11/30/2020  Technically limited due to poor acoustical windows. Normal left ventricle size, wall thickness and systolic function with an estimated ejection fraction of 55-60%. No regional wall motion abnormalities are seen. Indeterminate diastolic function. Unable to estimate pulmonary artery pressure secondary to incomplete TR jet envelope. The right ventricle is normal in size and function. Echo 3/7/19  Summary   -Normal left ventricle size, wall thickness, and systolic function with an estimated ejection fraction of 55-60% visually, 58% by 3D HM and 55% by 3D FV.   -No regional wall motion abnormalities are seen.   -Trivial pulmonic regurgitation present.   -Normal diastolic function. Avg. E/e'=7.75      Echo 8/25/2017   Summary   Normal left ventricle size, wall thickness and systolic function with an   estimated ejection fraction of 55-60%.  Trivial tricuspid regurgitation. Echo 8/26/2016  Summary  Normal left ventricle size, wall thickness and systolic function with an EF of 55%. Reversal of E/A inflow velocities across the mitral valve suggesting impaired left ventricular relaxation.     Cardiac Cath 5/8/14  LM-normal  LAD-normal  Cx- normal  RCA-normal, dominant   LVEF-55%     Hemodynamics: RA mean 4  RV 32/0  PA 32/11 mean 19  PAW 10/9 mean 5  Thermal: C.O. 4.06 and C.I. 2.05  Glenn: C.O. 4.96 and C.I. 2.51      Labs were reviewed including labs from other hospital systems through [facility]. Cardiac testing was reviewed including echos, nuclear scans, cardiac catheterization, including from other hospital systems through [facility]. Assessment:  1.  Diastolic CHF, chronic ([address]    2. Essential hypertension    3. Pure hypercholesterolemia    4. SOB (shortness of breath)    5. Mixed hyperlipidemia        1. Diastolic CHF, chronic: Stable. Compensated by exam.     -ProBNP 29 (6/4/21)  -Continue Spironolactone, Lasix 2-3xwk, Coreg. Midodrine MWF    -ECHO 11/30/2020>EF 55-60%, indeterminate diastolic function. -ECHO 3/7/2019> EF 59-93%, normal diastolic function. 2. Essential hypertension: Stable  BP 96/68   Pulse 70   Ht 5' 5\" (1.651 m)   Wt 163 lb 6.4 oz (74.1 kg)   SpO2 98%   BMI 27.19 kg/m²       3. Pure hypercholesterolemia:  6/4/21> , TG 96, HDL 43,   -Continue Pravastatin & Zetia. 4. SOB (shortness of breath):  SOB with stairs. 5. Orthostatic hypotension, history of: Stable  Reported light headedness and dizziness when SBP < 90, DBP < 60 in the past  Taking Midodrine 2.5mg (3 times a week)  Reports she is staying hydrated. Plan:  1. No med changes  2. CBC, CMP, BNP, lipid, Vit D when 8 hours fasting in 6 months  3. RTO in 6 months with echo    Time Based Itemization  A total of 30 minutes was spent on today's patient encounter. If applicable, non-patient-facing activities:  ( x)Preparing to see the patient and reviewing records  ( ) Individual interpretation of results  ( ) Discussion or coordination of care with other health care professionals  ( x) Ordering of unique tests, medications, or procedures  ( x) Documentation within the EHR      I appreciate the opportunity of cooperating in the care of this patient. Holly Macedo M.D., [address] [place], [place] [address]y [address], Steffen Mcclendon, am scribing for and in the presence of Vernell Perez MD.   Signed, Steffen Mcclendon 06/18/21 9:47 AM     The scribe's documentation has been prepared under my direction and personally reviewed by me in its entirety. I confirm that the note above accurately reflects all work, treatment, procedures, and medical decision making performed by me.

## 2021-06-18 NOTE — PATIENT INSTRUCTIONS
1. No med changes  2. CBC, CMP, BNP, lipid, Vit D Labs when 8 hours fasting in 6 months  3.  RTO in 6 months with echo

## 2021-06-25 RX ORDER — MIDODRINE HYDROCHLORIDE 2.5 MG/1
2.5 TABLET ORAL
Qty: 30 TABLET | Refills: 5 | Status: SHIPPED | OUTPATIENT
Start: 2021-06-25 | End: 2022-06-15 | Stop reason: SDUPTHER

## 2021-06-25 NOTE — TELEPHONE ENCOUNTER
Medication Refill    Medication needing refilled: midodrine (PROAMATINE) 2.5 MG tablet    Dosage of the medication:2.5 mg    How are you taking this medication (QD, BID, TID, QID, PRN):   Sig: Take 1 tablet by mouth 3 times daily   Patient taking differently: Take 2.5 mg by mouth three times a week On Mon, Wed and Fri.          30 or 90 day supply called in:90    When will you run out of your medication: [place] [address] [facility] are we sending the medication to?: [facility] [address] [place] - [address] [facility] [phone]   [place] [address]   Phone:  [phone]  Fax:  [phone]

## 2021-08-06 RX ORDER — POTASSIUM CHLORIDE 750 MG/1
TABLET, EXTENDED RELEASE ORAL
Qty: 180 TABLET | Refills: 3 | Status: SHIPPED | OUTPATIENT
Start: 2021-08-06 | End: 2022-06-15 | Stop reason: SDUPTHER

## 2021-08-16 RX ORDER — PRAVASTATIN SODIUM 10 MG
10 TABLET ORAL DAILY
Qty: 90 TABLET | Refills: 3 | Status: SHIPPED | OUTPATIENT
Start: 2021-08-16 | End: 2022-06-15 | Stop reason: SDUPTHER

## 2021-08-16 RX ORDER — PRAVASTATIN SODIUM 10 MG
TABLET ORAL
Qty: 90 TABLET | Refills: 3 | Status: SHIPPED
Start: 2021-08-16 | End: 2021-11-29 | Stop reason: SDUPTHER

## 2021-08-16 NOTE — TELEPHONE ENCOUNTER
Medication Refill    Medication needing refilled:  pravastatin (PRAVACHOL) 10 MG      Dosage of the medication:    How are you taking this medication (QD, BID, TID, QID, PRN):   ONE TABLET BY MOUTH DAILY    30 or 90 day supply called in: 90 day supply    When will you run out of your medication:    Which Pharmacy are we sending the medication to?:   [facility] [address] [place], [place] - [address] [phone]    [facility] [address] [place] [address]   Phone:  [phone]  Fax:  [phone]

## 2021-09-13 DIAGNOSIS — E78.00 PURE HYPERCHOLESTEROLEMIA: ICD-10-CM

## 2021-09-13 DIAGNOSIS — I10 ESSENTIAL HYPERTENSION: ICD-10-CM

## 2021-09-13 DIAGNOSIS — R06.02 SOB (SHORTNESS OF BREATH): ICD-10-CM

## 2021-09-13 DIAGNOSIS — I50.32 DIASTOLIC CHF, CHRONIC (HCC): ICD-10-CM

## 2021-09-13 RX ORDER — EZETIMIBE 10 MG/1
TABLET ORAL
Qty: 90 TABLET | Refills: 3 | Status: SHIPPED | OUTPATIENT
Start: 2021-09-13 | End: 2022-06-15 | Stop reason: SDUPTHER

## 2021-09-13 NOTE — TELEPHONE ENCOUNTER
Medication Refill    Medication needing refilled: ezetimibe (ZETIA) 10 MG tablet    Dosage of the medication: 10 mg    How are you taking this medication (QD, BID, TID, QID, PRN): 1 tab daily    30 or 90 day supply called in: 90    When will you run out of your medication: Pt is out of medication    Which Pharmacy are we sending the medication to?: [email or web address] [address] [place], [place] - [address] [facility] [phone]   [place] [address]   Phone:  [phone]  Fax:  [phone]

## 2021-09-13 NOTE — TELEPHONE ENCOUNTER
Prescription refill    Last OV:6-18-21    Last Refill:9-22-20    Labs:6-24-21 lipid    Future Appt:11-22-21 maldonado

## 2021-09-28 ENCOUNTER — TELEPHONE (OUTPATIENT)
Dept: CARDIOLOGY CLINIC | Age: 56
End: 2021-09-28

## 2021-09-28 DIAGNOSIS — R07.9 CHEST PAIN, UNSPECIFIED TYPE: ICD-10-CM

## 2021-09-28 DIAGNOSIS — I50.32 DIASTOLIC CHF, CHRONIC (HCC): Primary | ICD-10-CM

## 2021-09-28 DIAGNOSIS — R06.02 SOB (SHORTNESS OF BREATH): ICD-10-CM

## 2021-09-28 NOTE — TELEPHONE ENCOUNTER
Pt states last week she began having increasing episodes of chest pain a few times it  Radiates across her shoulders. She is having sob with exertion and  fatigue. Please call to advise.

## 2021-09-28 NOTE — TELEPHONE ENCOUNTER
The episodes of chest pains occur with activity like walking or/and cleaning floors. + dizziness or lightheaded. Radiates across her shoulders on her upper back. Rate 6-7. It lasted 3:00pm into the evening. It has awaked her from sleep. Sunday, felt better. She feels tired but no chest pain today. Felt worn out. Night chest pain x 2 addition times. Tried Tums. Did not work. She c/o being winded with walking while [place] birthday. Needed to stop to rest.      Last stress test:  4/2014  Normal perfusion study. Normal LV. Last echo:   11/2020  Normal left ventricle size, wall thickness and systolic function with an   estimated ejection fraction of 55-60%. No regional wall motion abnormalities are seen. Last , HDL 43 on 6/4/21    She has appt with her granddaughter at 2:30pm.  OK to leave .

## 2021-10-05 ENCOUNTER — HOSPITAL ENCOUNTER (OUTPATIENT)
Dept: NON INVASIVE DIAGNOSTICS | Age: 56
Discharge: HOME | End: 2021-10-05
Payer: COMMERCIAL

## 2021-10-05 DIAGNOSIS — I50.32 DIASTOLIC CHF, CHRONIC (HCC): ICD-10-CM

## 2021-10-05 DIAGNOSIS — R07.9 CHEST PAIN, UNSPECIFIED TYPE: ICD-10-CM

## 2021-10-05 DIAGNOSIS — R06.02 SOB (SHORTNESS OF BREATH): ICD-10-CM

## 2021-10-05 LAB
LV EF: 62 %
LVEF MODALITY: NORMAL

## 2021-10-05 PROCEDURE — 93017 CV STRESS TEST TRACING ONLY: CPT | Performed by: INTERNAL MEDICINE

## 2021-10-05 PROCEDURE — 78452 HT MUSCLE IMAGE SPECT MULT: CPT

## 2021-10-05 PROCEDURE — 3430000000 HC RX DIAGNOSTIC RADIOPHARMACEUTICAL: Performed by: INTERNAL MEDICINE

## 2021-10-05 PROCEDURE — A9502 TC99M TETROFOSMIN: HCPCS | Performed by: INTERNAL MEDICINE

## 2021-10-05 RX ADMIN — TETROFOSMIN 30 MILLICURIE: 1.38 INJECTION, POWDER, LYOPHILIZED, FOR SOLUTION INTRAVENOUS at 09:24

## 2021-10-05 RX ADMIN — TETROFOSMIN 10 MILLICURIE: 1.38 INJECTION, POWDER, LYOPHILIZED, FOR SOLUTION INTRAVENOUS at 08:10

## 2021-10-05 NOTE — PROGRESS NOTES
Patient instructed on Beltran Protocol Stress Test Procedure including possible side effects and adverse reactions. Verbalizes knowledge and understanding and denies having any questions.  Pre Stress BP-     HR--

## 2021-10-25 RX ORDER — FUROSEMIDE 40 MG/1
TABLET ORAL
Qty: 90 TABLET | Refills: 3 | Status: SHIPPED | OUTPATIENT
Start: 2021-10-25 | End: 2022-05-02

## 2021-10-25 RX ORDER — FUROSEMIDE 40 MG/1
TABLET ORAL
Qty: 90 TABLET | Refills: 3 | Status: SHIPPED | OUTPATIENT
Start: 2021-10-25 | End: 2022-05-02 | Stop reason: DRUGHIGH

## 2021-10-25 NOTE — TELEPHONE ENCOUNTER
Prescription refill    Last OV: 6-18-21    Last Refill: 9-22-20    Labs:6-4-21    Future Appt:11-29-21 NPKV    Rx pending

## 2021-10-25 NOTE — TELEPHONE ENCOUNTER
Medication Refill    Medication needing refilled:furosemide (LASIX)        Dosage of the medication: 40 mg     How are you taking this medication (QD, BID, TID, QID, PRN):   Once daily  30 or 90 day supply called in: 90 day supply    When will you run out of your medication:    Which Pharmacy are we sending the medication to?: [facility] [address] [place] [address] [address] [facility] [phone]    [facility] [address] [place] [address]   Phone:  [phone]  Fax:  [phone]

## 2021-11-19 NOTE — PROGRESS NOTES
[facility], [place], operated by [facility]   Congestive Heart Failure    PrimaryCare Doctor:  Taqueria SCHOFIELD  Primary Cardiologist: Corinna Brooks       Chief Complaint:  SOB    History of Present Illness:  Nancie Narvaez is a 55 y.o. female with PMH HTN, HFpEF, PAH, NIRALI, CKD who presents today for CHF f/u. At the last OV 6months ago, she c/o CHUNG and HA and no med changes  Phone call after labs 11/23: BNP increase could be due to extra fluid. If her wt has increased in the past week or two and or she is having increased SOB/edema then she can take an extra water pill for 3 days. If all ok, no changes and we can discuss it more at [address] next week  Today she c/o chest pain, dyspnea, PND, and fatigue, she has been taking lasix 40mg/day for the past 4 days. Her wt is stable and BP lower than her usual today  she denies palpitations, orthopnea, exertional chest pressure/discomfort, early saiety, edema, syncope. ER Visit: No  Recent Hospitalization: No    Baseline Weight: 163  Wt Readings from Last 3 Encounters:   06/18/21 163 lb 6.4 oz (74.1 kg)   06/10/21 158 lb (71.7 kg)   05/18/21 164 lb (74.4 kg)          EF: 55-60%  Cardiac Imaging:  Echo 11/30/2020  Technically limited due to poor acoustical windows.   Normal left ventricle size, wall thickness and systolic function with an estimated ejection fraction of 55-60%.   No regional wall motion abnormalities are seen.   Indeterminate diastolic function.   Unable to estimate pulmonary artery pressure secondary to incomplete TR jet envelope.   The right ventricle is normal in size and function    Cardiac Cath 5/8/14  LM-normal  LAD-normal  Cx- normal  RCA-normal, dominant   LVEF-55%     Hemodynamics: RA mean 4  RV 32/0  PA 32/11 mean 19  PAW 10/9 mean 5  Thermal: C.O. 4.06 and C.I. 2.05  Glenn: C.O. 4.96 and C.I. 2.51       Activity: decreased recently  Can you walk 1-2 blocks or do a moderate amount of house/yard work? Yes      NYHA Class: II       Sodium Restrictions: 3g  Fluid Restrictions: 48-64 oz/day  Sodium and fluid restriction compliance: good    Pt Education: The patient has received education on the following topics: dietary sodium restriction, heart failure medications, the importance of physical activity, symptom management and weight monitoring     NIRALI No     Past Medical History:   has a past medical history of Abdominal pain, Arthritis, Asthma, Chest pain, CHF (congestive heart failure) ([address] Diastolic heart failure, Dyspnea, Heart failure, diastolic ([address] Hemoptysis, Hyperlipidemia, Hypertension, Nausea & vomiting, PONV (postoperative nausea and vomiting), Pulmonary hypertension ([address] Pulmonary hypertension due to left ventricular diastolic dysfunction ([address] Renal failure, Sleep apnea, and Vertigo. Surgical History:   has a past surgical history that includes Breast surgery; Cholecystectomy; hernia repair;  section; other surgical history (); laparoscopy (2011); laparoscopic appendectomy (6/3/11); Salpingo-oophorectomy (6/3/11); Upper gastrointestinal endoscopy (11); Upper gastrointestinal endoscopy (2011); bronchoscopy (12); Neuroma surgery; shoulder surgery; Hysterectomy (2015); bronchoscopy (2016); Breast cyst excision (Right, 2017); shoulder surgery (Right, 2018); and Breast reduction surgery. Social History:   reports that she has never smoked. She has never used smokeless tobacco. She reports previous alcohol use. She reports that she does not use drugs.    Family History:   Family History   Problem Relation Age of Onset   [facility] Cancer Father         lung    Hypertension Father     High Blood Pressure Father     Hypertension Brother     High Blood Pressure Brother     Hypertension Mother    [facility] Other Mother         abdominal aortic aneursym - passes 2009    Heart Disease Mother     High Blood Pressure Mother     Cancer Paternal Grandmother     Hypertension Other         aunt    Stroke Maternal Grandfather     Cancer Maternal Aunt         breast    High Blood Pressure Maternal Aunt     Cancer Paternal Aunt         lung    Cancer Paternal Uncle         brain    Heart Disease Maternal Grandmother     High Blood Pressure Maternal Grandmother     Heart Disease Paternal Grandfather     Diabetes Neg Hx     Osteoporosis Neg Hx     Thyroid Disease Neg Hx     Anesth Problems Neg Hx     Malig Hyperten Neg Hx     Hypotension Neg Hx     Malig Hypertherm Neg Hx     Pseudochol. Deficiency Neg Hx        HomeMedications:  Prior to Admission medications    Medication Sig Start Date End Date Taking?  Authorizing Provider   pravastatin (PRAVACHOL) 10 MG tablet TAKE ONE TABLET BY MOUTH DAILY 8/16/21   Sonja Robb MD   furosemide (LASIX) 40 MG tablet TAKE ONE TABLET BY MOUTH DAILY 10/25/21   Sonja Robb MD   furosemide (LASIX) 40 MG tablet TAKE ONE TABLET BY MOUTH DAILY 10/25/21   Sonja Robb MD   ezetimibe (ZETIA) 10 MG tablet TAKE ONE TABLET BY MOUTH DAILY 9/13/21   Sonja Robb MD   pravastatin (PRAVACHOL) 10 MG tablet Take 1 tablet by mouth daily 8/16/21   Sonja Robb MD   potassium chloride (KLOR-CON M) 10 MEQ extended release tablet TAKE TWO TABLETS BY MOUTH DAILY 8/6/21   Sonja Robb MD   carvedilol (COREG) 6.25 MG tablet TAKE ONE TABLET BY MOUTH TWICE A DAY WITH MEALS 7/8/21   Sonja Robb MD   midodrine (PROAMATINE) 2.5 MG tablet Take 1 tablet by mouth three times a week On Mon, Wed and Fri. 6/25/21   Sonja Robb MD   topiramate (TOPAMAX) 50 MG tablet Take 1 tablet by mouth 2 times daily 5/18/21   Anu Montalvo MD   spironolactone (ALDACTONE) 25 MG tablet TAKE ONE TABLET BY MOUTH DAILY 4/19/21   Sonja Robb MD   albuterol sulfate  (90 Base) MCG/ACT inhaler Inhale 2 puffs into the lungs every 6 hours as needed 7/30/20   Historical Provider, MD   estradiol (ESTRACE) 0.5 MG tablet TAKE ONE TABLET BY MOUTH DAILY 12/8/19   Historical Provider, MD   fenofibrate (TRICOR) 145 MG tablet Take 1 tablet by mouth    Historical Provider, MD   Multiple Vitamins-Minerals (MULTIVITAMIN ADULTS) TABS MULTIVITAMIN ADULTS TABS 11/13/20   Historical Provider, MD   Cholecalciferol (VITAMIN D3) 50 MCG (2000 UT) CAPS Take 2,000 Units by mouth daily    Historical Provider, MD   nortriptyline (PAMELOR) 10 MG capsule Take 10 mg by mouth 2 times daily    Historical Provider, MD   Liraglutide (VICTOZA) 18 MG/3ML SOPN SC injection Inject 1.8 mg into the skin daily     Historical Provider, MD        Allergies:  Lisinopril, Statins, Dilaudid [hydromorphone hcl], and Skelaxin [metaxalone]     ROS:   Review of Systems   Constitutional: Positive for fatigue. Respiratory: Positive for cough and shortness of breath. Cardiovascular: Positive for chest pain. Gastrointestinal: Positive for nausea. Genitourinary: Negative. Musculoskeletal: Negative. Skin: Negative. Neurological: Negative. Hematological: Negative. Psychiatric/Behavioral: Negative. Physical Examination:    Vitals:    11/29/21 0910   BP: (!) 88/56   Pulse: 69   SpO2: 96%   Weight: 163 lb (73.9 kg)   Height: 5' 5\" (1.651 m)           Physical Exam  Vitals reviewed. Constitutional:       Appearance: Normal appearance. She is normal weight. HENT:      Head: Normocephalic and atraumatic. Eyes:      Extraocular Movements: Extraocular movements intact. Pupils: Pupils are equal, round, and reactive to light. Cardiovascular:      Rate and Rhythm: Normal rate and regular rhythm. Pulses: Normal pulses. Heart sounds: Normal heart sounds. Pulmonary:      Effort: Pulmonary effort is normal.      Breath sounds: Normal breath sounds. Abdominal:      Palpations: Abdomen is soft. Musculoskeletal:         General: Normal range of motion. Cervical back: Normal range of motion and neck supple. Skin:     General: Skin is warm and dry. Neurological:      General: No focal deficit present.       Mental Status: She day or 5lb in one week, Limit sodium to 2000mg/day and fluids to 2L or 64oz/day.    4. Follow up: 6 months with Dr. Sujata Benson: [phone]        I appreciate the opportunity of cooperating in the care of this individual.    Robbie Huber, HELEN - CNP, CNP, 11/19/2021,10:26 AM

## 2021-11-22 ENCOUNTER — HOSPITAL ENCOUNTER (OUTPATIENT)
Dept: NON INVASIVE DIAGNOSTICS | Age: 56
Discharge: HOME | End: 2021-11-22
Payer: COMMERCIAL

## 2021-11-22 ENCOUNTER — HOSPITAL ENCOUNTER (OUTPATIENT)
Age: 56
Discharge: HOME | End: 2021-11-22
Payer: COMMERCIAL

## 2021-11-22 DIAGNOSIS — I10 ESSENTIAL HYPERTENSION: ICD-10-CM

## 2021-11-22 DIAGNOSIS — R06.02 SOB (SHORTNESS OF BREATH): ICD-10-CM

## 2021-11-22 DIAGNOSIS — I50.32 DIASTOLIC CHF, CHRONIC (HCC): ICD-10-CM

## 2021-11-22 LAB
A/G RATIO: 1.5 (ref 1.1–2.2)
ALBUMIN SERPL-MCNC: 4.3 G/DL (ref 3.4–5)
ALP BLD-CCNC: 78 U/L (ref 40–129)
ALT SERPL-CCNC: 16 U/L (ref 10–40)
ANION GAP SERPL CALCULATED.3IONS-SCNC: 11 MMOL/L (ref 3–16)
AST SERPL-CCNC: 20 U/L (ref 15–37)
BILIRUB SERPL-MCNC: 0.3 MG/DL (ref 0–1)
BUN BLDV-MCNC: 22 MG/DL (ref 7–20)
CALCIUM SERPL-MCNC: 9.2 MG/DL (ref 8.3–10.6)
CHLORIDE BLD-SCNC: 110 MMOL/L (ref 99–110)
CHOLESTEROL, TOTAL: 165 MG/DL (ref 0–199)
CO2: 25 MMOL/L (ref 21–32)
CREAT SERPL-MCNC: 1.1 MG/DL (ref 0.6–1.1)
CREATININE URINE: 130.1 MG/DL (ref 28–259)
GFR AFRICAN AMERICAN: >60
GFR NON-AFRICAN AMERICAN: 52
GLUCOSE BLD-MCNC: 102 MG/DL (ref 70–99)
HCT VFR BLD CALC: 35.9 % (ref 36–48)
HDLC SERPL-MCNC: 39 MG/DL (ref 40–60)
HEMOGLOBIN: 11.9 G/DL (ref 12–16)
LDL CHOLESTEROL CALCULATED: 101 MG/DL
LV EF: 55 %
LVEF MODALITY: NORMAL
MCH RBC QN AUTO: 28.6 PG (ref 26–34)
MCHC RBC AUTO-ENTMCNC: 33.2 G/DL (ref 31–36)
MCV RBC AUTO: 86.2 FL (ref 80–100)
PARATHYROID HORMONE INTACT: 31.2 PG/ML (ref 14–72)
PDW BLD-RTO: 12.7 % (ref 12.4–15.4)
PLATELET # BLD: 238 K/UL (ref 135–450)
PMV BLD AUTO: 9.8 FL (ref 5–10.5)
POTASSIUM SERPL-SCNC: 4.2 MMOL/L (ref 3.5–5.1)
PRO-BNP: 327 PG/ML (ref 0–124)
PROTEIN PROTEIN: 12 MG/DL
RBC # BLD: 4.17 M/UL (ref 4–5.2)
SODIUM BLD-SCNC: 146 MMOL/L (ref 136–145)
TOTAL PROTEIN: 7.2 G/DL (ref 6.4–8.2)
TRIGL SERPL-MCNC: 125 MG/DL (ref 0–150)
VITAMIN D 25-HYDROXY: 59.6 NG/ML
VLDLC SERPL CALC-MCNC: 25 MG/DL
WBC # BLD: 4.1 K/UL (ref 4–11)

## 2021-11-22 PROCEDURE — 93306 TTE W/DOPPLER COMPLETE: CPT

## 2021-11-22 PROCEDURE — 83880 ASSAY OF NATRIURETIC PEPTIDE: CPT

## 2021-11-22 PROCEDURE — 84156 ASSAY OF PROTEIN URINE: CPT

## 2021-11-22 PROCEDURE — 85027 COMPLETE CBC AUTOMATED: CPT

## 2021-11-22 PROCEDURE — 82570 ASSAY OF URINE CREATININE: CPT

## 2021-11-22 PROCEDURE — 82306 VITAMIN D 25 HYDROXY: CPT

## 2021-11-22 PROCEDURE — 83970 ASSAY OF PARATHORMONE: CPT

## 2021-11-22 PROCEDURE — 80061 LIPID PANEL: CPT

## 2021-11-22 PROCEDURE — 80053 COMPREHEN METABOLIC PANEL: CPT

## 2021-11-23 ENCOUNTER — TELEPHONE (OUTPATIENT)
Dept: CARDIOLOGY CLINIC | Age: 56
End: 2021-11-23

## 2021-11-23 NOTE — TELEPHONE ENCOUNTER
BNP increase could be due to extra fluid. If her wt has increased in the past week or two and or she is having increased SOB/edema then she can take an extra water pill for 3 days. If all ok, no changes and we can discuss it more at OV next week.  Alec Deluna

## 2021-11-23 NOTE — TELEPHONE ENCOUNTER
She had labs and echo yesterday at [facility]. She saw lab results on MY CHART and her BNP elevated considerably from the last time she had that test done  (was in the 70's now in the 300's). Asking that nurse call to discuss her lab results .

## 2021-11-29 ENCOUNTER — HOSPITAL ENCOUNTER (OUTPATIENT)
Age: 56
Discharge: HOME | End: 2021-11-29
Payer: COMMERCIAL

## 2021-11-29 ENCOUNTER — OFFICE VISIT (OUTPATIENT)
Dept: CARDIOLOGY CLINIC | Age: 56
End: 2021-11-29
Payer: COMMERCIAL

## 2021-11-29 VITALS
HEART RATE: 69 BPM | SYSTOLIC BLOOD PRESSURE: 88 MMHG | OXYGEN SATURATION: 96 % | BODY MASS INDEX: 27.16 KG/M2 | HEIGHT: 65 IN | DIASTOLIC BLOOD PRESSURE: 56 MMHG | WEIGHT: 163 LBS

## 2021-11-29 DIAGNOSIS — I50.32 DIASTOLIC CHF, CHRONIC (HCC): Primary | ICD-10-CM

## 2021-11-29 DIAGNOSIS — I95.1 ORTHOSTATIC HYPOTENSION: ICD-10-CM

## 2021-11-29 DIAGNOSIS — I27.20 PULMONARY HYPERTENSION (HCC): ICD-10-CM

## 2021-11-29 LAB
ANION GAP SERPL CALCULATED.3IONS-SCNC: 13 MMOL/L (ref 3–16)
BUN BLDV-MCNC: 22 MG/DL (ref 7–20)
CALCIUM SERPL-MCNC: 9.7 MG/DL (ref 8.3–10.6)
CHLORIDE BLD-SCNC: 100 MMOL/L (ref 99–110)
CO2: 25 MMOL/L (ref 21–32)
CREAT SERPL-MCNC: 1.2 MG/DL (ref 0.6–1.1)
GFR AFRICAN AMERICAN: 56
GFR NON-AFRICAN AMERICAN: 47
GLUCOSE BLD-MCNC: 102 MG/DL (ref 70–99)
POTASSIUM SERPL-SCNC: 3.6 MMOL/L (ref 3.5–5.1)
PRO-BNP: 63 PG/ML (ref 0–124)
SODIUM BLD-SCNC: 138 MMOL/L (ref 136–145)

## 2021-11-29 PROCEDURE — 80048 BASIC METABOLIC PNL TOTAL CA: CPT

## 2021-11-29 PROCEDURE — 36415 COLL VENOUS BLD VENIPUNCTURE: CPT

## 2021-11-29 PROCEDURE — 83880 ASSAY OF NATRIURETIC PEPTIDE: CPT

## 2021-11-29 PROCEDURE — 99214 OFFICE O/P EST MOD 30 MIN: CPT | Performed by: NURSE PRACTITIONER

## 2021-11-29 ASSESSMENT — ENCOUNTER SYMPTOMS
COUGH: 1
SHORTNESS OF BREATH: 1
NAUSEA: 1

## 2021-11-29 NOTE — PATIENT INSTRUCTIONS
Instructions:   1. Medications: decrease lasix back to regular dose  2. Labs: today  3. Lifestyle Recommendations: Weigh yourself every day in the morning after urination, call Cain Marjorie if wt increases 2-3lb in one day or 5lb in one week, Limit sodium to 2000mg/day and fluids to 2L or 64oz/day.    4. Follow up: 6 months with Dr. Virginia Dougherty: [phone]

## 2021-12-13 ENCOUNTER — TELEPHONE (OUTPATIENT)
Dept: CARDIOLOGY CLINIC | Age: 56
End: 2021-12-13

## 2021-12-13 NOTE — TELEPHONE ENCOUNTER
Spoke with pt advising that her FMLA was faxed initially on 12/2 and again today. Confirmation received on both.

## 2021-12-13 NOTE — TELEPHONE ENCOUNTER
Pt called stating her work has not received the [facility] papers they should be sent to Madhu Doll: [phone]

## 2021-12-20 ENCOUNTER — OFFICE VISIT (OUTPATIENT)
Dept: NEUROLOGY | Age: 56
End: 2021-12-20
Payer: COMMERCIAL

## 2021-12-20 VITALS
HEART RATE: 74 BPM | WEIGHT: 159 LBS | SYSTOLIC BLOOD PRESSURE: 96 MMHG | HEIGHT: 65 IN | DIASTOLIC BLOOD PRESSURE: 71 MMHG | BODY MASS INDEX: 26.49 KG/M2

## 2021-12-20 DIAGNOSIS — G43.709 CHRONIC MIGRAINE W/O AURA W/O STATUS MIGRAINOSUS, NOT INTRACTABLE: Primary | ICD-10-CM

## 2021-12-20 DIAGNOSIS — E11.42 DIABETIC POLYNEUROPATHY ASSOCIATED WITH TYPE 2 DIABETES MELLITUS (HCC): ICD-10-CM

## 2021-12-20 DIAGNOSIS — R90.89 ABNORMAL FINDING ON MRI OF BRAIN: ICD-10-CM

## 2021-12-20 PROCEDURE — 99214 OFFICE O/P EST MOD 30 MIN: CPT | Performed by: PSYCHIATRY & NEUROLOGY

## 2021-12-20 RX ORDER — TOPIRAMATE 50 MG/1
50 TABLET, FILM COATED ORAL 2 TIMES DAILY
Qty: 180 TABLET | Refills: 1 | Status: SHIPPED | OUTPATIENT
Start: 2021-12-20 | End: 2022-06-28

## 2022-04-10 DIAGNOSIS — I50.32 DIASTOLIC CHF, CHRONIC (HCC): Chronic | ICD-10-CM

## 2022-04-10 DIAGNOSIS — R06.02 SOB (SHORTNESS OF BREATH): Chronic | ICD-10-CM

## 2022-04-10 DIAGNOSIS — I10 ESSENTIAL HYPERTENSION: ICD-10-CM

## 2022-04-10 DIAGNOSIS — E78.00 PURE HYPERCHOLESTEROLEMIA: Chronic | ICD-10-CM

## 2022-04-11 RX ORDER — SPIRONOLACTONE 25 MG/1
TABLET ORAL
Qty: 90 TABLET | Refills: 0 | Status: SHIPPED | OUTPATIENT
Start: 2022-04-11 | End: 2022-06-15 | Stop reason: SDUPTHER

## 2022-05-02 ENCOUNTER — TELEPHONE (OUTPATIENT)
Dept: CARDIOLOGY CLINIC | Age: 57
End: 2022-05-02

## 2022-05-02 ENCOUNTER — OFFICE VISIT (OUTPATIENT)
Dept: CARDIOLOGY CLINIC | Age: 57
End: 2022-05-02
Payer: COMMERCIAL

## 2022-05-02 VITALS
BODY MASS INDEX: 26.98 KG/M2 | SYSTOLIC BLOOD PRESSURE: 96 MMHG | DIASTOLIC BLOOD PRESSURE: 70 MMHG | HEART RATE: 86 BPM | WEIGHT: 161.9 LBS | OXYGEN SATURATION: 96 % | HEIGHT: 65 IN

## 2022-05-02 DIAGNOSIS — I50.32 DIASTOLIC CHF, CHRONIC (HCC): ICD-10-CM

## 2022-05-02 DIAGNOSIS — R07.9 CHEST PAIN AT REST: Primary | ICD-10-CM

## 2022-05-02 DIAGNOSIS — R06.02 SOB (SHORTNESS OF BREATH): ICD-10-CM

## 2022-05-02 DIAGNOSIS — I95.89 CHRONIC HYPOTENSION: ICD-10-CM

## 2022-05-02 PROCEDURE — 93000 ELECTROCARDIOGRAM COMPLETE: CPT | Performed by: CLINICAL NURSE SPECIALIST

## 2022-05-02 PROCEDURE — 99214 OFFICE O/P EST MOD 30 MIN: CPT | Performed by: CLINICAL NURSE SPECIALIST

## 2022-05-02 RX ORDER — FUROSEMIDE 40 MG/1
20 TABLET ORAL SEE ADMIN INSTRUCTIONS
Qty: 30 TABLET | Refills: 0 | Status: SHIPPED
Start: 2022-05-02 | End: 2022-06-15 | Stop reason: SDUPTHER

## 2022-05-02 NOTE — TELEPHONE ENCOUNTER
Pt called stating she is having CP, SOB, discomfort in chest, arm and back.     Pt did get scheduled with [facility] today at 1pm, pt stated she did not want to go to ER, pt wanted to see MADELINE, however there ar no openings, and pt was in agreement to see a NP.

## 2022-05-02 NOTE — PATIENT INSTRUCTIONS
1. Check blood work today  2. Continue all current medications  3.   RTO June 15th with Dr Francesca Joel along with an echo

## 2022-05-02 NOTE — PROGRESS NOTES
[address]  Progress Note    Primary Care Doctor:  Arianne SCHOFIELD    Chief Complaint   Patient presents with    Chest Pain    Shortness of Breath        History of Present Illness:  55 y.o. female with history of hypertension, diastolic heart failure, antwan, ckd, acoustic neuroma with Dr Afsaneh Vicente at [facility] had the pleasure of seeing Jenn Blake in follow up for urgent visit for chest pain. She is ambulatory and by her self. She had chest pain all weekend (started Friday). It is across her chest, up to her shoulder and back. She takes lasix as needed, usually 3 times a week. Her weight [phone]. She had labs done by her PCP in March, no bnp. She is leaving for [place] next Friday. Her PCP stopped her victoza as A1c is 6. She had an EGD 2-3 months ago and esophagus stretched. She had a normal LHC in  and normal stress test 10/21. She is shortness of breath when going up stairs. She sleeps on her right side up on pillows. No ankle edema. She did take lasix today and chest pain seems less. EKG sinus. She takes midodrine a couple times a week    Past Medical History:   has a past medical history of Abdominal pain, Arthritis, Asthma, Chest pain, CHF (congestive heart failure) ([address] Diastolic heart failure, Dyspnea, Heart failure, diastolic ([address] Hemoptysis, Hyperlipidemia, Hypertension, Nausea & vomiting, PONV (postoperative nausea and vomiting), Pulmonary hypertension ([address] Pulmonary hypertension due to left ventricular diastolic dysfunction ([address] Renal failure, Sleep apnea, and Vertigo. Surgical History:   has a past surgical history that includes Breast surgery; Cholecystectomy; hernia repair;  section; other surgical history (); laparoscopy (2011); laparoscopic appendectomy (6/3/11); Salpingo-oophorectomy (6/3/11); Upper gastrointestinal endoscopy (11); Upper gastrointestinal endoscopy (2011); bronchoscopy (12);  Neuroma surgery; shoulder surgery; Hysterectomy (August 2015); bronchoscopy (4-8-2016); Breast cyst excision (Right, 03/28/2017); shoulder surgery (Right, 01/2018); and Breast reduction surgery. Social History:   reports that she has never smoked. She has never used smokeless tobacco. She reports previous alcohol use. She reports that she does not use drugs. Family History:   Family History   Problem Relation Age of Onset   [facility] Cancer Father         lung    Hypertension Father     High Blood Pressure Father     Hypertension Brother     High Blood Pressure Brother     Hypertension Mother    [facility] Other Mother         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/2009    Heart Disease Mother     High Blood Pressure Mother     Cancer Paternal Grandmother     Hypertension Other         aunt    Stroke Maternal Grandfather     Cancer Maternal Aunt         breast    High Blood Pressure Maternal Aunt     Cancer Paternal Aunt         lung    Cancer Paternal Uncle         brain    Heart Disease Maternal Grandmother     High Blood Pressure Maternal Grandmother     Heart Disease Paternal Grandfather     Diabetes Neg Hx     Osteoporosis Neg Hx     Thyroid Disease Neg Hx     Anesth Problems Neg Hx     Malig Hyperten Neg Hx     Hypotension Neg Hx     Malig Hypertherm Neg Hx     Pseudochol. Deficiency Neg Hx        Home Medications:  Prior to Admission medications    Medication Sig Start Date End Date Taking?  Authorizing Provider   furosemide (LASIX) 40 MG tablet 0.5 tablets See Admin Instructions TAKE ONE TABLET BY MOUTH PRN 5/2/22  Yes HELEN Erazo - CNS   spironolactone (ALDACTONE) 25 MG tablet TAKE ONE TABLET BY MOUTH DAILY 4/11/22  Yes HELEN Govea - CNP   topiramate (TOPAMAX) 50 MG tablet Take 1 tablet by mouth 2 times daily 12/20/21  Yes Cecilio Murray MD   ezetimibe (ZETIA) 10 MG tablet TAKE ONE TABLET BY MOUTH DAILY 9/13/21  Yes Ada Salmeron MD   pravastatin (PRAVACHOL) 10 MG tablet Take 1 tablet by mouth daily 8/16/21  Yes Renea Davis MD   potassium chloride (KLOR-CON M) 10 MEQ extended release tablet TAKE TWO TABLETS BY MOUTH DAILY 8/6/21  Yes Renea Dvais MD   carvedilol (COREG) 6.25 MG tablet TAKE ONE TABLET BY MOUTH TWICE A DAY WITH MEALS 7/8/21  Yes Renea Davis MD   midodrine (PROAMATINE) 2.5 MG tablet Take 1 tablet by mouth three times a week On Mon, Wed and Fri. 6/25/21  Yes Renea Davis MD   albuterol sulfate  (90 Base) MCG/ACT inhaler Inhale 2 puffs into the lungs every 6 hours as needed 7/30/20  Yes Historical Provider, MD   fenofibrate (TRICOR) 145 MG tablet Take 1 tablet by mouth daily    Yes Historical Provider, MD   Multiple Vitamins-Minerals (MULTIVITAMIN ADULTS) TABS MULTIVITAMIN ADULTS TABS 11/13/20  Yes Historical Provider, MD   nortriptyline (PAMELOR) 10 MG capsule Take 10 mg by mouth 2 times daily   Yes Historical Provider, MD   Liraglutide (VICTOZA) 18 MG/3ML SOPN SC injection Inject 1.8 mg into the skin daily    Yes Historical Provider, MD   Cholecalciferol (VITAMIN D3) 50 MCG (2000 UT) CAPS Take 2,000 Units by mouth daily  Patient not taking: Reported on 5/2/2022    Historical Provider, MD        Allergies:  Lisinopril, Statins, Dilaudid [hydromorphone hcl], and Skelaxin [metaxalone]     Review of Systems:   · Constitutional: there has been no unanticipated weight loss. There's been no change in energy level, sleep pattern, or activity level. · Eyes: No visual changes or diplopia. No scleral icterus. · ENT: No Headaches, hearing loss or vertigo. No mouth sores or sore throat. · Cardiovascular: Reviewed in HPI  · Respiratory: No cough or wheezing, no sputum production. No hematemesis. · Gastrointestinal: No abdominal pain, appetite loss, blood in stools. No change in bowel or bladder habits. · Genitourinary: No dysuria, trouble voiding, or hematuria. · Musculoskeletal:  No gait disturbance, weakness or joint complaints.   · Integumentary: No rash or pruritis. · Neurological: No headache, diplopia, change in muscle strength, numbness or tingling. No change in gait, balance, coordination, mood, affect, memory, mentation, behavior. · Psychiatric: No anxiety, no depression. · Endocrine: No malaise, fatigue or temperature intolerance. No excessive thirst, fluid intake, or urination. No tremor. · Hematologic/Lymphatic: No abnormal bruising or bleeding, blood clots or swollen lymph nodes. · Allergic/Immunologic: No nasal congestion or hives. Physical Examination:    Vitals:    05/02/22 1333   BP: 96/70   Pulse: 86   SpO2: 96%   Weight: 161 lb 14.4 oz (73.4 kg)   Height: 5' 5\" (1.651 m)        Constitutional and General Appearance: Warm and dry, no apparent distress, normal coloration  HEENT:  Normocephalic, atraumatic  Respiratory:  · Normal excursion and expansion without use of accessory muscles  · Resp Auscultation: Normal breath sounds without dullness  Cardiovascular:  · The apical impulses not displaced  · Heart tones are crisp and normal  · JVP normal cm H2O  · Regular rate and rhythm  · Peripheral pulses are symmetrical and full  · There is no clubbing, cyanosis of the extremities.   · no edema  · Pedal Pulses: 2+ and equal   Abdomen:  · No masses or tenderness  · Liver/Spleen: No Abnormalities Noted  Neurological/Psychiatric:  · Alert and oriented in all spheres  · Moves all extremities well  · Exhibits normal gait balance and coordination  · No abnormalities of mood, affect, memory, mentation, or behavior are noted    Lab Data:    CBC:   Lab Results   Component Value Date    WBC 4.1 11/22/2021    WBC 4.5 06/04/2021    WBC 5.7 03/18/2021    RBC 4.17 11/22/2021    RBC 4.19 06/04/2021    RBC 4.43 03/18/2021    HGB 11.9 11/22/2021    HGB 12.5 06/04/2021    HGB 12.8 03/18/2021    HCT 35.9 11/22/2021    HCT 36.2 06/04/2021    HCT 38.1 03/18/2021    MCV 86.2 11/22/2021    MCV 86.4 06/04/2021    MCV 85.9 03/18/2021    RDW 12.7 11/22/2021    RDW 12.3 06/04/2021    RDW 12.2 03/18/2021     11/22/2021     06/04/2021     03/18/2021     BMP:  Lab Results   Component Value Date     11/29/2021     11/22/2021     06/04/2021    K 3.6 11/29/2021    K 4.2 11/22/2021    K 3.9 06/04/2021    K 3.9 03/18/2021     11/29/2021     11/22/2021    CL 99 06/04/2021    CO2 25 11/29/2021    CO2 25 11/22/2021    CO2 26 06/04/2021    PHOS 3.5 06/25/2019    PHOS 3.5 12/01/2009    BUN 22 11/29/2021    BUN 22 11/22/2021    BUN 22 06/04/2021    CREATININE 1.2 11/29/2021    CREATININE 1.1 11/22/2021    CREATININE 1.1 06/04/2021     BNP:   Lab Results   Component Value Date    PROBNP 63 11/29/2021    PROBNP 327 11/22/2021    PROBNP 29 06/04/2021     Cardiac Imaging:  Stress test 10/5/2021  Summary    Normal myocardial perfusion study.    Normal myocardial perfusion     Echo 11/30/2020  Technically limited due to poor acoustical windows.   Normal left ventricle size, wall thickness and systolic function with an estimated ejection fraction of 55-60%.   No regional wall motion abnormalities are seen.   Indeterminate diastolic function.   Unable to estimate pulmonary artery pressure secondary to incomplete TR jet envelope.   The right ventricle is normal in size and function     Cardiac Cath 5/8/14  LM-normal  LAD-normal  Cx- normal  RCA-normal, dominant   LVEF-55%     Hemodynamics: RA mean 4  RV 32/0  PA 32/11 mean 19  PAW 10/9 mean 5  Thermal: C.O. 4.06 and C.I. 2.05  Glenn: C.O. 4.96 and C.I. 2.51     Assessment:    1. Chest pain at rest    2. SOB (shortness of breath)    3. Diastolic CHF, chronic ([address]    4. Chronic hypotension        Plan:   Patient Instructions   1. Check blood work today  2. Continue all current medications  3.   RTO June 15th with Dr Aleyda Rojo along with an echo    Discussed with Dr Aleyda Rojo    I appreciate the opportunity of cooperating in the care of this individual.    Fallon Norris, HELEN - CNS, CNS, 5/2/2022, 2:22 PM

## 2022-06-09 ENCOUNTER — HOSPITAL ENCOUNTER (OUTPATIENT)
Age: 57
Discharge: HOME | End: 2022-06-09
Payer: COMMERCIAL

## 2022-06-09 DIAGNOSIS — R06.02 SOB (SHORTNESS OF BREATH): ICD-10-CM

## 2022-06-09 DIAGNOSIS — I50.32 DIASTOLIC CHF, CHRONIC (HCC): ICD-10-CM

## 2022-06-09 LAB
ANION GAP SERPL CALCULATED.3IONS-SCNC: 15 MMOL/L (ref 3–16)
BUN BLDV-MCNC: 23 MG/DL (ref 7–20)
CALCIUM SERPL-MCNC: 10.1 MG/DL (ref 8.3–10.6)
CHLORIDE BLD-SCNC: 102 MMOL/L (ref 99–110)
CO2: 24 MMOL/L (ref 21–32)
CREAT SERPL-MCNC: 1.1 MG/DL (ref 0.6–1.1)
GFR AFRICAN AMERICAN: >60
GFR NON-AFRICAN AMERICAN: 51
GLUCOSE BLD-MCNC: 117 MG/DL (ref 70–99)
POTASSIUM SERPL-SCNC: 3.7 MMOL/L (ref 3.5–5.1)
PRO-BNP: 55 PG/ML (ref 0–124)
SODIUM BLD-SCNC: 141 MMOL/L (ref 136–145)

## 2022-06-09 PROCEDURE — 83880 ASSAY OF NATRIURETIC PEPTIDE: CPT

## 2022-06-09 PROCEDURE — 80048 BASIC METABOLIC PNL TOTAL CA: CPT

## 2022-06-09 PROCEDURE — 36415 COLL VENOUS BLD VENIPUNCTURE: CPT

## 2022-06-14 NOTE — PROGRESS NOTES
[address]   Advanced Heart Failure/Pulmonary Hypertension  Cardiac Follow up       Cleveland Green  YOB: 1965    Date of Visit:  6/15/22    Chief Complaint   Patient presents with    Follow-up    Congestive Heart Failure     History of Present Illness:  Cleveland Green is a 55 y.o. female with past medical history significant for dCHF, PHTN, NIRALI, HTN, and CKD. Valsartan was discontinued in May 2018 secondary symptomatic hypotension with it's use (97/52). She sees Dr. Yohannes Flores neurology for headaches. 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 Evangelina Holloway at [facility]. 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 She has been seeing Dr. Yohannes Flores for headches and is taking Topiramate 50mg  one in the AM and two tabs at HS; (history of congenital migraines -- may need brain surgery to insert fat). He wanted to prescribe Verapamil for headaches, but her BP runs low, and she takes Midodrine. She was seen in [facility] ER 3/18/2021 with chest discomfort, was s/p Covid vaccine x 24hrs. Work up negative for acute disorders, and her pain self-resolved while there. She was discharged home in stable condition. She did not have any problems after the second vaccine. She continued to have chest pain. She had a negative stress test on 10/5/22    Today, she is here for follow up. She has continued with chest pain in May and saw Marquetta Gosselin for an urgent visit. She had an EGD 2-3 months ago and esophagus stretched. Her EKG was NSR at that visit. Her Echo today shows EF 55-60%. She states she had a good vacation. She has stayed clear of Covid. Denies chest pain, shortness of breath, syncope, orthopnea, palpitations, or dizziness. She denies any chest pain currently.        NYHA Class II    Allergies   Allergen Reactions    Lisinopril Other (See Comments)     cough    Heart failure, diastolic (HCC)     Hemoptysis 2012    Hyperlipidemia     Hypertension     Nausea & vomiting     PONV (postoperative nausea and vomiting)     Pulmonary hypertension (HCC)     Pulmonary hypertension due to left ventricular diastolic dysfunction ([address]     Renal failure     Sleep apnea     SUPPOSED TO USE CPAP    Vertigo      Past Surgical History:   Procedure Laterality Date    BREAST CYST EXCISION Right 2017    end     BREAST REDUCTION SURGERY      BREAST SURGERY      breast reduction    BRONCHOSCOPY  12    BRONCHOSCOPY  2016    w/ washing     SECTION      CHOLECYSTECTOMY      HERNIA REPAIR      HYSTERECTOMY (CERVIX STATUS UNKNOWN)  2015    LAPAROSCOPIC APPENDECTOMY  6/3/11    LAPAROSCOPY  2011    operative, with lysis of adhesions    NEUROMA SURGERY      OTHER SURGICAL HISTORY      3 rt heart caths and 2 left heart caths    SALPINGO-OOPHORECTOMY  6/3/11    Laparoscopic Right    SHOULDER SURGERY      SHOULDER SURGERY Right 2018    UPPER GASTROINTESTINAL ENDOSCOPY  11    biopsy    UPPER GASTROINTESTINAL ENDOSCOPY  2011    EUS     Family History   Problem Relation Age of Onset    Cancer Father         lung    Hypertension Father     High Blood Pressure Father     Hypertension Brother     High Blood Pressure Brother     Hypertension Mother    [address] Other Mother         abdominal aortic aneursym - passes 2009    Heart Disease Mother     High Blood Pressure Mother     Cancer Paternal Grandmother     Hypertension Other         aunt    Stroke Maternal Grandfather     Cancer Maternal Aunt         breast    High Blood Pressure Maternal Aunt     Cancer Paternal Aunt         lung    Cancer Paternal Uncle         brain    Heart Disease Maternal Grandmother     High Blood Pressure Maternal Grandmother     Heart Disease Paternal Grandfather     Diabetes Neg Hx     Osteoporosis Neg Hx     Thyroid Not on file    Number of Places Lived in the Last Year: Not on file    Unstable Housing in the Last Year: Not on file       Review of Systems:   · Constitutional: there has been no unanticipated weight loss. There's been no change in energy level, sleep pattern, or activity level. · Eyes: No visual changes or diplopia. No scleral icterus. · ENT: No Headaches, hearing loss or vertigo. No mouth sores or sore throat. · Cardiovascular: Reviewed in HPI  · Respiratory: No cough or wheezing, no sputum production. No hematemesis. CHUNG  · Gastrointestinal: No abdominal pain, appetite loss, blood in stools. No change in bowel or bladder habits. · Genitourinary: No dysuria, trouble voiding, or hematuria. · Musculoskeletal:  No gait disturbance, weakness or joint complaints. · Integumentary: No rash or pruritis. · Neurological: Positive headaches, No diplopia, change in muscle strength, numbness or tingling. No change in gait, balance, coordination, mood, affect, memory, mentation, behavior. · Psychiatric: No anxiety, no depression. · Endocrine: No malaise, fatigue or temperature intolerance. No excessive thirst, fluid intake, or urination. No tremor. · Hematologic/Lymphatic: No abnormal bruising or bleeding, blood clots or swollen lymph nodes. · Allergic/Immunologic: No nasal congestion or hives. Physical Examination:    Vitals:    06/15/22 1447   BP: 102/60   Site: Right Upper Arm   Position: Sitting   Cuff Size: Medium Adult   Pulse: 83   SpO2: 98%   Weight: 162 lb (73.5 kg)   Height: 5' 5\" (1.651 m)     Body mass index is 26.96 kg/m².      Wt Readings from Last 3 Encounters:   06/15/22 162 lb (73.5 kg)   05/02/22 161 lb 14.4 oz (73.4 kg)   12/20/21 159 lb (72.1 kg)     BP Readings from Last 3 Encounters:   06/15/22 102/60   05/02/22 96/70   12/20/21 96/71      Constitutional and General Appearance:     WD/WN in NAD  HEENT:  NC/AT  Skin:  Warm, dry  Respiratory:  · Normal excursion and expansion without use of accessory muscles  · Resp Auscultation: Normal breath sounds without dullness  Cardiovascular:  · The apical impulses not displaced  · Heart tones are crisp and normal  · Cervical veins are not engorged  · The carotid upstroke is normal in amplitude and contour without delay or bruit  · JVP normal  RRR with nl S1 and S2 without m,r,g.  · Peripheral pulses are symmetrical and full. · There is no clubbing, cyanosis of the extremities. · No edema. · Femoral Arteries: 2+ and equal.  · Pedal Pulses: 2+ and equal.   Neck:  · JVP less than 8 cm H2O  · No thyromegaly  Abdomen:  · No masses or tenderness  · Liver/Spleen: No Abnormalities Noted  Neurological/Psychiatric:  · Alert and oriented in all spheres  · Moves all extremities well  · Exhibits normal gait balance and coordination  · No abnormalities of mood, affect, memory, mentation, or behavior are noted    Diagnostics:  Echo 6/15/22  Normal left ventricle size, wall thickness and systolic function with an   estimated ejection fraction of 55-60%. No regional wall motion abnormalities are seen. Normal diastolic function. E/e' = 7.0. Trivial tricuspid regurgitation. RVSP = 19 mmHG. Normal function of all valves. Stress Myoview 10/5/21  Conclusions        Summary    Normal myocardial perfusion study.    Normal myocardial perfusion.       Stress Protocols        Resting ECG    Normal sinus rhythm. Echo 11/30/2020  Technically limited due to poor acoustical windows. Normal left ventricle size, wall thickness and systolic function with an estimated ejection fraction of 55-60%. No regional wall motion abnormalities are seen. Indeterminate diastolic function. Unable to estimate pulmonary artery pressure secondary to incomplete TR jet envelope. The right ventricle is normal in size and function.     Echo 3/7/19  Summary   -Normal left ventricle size, wall thickness, and systolic function with an estimated ejection fraction of 55-60% visually,

## 2022-06-15 ENCOUNTER — HOSPITAL ENCOUNTER (OUTPATIENT)
Dept: NON INVASIVE DIAGNOSTICS | Age: 57
Discharge: HOME | End: 2022-06-15
Payer: COMMERCIAL

## 2022-06-15 ENCOUNTER — OFFICE VISIT (OUTPATIENT)
Dept: CARDIOLOGY CLINIC | Age: 57
End: 2022-06-15
Payer: COMMERCIAL

## 2022-06-15 VITALS
BODY MASS INDEX: 26.99 KG/M2 | HEIGHT: 65 IN | DIASTOLIC BLOOD PRESSURE: 60 MMHG | WEIGHT: 162 LBS | HEART RATE: 83 BPM | OXYGEN SATURATION: 98 % | SYSTOLIC BLOOD PRESSURE: 102 MMHG

## 2022-06-15 DIAGNOSIS — E55.9 VITAMIN D DEFICIENCY: ICD-10-CM

## 2022-06-15 DIAGNOSIS — R06.02 SOB (SHORTNESS OF BREATH): Primary | ICD-10-CM

## 2022-06-15 DIAGNOSIS — I10 ESSENTIAL HYPERTENSION: ICD-10-CM

## 2022-06-15 DIAGNOSIS — R07.9 CHEST PAIN, UNSPECIFIED TYPE: ICD-10-CM

## 2022-06-15 DIAGNOSIS — R06.02 SOB (SHORTNESS OF BREATH): ICD-10-CM

## 2022-06-15 DIAGNOSIS — I50.32 DIASTOLIC CHF, CHRONIC (HCC): Primary | ICD-10-CM

## 2022-06-15 DIAGNOSIS — E78.00 PURE HYPERCHOLESTEROLEMIA: ICD-10-CM

## 2022-06-15 LAB
LV EF: 58 %
LVEF MODALITY: NORMAL

## 2022-06-15 PROCEDURE — 93306 TTE W/DOPPLER COMPLETE: CPT

## 2022-06-15 PROCEDURE — 99214 OFFICE O/P EST MOD 30 MIN: CPT | Performed by: INTERNAL MEDICINE

## 2022-06-15 RX ORDER — PRAVASTATIN SODIUM 10 MG
10 TABLET ORAL DAILY
Qty: 90 TABLET | Refills: 3 | Status: SHIPPED | OUTPATIENT
Start: 2022-06-15

## 2022-06-15 RX ORDER — CARVEDILOL 6.25 MG/1
6.25 TABLET ORAL 2 TIMES DAILY
Qty: 180 TABLET | Refills: 3 | Status: SHIPPED | OUTPATIENT
Start: 2022-06-15

## 2022-06-15 RX ORDER — EZETIMIBE 10 MG/1
10 TABLET ORAL DAILY
Qty: 90 TABLET | Refills: 3 | Status: SHIPPED | OUTPATIENT
Start: 2022-06-15

## 2022-06-15 RX ORDER — MIDODRINE HYDROCHLORIDE 2.5 MG/1
2.5 TABLET ORAL
Qty: 60 TABLET | Refills: 3 | Status: SHIPPED | OUTPATIENT
Start: 2022-06-15 | End: 2022-08-29

## 2022-06-15 RX ORDER — FUROSEMIDE 40 MG/1
TABLET ORAL
Qty: 30 TABLET | Refills: 5 | Status: SHIPPED | OUTPATIENT
Start: 2022-06-15

## 2022-06-15 RX ORDER — POTASSIUM CHLORIDE 750 MG/1
10 TABLET, EXTENDED RELEASE ORAL DAILY
Qty: 30 TABLET | Refills: 5 | Status: SHIPPED | OUTPATIENT
Start: 2022-06-15

## 2022-06-15 RX ORDER — SPIRONOLACTONE 25 MG/1
25 TABLET ORAL DAILY
Qty: 90 TABLET | Refills: 3 | Status: SHIPPED | OUTPATIENT
Start: 2022-06-15

## 2022-06-15 NOTE — PATIENT INSTRUCTIONS
Plan:  1. Continue same medications. 2.  Labs FASTING in 6 months  3.   See Dr. Gena Bedolla in 6 months

## 2022-06-27 DIAGNOSIS — G43.709 CHRONIC MIGRAINE W/O AURA W/O STATUS MIGRAINOSUS, NOT INTRACTABLE: ICD-10-CM

## 2022-06-28 RX ORDER — TOPIRAMATE 50 MG/1
TABLET, FILM COATED ORAL
Qty: 180 TABLET | Refills: 0 | Status: SHIPPED | OUTPATIENT
Start: 2022-06-28 | End: 2022-08-10 | Stop reason: SDUPTHER

## 2022-07-16 ENCOUNTER — HOSPITAL ENCOUNTER (OUTPATIENT)
Dept: WOMENS IMAGING | Age: 57
Discharge: HOME | End: 2022-07-16
Payer: COMMERCIAL

## 2022-07-16 VITALS — WEIGHT: 160 LBS | HEIGHT: 65 IN | BODY MASS INDEX: 26.66 KG/M2

## 2022-07-16 DIAGNOSIS — Z12.31 VISIT FOR SCREENING MAMMOGRAM: ICD-10-CM

## 2022-07-16 PROCEDURE — 77063 BREAST TOMOSYNTHESIS BI: CPT

## 2022-08-10 ENCOUNTER — OFFICE VISIT (OUTPATIENT)
Dept: NEUROLOGY | Age: 57
End: 2022-08-10
Payer: COMMERCIAL

## 2022-08-10 VITALS
SYSTOLIC BLOOD PRESSURE: 115 MMHG | HEIGHT: 65 IN | HEART RATE: 76 BPM | DIASTOLIC BLOOD PRESSURE: 65 MMHG | BODY MASS INDEX: 26.66 KG/M2 | WEIGHT: 160 LBS

## 2022-08-10 DIAGNOSIS — G43.709 CHRONIC MIGRAINE W/O AURA W/O STATUS MIGRAINOSUS, NOT INTRACTABLE: ICD-10-CM

## 2022-08-10 PROCEDURE — 99213 OFFICE O/P EST LOW 20 MIN: CPT | Performed by: PSYCHIATRY & NEUROLOGY

## 2022-08-10 RX ORDER — TOPIRAMATE 50 MG/1
TABLET, FILM COATED ORAL
Qty: 180 TABLET | Refills: 1 | Status: SHIPPED | OUTPATIENT
Start: 2022-08-10

## 2022-08-10 NOTE — PROGRESS NOTES
Chelsy Marquez   Neurology followup    Subjective:   CC/HP  History was obtained from the patient. Interval history:  Patient states that the headaches are reasonably well controlled. She gets occasional stress headache. No major side effects of topiramate. She has been following a strict diet and avoiding chocolate and nitrate-containing foods. Patient states that she had an MRI done at [facility] as follow-up for schwannoma removal.  It showed some scar tissue from the previous surgery. There was also prominence of the optic nerves and there was concern for possible pseudotumor cerebri. Patient was seen by ophthalmologist and there was no evidence of papilledema. She is doing reasonably well from a neurological standpoint. .  Detailed history:  Patient has chronic migraine headaches. No significant side effects of medication  Headaches are usually described as mainly in the left parietal occipital region and are throbbing. Bright light and loud noise used to make the headaches worse.       REVIEW OF SYSTEMS    Constitutional:  []   Chills   []  Fatigue   []  Fevers   []  Malaise   []  Weight loss     [x] Denies all of the above    Respiratory:   []  Cough    []  Shortness of breath         [x] Denies all of the above     Cardiovascular:   []  Chest pain    []  Exertional chest pressure/discomfort           [] Palpitations    []  Syncope     [x] Denies all of the above        Past Medical History:   Diagnosis Date    Abdominal pain 9/9/2010    Arthritis     Asthma     Chest pain 6/22/2011    CHF (congestive heart failure) (HCC)     Diastolic heart failure     5/17/10-heart worsening-Dr. Declan Perez    Dyspnea     Heart failure, diastolic [facility])     Hemoptysis 5/14/2012    Hyperlipidemia     Hypertension     Nausea & vomiting     PONV (postoperative nausea and vomiting)     Pulmonary hypertension ([address]     Pulmonary hypertension due to left ventricular diastolic dysfunction (HCC)     Renal failure Sleep apnea     SUPPOSED TO USE CPAP    Vertigo      Family History   Problem Relation Age of Onset    Hypertension Mother     Other Mother         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/2009    Heart Disease Mother     High Blood Pressure Mother     Cancer Father         lung    Hypertension Father     High Blood Pressure Father     Hypertension Brother     High Blood Pressure Brother     Heart Disease Maternal Grandmother     High Blood Pressure Maternal Grandmother     Stroke Maternal Grandfather     Cancer Paternal Grandmother     Heart Disease Paternal Grandfather     Breast Cancer Maternal Aunt     Cancer Maternal Aunt         breast    High Blood Pressure Maternal Aunt     Breast Cancer Paternal Aunt     Cancer Paternal Aunt         lung    Cancer Paternal Uncle         brain    Hypertension Other         aunt    Breast Cancer Paternal Cousin     Breast Cancer Paternal Cousin     Breast Cancer Paternal Cousin     Breast Cancer Paternal Cousin     Breast Cancer Paternal Cousin     Breast Cancer Paternal Cousin     Diabetes Neg Hx     Osteoporosis Neg Hx     Thyroid Disease Neg Hx     Anesth Problems Neg Hx     Malig Hyperten Neg Hx     Hypotension Neg Hx     Malig Hypertherm Neg Hx     Pseudochol. Deficiency Neg Hx      Social History     Socioeconomic History    Marital status:      Spouse name: None    Number of children: None    Years of education: None    Highest education level: None   Tobacco Use    Smoking status: Never    Smokeless tobacco: Never   Vaping Use    Vaping Use: Never used   Substance and Sexual Activity    Alcohol use: Not Currently     Alcohol/week: 0.0 standard drinks     Comment: rarely    Drug use: No    Sexual activity: Yes     Partners: Male        Objective:  Exam:  /65   Pulse 76   Ht 5' 5\" (1.651 m)   Wt 160 lb (72.6 kg)   BMI 26.63 kg/m²   This is a well-nourished patient in no acute distress  Patient is awake, alert and oriented x3.  Speech is normal.  Pupils are equal round reacting to light. Extraocular movements intact. Face symmetrical. Tongue midline. Motor exam shows normal symmetrical strength. Deep tendon reflexes decreased in the lower extremities. Plantar reflexes downgoing. Sensory exam shows decreased light touch in the distal lower extremities Coordination normal. Gait normal. No carotid bruit. No neck stiffness. Data :  LABS:  General Labs:    CBC:   Lab Results   Component Value Date/Time    WBC 4.1 11/22/2021 08:56 AM    RBC 4.17 11/22/2021 08:56 AM    HGB 11.9 11/22/2021 08:56 AM    HCT 35.9 11/22/2021 08:56 AM    MCV 86.2 11/22/2021 08:56 AM    MCH 28.6 11/22/2021 08:56 AM    MCHC 33.2 11/22/2021 08:56 AM    RDW 12.7 11/22/2021 08:56 AM     11/22/2021 08:56 AM    MPV 9.8 11/22/2021 08:56 AM     BMP:    Lab Results   Component Value Date/Time     06/09/2022 09:18 AM    K 3.7 06/09/2022 09:18 AM    K 3.9 03/18/2021 05:25 PM     06/09/2022 09:18 AM    CO2 24 06/09/2022 09:18 AM    BUN 23 06/09/2022 09:18 AM    LABALBU 4.3 11/22/2021 08:56 AM    CREATININE 1.1 06/09/2022 09:18 AM    CALCIUM 10.1 06/09/2022 09:18 AM    GFRAA >60 06/09/2022 09:18 AM    GFRAA >60 04/02/2013 11:58 AM    LABGLOM 51 06/09/2022 09:18 AM    GLUCOSE 117 06/09/2022 09:18 AM    GLUCOSE 192 08/19/2015 07:45 AM     RADIOLOGY REVIEW:  MRI brain    Impression :  Migraine headaches reasonably well controlled  Previous history of schwannoma and status post surgery  Diabetic neuropathy  Patient has titubation of the head but no other evidence of benign essential tremor. MRI brain did not show any posterior fossa lesion or cerebellar lesion. There was scar tissue from the previous surgery. There is persistent mild enlargement of the optic nerve sheath and a partial empty sella and the radiologist was concerned about possible idiopathic intracranial hypertension. Patient has been seen by ophthalmologist and there was no evidence of papilledema.       Plan

## 2022-08-29 RX ORDER — MIDODRINE HYDROCHLORIDE 2.5 MG/1
TABLET ORAL
Qty: 30 TABLET | Refills: 3 | Status: SHIPPED | OUTPATIENT
Start: 2022-08-29

## 2022-08-29 NOTE — TELEPHONE ENCOUNTER
Last OV:6/15/2022    Last Labs: BNP, BMP-6/9/2022, CMP-11/22/2021    Last Refill:6/15/2022    Next OV:12/7/2022

## 2022-11-08 ENCOUNTER — TELEPHONE (OUTPATIENT)
Dept: CARDIOLOGY CLINIC | Age: 57
End: 2022-11-08

## 2022-11-14 ENCOUNTER — TELEPHONE (OUTPATIENT)
Dept: CARDIOLOGY CLINIC | Age: 57
End: 2022-11-14

## 2022-11-14 NOTE — TELEPHONE ENCOUNTER
Pt called to inform the office that her employer, [employer] will be faxing over her [facility] paper to be filled. Pt is requesting that the office fax the paper back to [facility] (Fax:  [phone]) after filling it out.    Please advise

## 2022-11-21 ENCOUNTER — TELEPHONE (OUTPATIENT)
Dept: CARDIOLOGY CLINIC | Age: 57
End: 2022-11-21

## 2022-11-21 RX ORDER — FUROSEMIDE 40 MG/1
TABLET ORAL
Qty: 90 TABLET | Refills: 3 | Status: SHIPPED | OUTPATIENT
Start: 2022-11-21

## 2022-11-21 NOTE — TELEPHONE ENCOUNTER
Medication Refill    Medication needing refilled:  furosemide (LASIX) 40 MG tablet     Dosage of the medication:    How are you taking this medication (QD, BID, TID, QID, PRN): .5 tablet PRN    30 or 90 day supply called in: 90    When will you run out of your medication: 1 week    Which Pharmacy are we sending the medication to?:  [facility] [id number], [address] [phone]   [address]   Phone:  [phone]  Fax:  [phone]

## 2022-11-25 ENCOUNTER — TELEPHONE (OUTPATIENT)
Dept: CARDIOLOGY CLINIC | Age: 57
End: 2022-11-25

## 2022-11-25 NOTE — TELEPHONE ENCOUNTER
Left message for patient that there are lab orders in the [employer] system that were put in in June. Not sure if this all Dr Souleymane aLra wants. Also, patient lives in [facility]. If she is going somewhere for lab draw other than [email or web address] we need to know so we can fax orders there. Asked her to call back if she needs them faxed to another lab.

## 2022-11-25 NOTE — TELEPHONE ENCOUNTER
Pt called and states she is coming in tomorrow to have her labs drawn. Asking that we send the orders in so she can get them done. Please advise.

## 2022-11-26 ENCOUNTER — HOSPITAL ENCOUNTER (OUTPATIENT)
Age: 57
Discharge: HOME | End: 2022-11-26
Payer: COMMERCIAL

## 2022-11-26 DIAGNOSIS — I10 ESSENTIAL HYPERTENSION: ICD-10-CM

## 2022-11-26 DIAGNOSIS — R06.02 SOB (SHORTNESS OF BREATH): ICD-10-CM

## 2022-11-26 DIAGNOSIS — E55.9 VITAMIN D DEFICIENCY: ICD-10-CM

## 2022-11-26 DIAGNOSIS — E78.00 PURE HYPERCHOLESTEROLEMIA: ICD-10-CM

## 2022-11-26 DIAGNOSIS — I50.32 DIASTOLIC CHF, CHRONIC (HCC): ICD-10-CM

## 2022-11-26 LAB
A/G RATIO: 1.7 (ref 1.1–2.2)
ALBUMIN SERPL-MCNC: 4.5 G/DL (ref 3.4–5)
ALP BLD-CCNC: 78 U/L (ref 40–129)
ALT SERPL-CCNC: 9 U/L (ref 10–40)
ANION GAP SERPL CALCULATED.3IONS-SCNC: 14 MMOL/L (ref 3–16)
AST SERPL-CCNC: 19 U/L (ref 15–37)
BILIRUB SERPL-MCNC: 0.3 MG/DL (ref 0–1)
BUN BLDV-MCNC: 26 MG/DL (ref 7–20)
CALCIUM SERPL-MCNC: 10.1 MG/DL (ref 8.3–10.6)
CHLORIDE BLD-SCNC: 102 MMOL/L (ref 99–110)
CHOLESTEROL, TOTAL: 179 MG/DL (ref 0–199)
CO2: 24 MMOL/L (ref 21–32)
CREAT SERPL-MCNC: 1.2 MG/DL (ref 0.6–1.1)
GFR SERPL CREATININE-BSD FRML MDRD: 53 ML/MIN/{1.73_M2}
GLUCOSE BLD-MCNC: 113 MG/DL (ref 70–99)
HCT VFR BLD CALC: 36.2 % (ref 36–48)
HDLC SERPL-MCNC: 40 MG/DL (ref 40–60)
HEMOGLOBIN: 12.1 G/DL (ref 12–16)
LDL CHOLESTEROL CALCULATED: 113 MG/DL
MCH RBC QN AUTO: 28.3 PG (ref 26–34)
MCHC RBC AUTO-ENTMCNC: 33.4 G/DL (ref 31–36)
MCV RBC AUTO: 84.7 FL (ref 80–100)
PDW BLD-RTO: 12.9 % (ref 12.4–15.4)
PLATELET # BLD: 271 K/UL (ref 135–450)
PMV BLD AUTO: 10.2 FL (ref 5–10.5)
POTASSIUM SERPL-SCNC: 3.6 MMOL/L (ref 3.5–5.1)
PRO-BNP: 85 PG/ML (ref 0–124)
RBC # BLD: 4.28 M/UL (ref 4–5.2)
SODIUM BLD-SCNC: 140 MMOL/L (ref 136–145)
TOTAL PROTEIN: 7.1 G/DL (ref 6.4–8.2)
TRIGL SERPL-MCNC: 129 MG/DL (ref 0–150)
VITAMIN D 25-HYDROXY: 83.5 NG/ML
VLDLC SERPL CALC-MCNC: 26 MG/DL
WBC # BLD: 4.2 K/UL (ref 4–11)

## 2022-11-26 PROCEDURE — 83880 ASSAY OF NATRIURETIC PEPTIDE: CPT

## 2022-11-26 PROCEDURE — 80061 LIPID PANEL: CPT

## 2022-11-26 PROCEDURE — 82306 VITAMIN D 25 HYDROXY: CPT

## 2022-11-26 PROCEDURE — 36415 COLL VENOUS BLD VENIPUNCTURE: CPT

## 2022-11-26 PROCEDURE — 85027 COMPLETE CBC AUTOMATED: CPT

## 2022-11-26 PROCEDURE — 80053 COMPREHEN METABOLIC PANEL: CPT

## 2022-12-05 NOTE — PROGRESS NOTES
[facility]   Advanced Heart Failure/Pulmonary Hypertension  Cardiac Follow up       Meghan Pena  YOB: 1965    Date of Visit:  12/7/22    Chief Complaint   Patient presents with    Congestive Heart Failure       History of Present Illness:  Meghan Pena is a 65 y.o. female with past medical history significant for dCHF, PHTN, NIRALI, HTN, and CKD. Valsartan was discontinued in May 2018 secondary symptomatic hypotension with it's use (97/52). She sees Dr. Cynthia Mercado neurology for headaches. 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 Radha Trinh at [address] [place] [address]. 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 She has been seeing Dr. Cynthia Mercado for headches and is taking Topiramate 50mg  one in the AM and two tabs at HS; (history of congenital migraines -- may need brain surgery to insert fat). He wanted to prescribe Verapamil for headaches, but her BP runs low, and she takes Midodrine. She was seen in [facility] ER 3/18/2021 with chest discomfort, was s/p Covid vaccine x 24hrs. Work up negative for acute disorders, and her pain self-resolved while there. She was discharged home in stable condition. She did not have any problems after the second vaccine. She continued to have chest pain. She had a negative stress test on 10/5/22  She has continued with chest pain in May and saw Brittaney Marshall for an urgent visit. Today, she is here for follow up. Her echo is stable with EF of 55-60% in June 2022. She states she went on a cruise and got Covid. Her  got a fever and cough and a very sore throat. She got Covid a few days later. She states she was weak and had the worst headache of her life. She lost 20 more pounds on Mounjaro. She has occasional SOB. She has been taking Lasix a few days a week. Her last A1C was 6.4 on 9/2022. She takes [facility] for her diabetes. Denies chest pain, shortness of breath, syncope, orthopnea, palpitations, or dizziness. NYHA Class II    Allergies   Allergen Reactions    Lisinopril Other (See Comments)     cough    Statins Other (See Comments)     Light-headedness, cp, fast HR    Dilaudid [Hydromorphone Hcl] Nausea And Vomiting    Skelaxin [Metaxalone] Palpitations     Heart beats fast     Current Outpatient Medications   Medication Sig Dispense Refill    Tirzepatide (MOUNJARO) 5 MG/0.5ML SOPN SC injection Inject 5 mg into the skin once a week      furosemide (LASIX) 40 MG tablet TAKE ONE TABLET BY MOUTH DAILY (Patient taking differently: Take 20 mg by mouth as needed (for swelling or sob)) 90 tablet 3    midodrine (PROAMATINE) 2.5 MG tablet TAKE ONE TABLET BY MOUTH THREE TIMES PER WEEK ON MONDAY, WEDNESDAY, AND FRIDAY 30 tablet 3    topiramate (TOPAMAX) 50 MG tablet TAKE ONE TABLET BY MOUTH TWICE A  tablet 1    carvedilol (COREG) 6.25 MG tablet Take 1 tablet by mouth 2 times daily (Patient taking differently: Take 3.125 mg by mouth 2 times daily) 180 tablet 3    ezetimibe (ZETIA) 10 MG tablet Take 1 tablet by mouth daily 90 tablet 3    potassium chloride (KLOR-CON M) 10 MEQ extended release tablet Take 1 tablet by mouth daily 30 tablet 5    pravastatin (PRAVACHOL) 10 MG tablet Take 1 tablet by mouth daily 90 tablet 3    spironolactone (ALDACTONE) 25 MG tablet Take 1 tablet by mouth daily 90 tablet 3    fenofibrate (TRICOR) 145 MG tablet Take 1 tablet by mouth daily       Multiple Vitamins-Minerals (MULTIVITAMIN ADULTS) TABS MULTIVITAMIN ADULTS TABS      nortriptyline (PAMELOR) 10 MG capsule Take 10 mg by mouth 2 times daily       No current facility-administered medications for this visit.        Past Medical History:   Diagnosis Date    Abdominal pain 9/9/2010    Arthritis     Asthma     Chest pain 6/22/2011    CHF (congestive heart failure) (HCC)     Diastolic heart failure     5/17/10-heart worsening-Dr. Carol Bowling    Dyspnea Heart failure, diastolic (HCC)     Hemoptysis 2012    Hyperlipidemia     Hypertension     Nausea & vomiting     PONV (postoperative nausea and vomiting)     Pulmonary hypertension ([address]     Pulmonary hypertension due to left ventricular diastolic dysfunction (HCC)     Renal failure     Sleep apnea     SUPPOSED TO USE CPAP    Vertigo      Past Surgical History:   Procedure Laterality Date    BREAST CYST EXCISION Right 2017    end of [address]      breast reduction    BRONCHOSCOPY  13    BRONCHOSCOPY  2016    w/ washing     SECTION      CHOLECYSTECTOMY      HERNIA REPAIR      HYSTERECTOMY (CERVIX STATUS UNKNOWN)  2015    LAPAROSCOPIC APPENDECTOMY  6/3/11    LAPAROSCOPY  2011    operative, with lysis of adhesions    NEUROMA SURGERY      OTHER SURGICAL HISTORY      3 rt heart caths and 2 left heart caths    SALPINGO-OOPHORECTOMY  6/3/11    Laparoscopic Right    SHOULDER SURGERY      SHOULDER SURGERY Right 2018    UPPER GASTROINTESTINAL ENDOSCOPY  11    biopsy    UPPER GASTROINTESTINAL ENDOSCOPY  2011    EUS     Family History   Problem Relation Age of Onset    Hypertension Mother     Other Mother         abdominal aortic aneursym - passes 2009    Heart Disease Mother     High Blood Pressure Mother     Cancer Father         lung    Hypertension Father     High Blood Pressure Father     Hypertension Brother     High Blood Pressure Brother     Heart Disease Maternal Grandmother     High Blood Pressure Maternal Grandmother     Stroke Maternal Grandfather     Cancer Paternal Grandmother     Heart Disease Paternal Grandfather     Breast Cancer Maternal Aunt     Cancer Maternal Aunt         breast    High Blood Pressure Maternal Aunt     Breast Cancer Paternal Aunt     Cancer Paternal Aunt         lung    Cancer Paternal Uncle         brain    Hypertension Other         aunt    Breast Cancer Paternal Cousin     Breast Cancer Paternal Cousin     Breast Cancer Paternal Cousin     Breast Cancer Paternal Cousin     Breast Cancer Paternal Cousin     Breast Cancer Paternal Cousin     Diabetes Neg Hx     Osteoporosis Neg Hx     Thyroid Disease Neg Hx     Anesth Problems Neg Hx     Malig Hyperten Neg Hx     Hypotension Neg Hx     Malig Hypertherm Neg Hx     Pseudochol. Deficiency Neg Hx      Social History     Socioeconomic History    Marital status:      Spouse name: Not on file    Number of children: Not on file    Years of education: Not on file    Highest education level: Not on file   Occupational History    Not on file   Tobacco Use    Smoking status: Never    Smokeless tobacco: Never   Vaping Use    Vaping Use: Never used   Substance and Sexual Activity    Alcohol use: Not Currently     Alcohol/week: 0.0 standard drinks     Comment: rarely    Drug use: No    Sexual activity: Yes     Partners: Male   Other Topics Concern    Not on file   Social History Narrative    Not on file     Social Determinants of Health     Financial Resource Strain: Not on file   Food Insecurity: Not on file   Transportation Needs: Not on file   Physical Activity: Not on file   Stress: Not on file   Social Connections: Not on file   Intimate Partner Violence: Not on file   Housing Stability: Not on file       Review of Systems:   Constitutional: there has been no unanticipated weight loss. There's been no change in energy level, sleep pattern, or activity level. Eyes: No visual changes or diplopia. No scleral icterus. ENT: No Headaches, hearing loss or vertigo. No mouth sores or sore throat. Cardiovascular: Reviewed in HPI  Respiratory: No cough or wheezing, no sputum production. No hematemesis. CHUNG  Gastrointestinal: No abdominal pain, appetite loss, blood in stools. No change in bowel or bladder habits. Genitourinary: No dysuria, trouble voiding, or hematuria.   Musculoskeletal:  No gait disturbance, weakness or joint complaints. Integumentary: No rash or pruritis. Neurological: Positive headaches, No diplopia, change in muscle strength, numbness or tingling. No change in gait, balance, coordination, mood, affect, memory, mentation, behavior. Psychiatric: No anxiety, no depression. Endocrine: No malaise, fatigue or temperature intolerance. No excessive thirst, fluid intake, or urination. No tremor. Hematologic/Lymphatic: No abnormal bruising or bleeding, blood clots or swollen lymph nodes. Allergic/Immunologic: No nasal congestion or hives. Physical Examination:    Vitals:    12/07/22 1014   BP: 88/64   Pulse: 86   SpO2: 98%   Weight: 145 lb 9.6 oz (66 kg)   Height: 5' 5\" (1.651 m)       Body mass index is 24.23 kg/m². Wt Readings from Last 3 Encounters:   12/07/22 145 lb 9.6 oz (66 kg)   08/10/22 160 lb (72.6 kg)   07/16/22 160 lb (72.6 kg)     BP Readings from Last 3 Encounters:   12/07/22 88/64   08/10/22 115/65   06/15/22 102/60      Constitutional and General Appearance:   Appears healthy  WD/WN in NAD  HEENT:  NC/AT  Skin:  Warm, dry  Respiratory:  Normal excursion and expansion without use of accessory muscles  Resp Auscultation: Normal breath sounds without dullness  Cardiovascular: The apical impulses not displaced  Heart tones are crisp and normal  Cervical veins are not engorged  The carotid upstroke is normal in amplitude and contour without delay or bruit  JVP normal  RRR with nl S1 and S2 without m,r,g.  Peripheral pulses are symmetrical and full. There is no clubbing, cyanosis of the extremities. No edema.   Femoral Arteries: 2+ and equal.  Pedal Pulses: 2+ and equal.   Neck:  JVP less than 8 cm H2O  No thyromegaly  Abdomen:  No masses or tenderness  Liver/Spleen: No Abnormalities Noted  Neurological/Psychiatric:  Alert and oriented in all spheres  Moves all extremities well  Exhibits normal gait balance and coordination  No abnormalities of mood, affect, memory, mentation, or behavior are noted    Diagnostics:  Echo 6/15/22  Normal left ventricle size, wall thickness and systolic function with an   estimated ejection fraction of 55-60%. No regional wall motion abnormalities are seen. Normal diastolic function. E/e' = 7.0. Trivial tricuspid regurgitation. RVSP = 19 mmHG. Normal function of all valves. Stress Myoview 10/5/21  Conclusions        Summary    Normal myocardial perfusion study. Normal myocardial perfusion. Stress Protocols        Resting ECG    Normal sinus rhythm. Echo 11/30/2020  Technically limited due to poor acoustical windows. Normal left ventricle size, wall thickness and systolic function with an estimated ejection fraction of 55-60%. No regional wall motion abnormalities are seen. Indeterminate diastolic function. Unable to estimate pulmonary artery pressure secondary to incomplete TR jet envelope. The right ventricle is normal in size and function. Echo 3/7/19  Summary   -Normal left ventricle size, wall thickness, and systolic function with an estimated ejection fraction of 55-60% visually, 58% by 3D HM and 55% by 3D FV. -No regional wall motion abnormalities are seen.   -Trivial pulmonic regurgitation present.   -Normal diastolic function. Avg. E/e'=7.75      Echo 8/25/2017   Summary   Normal left ventricle size, wall thickness and systolic function with an   estimated ejection fraction of 55-60%. Trivial tricuspid regurgitation. Echo 8/26/2016  Summary  Normal left ventricle size, wall thickness and systolic function with an EF of 55%. Reversal of E/A inflow velocities across the mitral valve suggesting impaired left ventricular relaxation.      Cardiac Cath 5/8/14  LM-normal  LAD-normal  Cx- normal  RCA-normal, dominant   LVEF-55%     Hemodynamics: RA mean 4  RV 32/0  PA 32/11 mean 19  PAW 10/9 mean 5  Thermal: C.O. 4.06 and C.I. 2.05  Glenn: C.O. 4.96 and C.I. 2.51      Labs were reviewed including labs from other hospital systems through [facility]. Cardiac testing was reviewed including echos, nuclear scans, cardiac catheterization, including from other hospital systems through [facility]. Assessment:  1. Diastolic CHF, chronic ([address]    2. SOB (shortness of breath)    3. Essential hypertension    4. Pure hypercholesterolemia    5. Mixed hyperlipidemia    6. Vitamin D deficiency      1. Diastolic CHF, chronic: Stable. Compensated by exam.     -ProBNP 29 (6/4/21)> 63, 55> 85  -Continue Spironolactone, Lasix 2-3 x wk, Coreg. Midodrine MWF  -Echo 6/15/22 > EF 55-60%  -ECHO 11/30/2020>EF 55-60%, indeterminate diastolic function. -ECHO 3/7/2019> EF 10-36%, normal diastolic function. 2. Essential hypertension: Stable  BP 88/64   Pulse 86   Ht 5' 5\" (1.651 m)   Wt 145 lb 9.6 oz (66 kg)   SpO2 98%   BMI 24.23 kg/m²       3. Pure hypercholesterolemia:  6/4/21> , TG 96, HDL 43,   -Continue Pravastatin & Zetia.   ~11/26/22 , HDL 40, , .      4. SOB (shortness of breath):  SOB with stairs. 5. Orthostatic hypotension, history of: Stable  Reported light headedness and dizziness when SBP < 90, DBP < 60 in the past  Taking Midodrine 2.5mg (3 times a week)  Reports she is staying hydrated. Plan:  1. Take Coreg 3.125mg twice a day   2. Labs in 6 months   3. See Dr. Winsome Phillips in 6 months and discuss next echo. Scribe's attestation: This note was scribed in the presence of America Garzon M.D. by Bella Charles RN     The scribe's documentation has been prepared under my direction and personally reviewed by me in its entirety. I confirm that the note above accurately reflects all work, treatment, procedures, and medical decision making performed by me. Time Based Itemization  A total of 30 minutes was spent on today's patient encounter.   If applicable, non-patient-facing activities:  ( x)Preparing to see the patient and reviewing records  ( ) Individual interpretation of results  ( ) Discussion or coordination of care with other health care professionals  ( x) Ordering of unique tests, medications, or procedures  ( x) Documentation within the EHR      I appreciate the opportunity of cooperating in the care of this patient.       Valente Orlando M.D., [facility]

## 2022-12-07 ENCOUNTER — OFFICE VISIT (OUTPATIENT)
Dept: CARDIOLOGY CLINIC | Age: 57
End: 2022-12-07

## 2022-12-07 VITALS
HEART RATE: 86 BPM | OXYGEN SATURATION: 98 % | BODY MASS INDEX: 24.26 KG/M2 | SYSTOLIC BLOOD PRESSURE: 88 MMHG | DIASTOLIC BLOOD PRESSURE: 64 MMHG | WEIGHT: 145.6 LBS | HEIGHT: 65 IN

## 2022-12-07 DIAGNOSIS — E78.2 MIXED HYPERLIPIDEMIA: ICD-10-CM

## 2022-12-07 DIAGNOSIS — I50.32 DIASTOLIC CHF, CHRONIC (HCC): Primary | ICD-10-CM

## 2022-12-07 DIAGNOSIS — R06.02 SOB (SHORTNESS OF BREATH): ICD-10-CM

## 2022-12-07 DIAGNOSIS — I10 ESSENTIAL HYPERTENSION: ICD-10-CM

## 2022-12-07 DIAGNOSIS — E78.00 PURE HYPERCHOLESTEROLEMIA: ICD-10-CM

## 2022-12-07 DIAGNOSIS — E55.9 VITAMIN D DEFICIENCY: ICD-10-CM

## 2022-12-07 RX ORDER — CARVEDILOL 3.12 MG/1
3.12 TABLET ORAL 2 TIMES DAILY
Qty: 180 TABLET | Refills: 3
Start: 2022-12-07

## 2022-12-07 RX ORDER — TIRZEPATIDE 5 MG/.5ML
5 INJECTION, SOLUTION SUBCUTANEOUS WEEKLY
COMMUNITY

## 2022-12-07 NOTE — PATIENT INSTRUCTIONS
Plan:  1. Take Coreg 3.125mg twice a day   2. Labs in 6 months   3. See Dr. Nova Kolb in 6 months and discuss next echo.

## 2023-01-11 ENCOUNTER — OFFICE VISIT (OUTPATIENT)
Dept: NEUROLOGY | Age: 58
End: 2023-01-11
Payer: COMMERCIAL

## 2023-01-11 VITALS
HEIGHT: 65 IN | SYSTOLIC BLOOD PRESSURE: 105 MMHG | WEIGHT: 138 LBS | DIASTOLIC BLOOD PRESSURE: 75 MMHG | BODY MASS INDEX: 22.99 KG/M2 | HEART RATE: 84 BPM

## 2023-01-11 DIAGNOSIS — G43.709 CHRONIC MIGRAINE W/O AURA W/O STATUS MIGRAINOSUS, NOT INTRACTABLE: ICD-10-CM

## 2023-01-11 PROCEDURE — 3074F SYST BP LT 130 MM HG: CPT | Performed by: PSYCHIATRY & NEUROLOGY

## 2023-01-11 PROCEDURE — 99213 OFFICE O/P EST LOW 20 MIN: CPT | Performed by: PSYCHIATRY & NEUROLOGY

## 2023-01-11 PROCEDURE — 3078F DIAST BP <80 MM HG: CPT | Performed by: PSYCHIATRY & NEUROLOGY

## 2023-01-11 RX ORDER — TOPIRAMATE 50 MG/1
TABLET, FILM COATED ORAL
Qty: 180 TABLET | Refills: 1 | Status: SHIPPED | OUTPATIENT
Start: 2023-01-11

## 2023-01-11 NOTE — PROGRESS NOTES
[facility]   Neurology followup    Subjective:   CC/HP  History was obtained from the patient. Interval history:  Patient states that the headaches are reasonably well controlled. She gets occasional stress headache. No major side effects of topiramate. She has been following a strict diet and avoiding chocolate and nitrate-containing foods. Patient states that she had an MRI done at [facility] as follow-up for schwannoma removal.  It showed some scar tissue from the previous surgery. There was also prominence of the optic nerves and there was concern for possible pseudotumor cerebri. Patient was seen by ophthalmologist and there was no evidence of papilledema. She is doing reasonably well from a neurological standpoint. Detailed history:  Patient has chronic migraine headaches. No significant side effects of medication  Headaches are usually described as mainly in the left parietal occipital region and are throbbing. Bright light and loud noise used to make the headaches worse.       REVIEW OF SYSTEMS    Constitutional:  []   Chills   []  Fatigue   []  Fevers   []  Malaise   []  Weight loss     [x] Denies all of the above    Respiratory:   []  Cough    []  Shortness of breath         [x] Denies all of the above     Cardiovascular:   []  Chest pain    []  Exertional chest pressure/discomfort           [] Palpitations    []  Syncope     [x] Denies all of the above        Past Medical History:   Diagnosis Date    Abdominal pain 9/9/2010    Arthritis     Asthma     Chest pain 6/22/2011    CHF (congestive heart failure) ([facility])     Diastolic heart failure     5/17/10-heart worsening-Dr. Florentino Covington    Dyspnea     Heart failure, diastolic ([facility])     Hemoptysis 5/14/2012    Hyperlipidemia     Hypertension     Nausea & vomiting     PONV (postoperative nausea and vomiting)     Pulmonary hypertension ([address]     Pulmonary hypertension due to left ventricular diastolic dysfunction (HCC)     Renal failure Sleep apnea     SUPPOSED TO USE CPAP    Vertigo      Family History   Problem Relation Age of Onset    Hypertension Mother     Other Mother         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/2009    Heart Disease Mother     High Blood Pressure Mother     Cancer Father         lung    Hypertension Father     High Blood Pressure Father     Hypertension Brother     High Blood Pressure Brother     Heart Disease Maternal Grandmother     High Blood Pressure Maternal Grandmother     Stroke Maternal Grandfather     Cancer Paternal Grandmother     Heart Disease Paternal Grandfather     Breast Cancer Maternal Aunt     Cancer Maternal Aunt         breast    High Blood Pressure Maternal Aunt     Breast Cancer Paternal Aunt     Cancer Paternal Aunt         lung    Cancer Paternal Uncle         brain    Hypertension Other         aunt    Breast Cancer Paternal Cousin     Breast Cancer Paternal Cousin     Breast Cancer Paternal Cousin     Breast Cancer Paternal Cousin     Breast Cancer Paternal Cousin     Breast Cancer Paternal Cousin     Diabetes Neg Hx     Osteoporosis Neg Hx     Thyroid Disease Neg Hx     Anesth Problems Neg Hx     Malig Hyperten Neg Hx     Hypotension Neg Hx     Malig Hypertherm Neg Hx     Pseudochol. Deficiency Neg Hx      Social History     Socioeconomic History    Marital status:      Spouse name: None    Number of children: None    Years of education: None    Highest education level: None   Tobacco Use    Smoking status: Never    Smokeless tobacco: Never   Vaping Use    Vaping Use: Never used   Substance and Sexual Activity    Alcohol use: Not Currently     Alcohol/week: 0.0 standard drinks     Comment: rarely    Drug use: No    Sexual activity: Yes     Partners: Male        Objective:  Exam:  /75   Pulse 84   Ht 5' 5\" (1.651 m)   Wt 138 lb (62.6 kg)   BMI 22.96 kg/m²   This is a well-nourished patient in no acute distress  Patient is awake, alert and oriented x3.  Speech is normal.  Pupils are equal round reacting to light. Extraocular movements intact. Face symmetrical. Tongue midline. Motor exam shows normal symmetrical strength. Deep tendon reflexes decreased in the lower extremities. Plantar reflexes downgoing. Sensory exam shows decreased light touch in the distal lower extremities Coordination normal. Gait normal. No carotid bruit. No neck stiffness. Data :  LABS:  General Labs:    CBC:   Lab Results   Component Value Date/Time    WBC 4.2 11/26/2022 08:17 AM    RBC 4.28 11/26/2022 08:17 AM    HGB 12.1 11/26/2022 08:17 AM    HCT 36.2 11/26/2022 08:17 AM    MCV 84.7 11/26/2022 08:17 AM    MCH 28.3 11/26/2022 08:17 AM    MCHC 33.4 11/26/2022 08:17 AM    RDW 12.9 11/26/2022 08:17 AM     11/26/2022 08:17 AM    MPV 10.2 11/26/2022 08:17 AM     BMP:    Lab Results   Component Value Date/Time     11/26/2022 08:17 AM    K 3.6 11/26/2022 08:17 AM    K 3.9 03/18/2021 05:25 PM     11/26/2022 08:17 AM    CO2 24 11/26/2022 08:17 AM    BUN 26 11/26/2022 08:17 AM    LABALBU 4.5 11/26/2022 08:17 AM    CREATININE 1.2 11/26/2022 08:17 AM    CALCIUM 10.1 11/26/2022 08:17 AM    GFRAA >60 06/09/2022 09:18 AM    GFRAA >60 04/02/2013 11:58 AM    LABGLOM 53 11/26/2022 08:17 AM    GLUCOSE 113 11/26/2022 08:17 AM    GLUCOSE 192 08/19/2015 07:45 AM     RADIOLOGY REVIEW:  MRI brain    Impression :  Migraine headaches reasonably well controlled  Previous history of schwannoma and status post surgery  Diabetic neuropathy  Patient has titubation of the head but no other evidence of benign essential tremor. MRI brain did not show any posterior fossa lesion or cerebellar lesion. There was scar tissue from the previous surgery. There is persistent mild enlargement of the optic nerve sheath and a partial empty sella and the radiologist was concerned about possible idiopathic intracranial hypertension. Patient has been seen by ophthalmologist and there was no evidence of papilledema.       Plan :  Discussed with patient  Continue to avoid chocolate and nitrite-containing foods  Continue topiramate 50 mg, twice daily  Side effects were discussed. Return in 6 months    Please note a portion of  this chart was generated using dragon dictation software. Although every effort was made to ensure the accuracy of this automated transcription, some errors in transcription may have occurred.

## 2023-02-07 ENCOUNTER — TELEPHONE (OUTPATIENT)
Dept: CARDIOLOGY CLINIC | Age: 58
End: 2023-02-07

## 2023-02-07 NOTE — LETTER
[facility] CARDIOLOGY[address]  Dept: [phone]  Dept Fax: [phone]      2023    Patient:Andria Jay  : 1965  DOS: 2023    To Whom it May Concern:    Bella Bob has been evaluated for cardiac clearance. Mrs. Jay is considered at a low risk for surgery. There is no further cardiac testing that could be done to lower the risk. Please let my office know if you have any questions or concerns.           Montrell Baires MD                           [facility]

## 2023-02-10 NOTE — TELEPHONE ENCOUNTER
Pt is calling with Dr Wall Stands office info.  Please fax clearance letter to     [place] - fax [phone]    Any questions please call  [phone]

## 2023-06-03 ENCOUNTER — HOSPITAL ENCOUNTER (OUTPATIENT)
Age: 58
Discharge: HOME | End: 2023-06-03
Payer: COMMERCIAL

## 2023-06-03 DIAGNOSIS — I50.32 DIASTOLIC CHF, CHRONIC (HCC): ICD-10-CM

## 2023-06-03 DIAGNOSIS — E55.9 VITAMIN D DEFICIENCY: ICD-10-CM

## 2023-06-03 DIAGNOSIS — I10 ESSENTIAL HYPERTENSION: ICD-10-CM

## 2023-06-03 DIAGNOSIS — R06.02 SOB (SHORTNESS OF BREATH): ICD-10-CM

## 2023-06-03 DIAGNOSIS — E78.2 MIXED HYPERLIPIDEMIA: ICD-10-CM

## 2023-06-03 LAB
25(OH)D3 SERPL-MCNC: 68.5 NG/ML
ALBUMIN SERPL-MCNC: 4.2 G/DL (ref 3.4–5)
ALBUMIN/GLOB SERPL: 1.6 {RATIO} (ref 1.1–2.2)
ALP SERPL-CCNC: 71 U/L (ref 40–129)
ALT SERPL-CCNC: 8 U/L (ref 10–40)
ANION GAP SERPL CALCULATED.3IONS-SCNC: 10 MMOL/L (ref 3–16)
AST SERPL-CCNC: 19 U/L (ref 15–37)
BILIRUB SERPL-MCNC: 0.3 MG/DL (ref 0–1)
BUN SERPL-MCNC: 18 MG/DL (ref 7–20)
CALCIUM SERPL-MCNC: 9.4 MG/DL (ref 8.3–10.6)
CHLORIDE SERPL-SCNC: 109 MMOL/L (ref 99–110)
CHOLEST SERPL-MCNC: 140 MG/DL (ref 0–199)
CO2 SERPL-SCNC: 23 MMOL/L (ref 21–32)
CREAT SERPL-MCNC: 1 MG/DL (ref 0.6–1.1)
DEPRECATED RDW RBC AUTO: 12.9 % (ref 12.4–15.4)
GFR SERPLBLD CREATININE-BSD FMLA CKD-EPI: >60 ML/MIN/{1.73_M2}
GLUCOSE SERPL-MCNC: 72 MG/DL (ref 70–99)
HCT VFR BLD AUTO: 32.8 % (ref 36–48)
HDLC SERPL-MCNC: 46 MG/DL (ref 40–60)
HGB BLD-MCNC: 11.1 G/DL (ref 12–16)
LDLC SERPL CALC-MCNC: 74 MG/DL
MCH RBC QN AUTO: 28.9 PG (ref 26–34)
MCHC RBC AUTO-ENTMCNC: 33.9 G/DL (ref 31–36)
MCV RBC AUTO: 85.4 FL (ref 80–100)
NT-PROBNP SERPL-MCNC: 194 PG/ML (ref 0–124)
PLATELET # BLD AUTO: 237 K/UL (ref 135–450)
PMV BLD AUTO: 9.6 FL (ref 5–10.5)
POTASSIUM SERPL-SCNC: 3.7 MMOL/L (ref 3.5–5.1)
PROT SERPL-MCNC: 6.8 G/DL (ref 6.4–8.2)
RBC # BLD AUTO: 3.85 M/UL (ref 4–5.2)
SODIUM SERPL-SCNC: 142 MMOL/L (ref 136–145)
TRIGL SERPL-MCNC: 99 MG/DL (ref 0–150)
VLDLC SERPL CALC-MCNC: 20 MG/DL
WBC # BLD AUTO: 3.7 K/UL (ref 4–11)

## 2023-06-03 PROCEDURE — 82306 VITAMIN D 25 HYDROXY: CPT

## 2023-06-03 PROCEDURE — 80061 LIPID PANEL: CPT

## 2023-06-03 PROCEDURE — 80053 COMPREHEN METABOLIC PANEL: CPT

## 2023-06-03 PROCEDURE — 83880 ASSAY OF NATRIURETIC PEPTIDE: CPT

## 2023-06-03 PROCEDURE — 36415 COLL VENOUS BLD VENIPUNCTURE: CPT

## 2023-06-03 PROCEDURE — 85027 COMPLETE CBC AUTOMATED: CPT

## 2023-06-07 ENCOUNTER — OFFICE VISIT (OUTPATIENT)
Dept: CARDIOLOGY CLINIC | Age: 58
End: 2023-06-07

## 2023-06-07 VITALS
OXYGEN SATURATION: 96 % | DIASTOLIC BLOOD PRESSURE: 62 MMHG | BODY MASS INDEX: 21.33 KG/M2 | HEART RATE: 84 BPM | HEIGHT: 65 IN | SYSTOLIC BLOOD PRESSURE: 96 MMHG | WEIGHT: 128 LBS

## 2023-06-07 DIAGNOSIS — I50.32 DIASTOLIC CHF, CHRONIC (HCC): Primary | ICD-10-CM

## 2023-06-07 DIAGNOSIS — R06.02 SOB (SHORTNESS OF BREATH): ICD-10-CM

## 2023-06-07 DIAGNOSIS — I10 ESSENTIAL HYPERTENSION: ICD-10-CM

## 2023-06-07 DIAGNOSIS — E78.00 PURE HYPERCHOLESTEROLEMIA: ICD-10-CM

## 2023-06-07 NOTE — PATIENT INSTRUCTIONS
Plan:  1. Plan for Echo in 1 year. 2.   Labs are very stable. 3.   Labs Fasting in 6 months   4.   See Dr. Xavi Snyder in 6 months

## 2023-06-18 DIAGNOSIS — I50.32 DIASTOLIC CHF, CHRONIC (HCC): ICD-10-CM

## 2023-06-18 DIAGNOSIS — E78.00 PURE HYPERCHOLESTEROLEMIA: ICD-10-CM

## 2023-06-18 DIAGNOSIS — R06.02 SOB (SHORTNESS OF BREATH): ICD-10-CM

## 2023-06-18 DIAGNOSIS — I10 ESSENTIAL HYPERTENSION: ICD-10-CM

## 2023-06-21 ENCOUNTER — TELEPHONE (OUTPATIENT)
Dept: CARDIOLOGY CLINIC | Age: 58
End: 2023-06-21

## 2023-06-21 DIAGNOSIS — E78.00 PURE HYPERCHOLESTEROLEMIA: ICD-10-CM

## 2023-06-21 DIAGNOSIS — I50.32 DIASTOLIC CHF, CHRONIC (HCC): ICD-10-CM

## 2023-06-21 DIAGNOSIS — R06.02 SOB (SHORTNESS OF BREATH): ICD-10-CM

## 2023-06-21 DIAGNOSIS — I10 ESSENTIAL HYPERTENSION: ICD-10-CM

## 2023-06-21 RX ORDER — EZETIMIBE 10 MG/1
10 TABLET ORAL DAILY
Qty: 90 TABLET | Refills: 3 | Status: SHIPPED | OUTPATIENT
Start: 2023-06-21

## 2023-06-21 RX ORDER — PRAVASTATIN SODIUM 10 MG
10 TABLET ORAL DAILY
Qty: 90 TABLET | Refills: 3 | Status: SHIPPED | OUTPATIENT
Start: 2023-06-21

## 2023-06-21 NOTE — TELEPHONE ENCOUNTER
Medication Refill    Medication needing refilled:  ZETIA  pravastatin    Dosage of the medication:  10mg  10mg    How are you taking this medication (QD, BID, TID, QID, PRN):  Take 1 tablet by mouth daily  Take 1 tablet by mouth daily    30 or 90 day supply called in:  90 90    When will you run out of your medication:  06/20    Which Pharmacy are we sending the medication to?:    [id number]   [address] [id number]   Phone:   [phone]   Fax:   [phone]

## 2023-06-21 NOTE — TELEPHONE ENCOUNTER
Pt just seen on 6/7/23    Plan:  1. Plan for Echo in 1 year. 2.   Labs are very stable. 3.   Labs Fasting in 6 months (lipid, bnp, cmp, cbc)  4.   See Dr. Marshall Rush in 6 months       Medications refilled to new pharmacy

## 2023-06-26 ENCOUNTER — TELEPHONE (OUTPATIENT)
Dept: CARDIOLOGY CLINIC | Age: 58
End: 2023-06-26

## 2023-06-26 RX ORDER — MIDODRINE HYDROCHLORIDE 2.5 MG/1
2.5 TABLET ORAL
Qty: 45 TABLET | Refills: 3 | Status: SHIPPED | OUTPATIENT
Start: 2023-06-26

## 2023-06-27 RX ORDER — MIDODRINE HYDROCHLORIDE 2.5 MG/1
TABLET ORAL
Qty: 180 TABLET | OUTPATIENT
Start: 2023-06-27

## 2023-07-25 ENCOUNTER — HOSPITAL ENCOUNTER (OUTPATIENT)
Dept: WOMENS IMAGING | Age: 58
Discharge: HOME | End: 2023-07-25
Payer: COMMERCIAL

## 2023-07-25 VITALS — HEIGHT: 64 IN | BODY MASS INDEX: 21.34 KG/M2 | WEIGHT: 125 LBS

## 2023-07-25 DIAGNOSIS — Z13.9 ENCOUNTER FOR SCREENING: ICD-10-CM

## 2023-07-25 PROCEDURE — 77063 BREAST TOMOSYNTHESIS BI: CPT

## 2023-08-01 ENCOUNTER — OFFICE VISIT (OUTPATIENT)
Dept: NEUROLOGY | Age: 58
End: 2023-08-01
Payer: COMMERCIAL

## 2023-08-01 VITALS
SYSTOLIC BLOOD PRESSURE: 101 MMHG | DIASTOLIC BLOOD PRESSURE: 67 MMHG | WEIGHT: 128 LBS | HEART RATE: 76 BPM | BODY MASS INDEX: 21.97 KG/M2

## 2023-08-01 DIAGNOSIS — G43.709 CHRONIC MIGRAINE W/O AURA W/O STATUS MIGRAINOSUS, NOT INTRACTABLE: Primary | ICD-10-CM

## 2023-08-01 DIAGNOSIS — E11.42 DIABETIC POLYNEUROPATHY ASSOCIATED WITH TYPE 2 DIABETES MELLITUS (HCC): ICD-10-CM

## 2023-08-01 PROCEDURE — 99213 OFFICE O/P EST LOW 20 MIN: CPT | Performed by: PSYCHIATRY & NEUROLOGY

## 2023-08-01 PROCEDURE — 3078F DIAST BP <80 MM HG: CPT | Performed by: PSYCHIATRY & NEUROLOGY

## 2023-08-01 PROCEDURE — 3074F SYST BP LT 130 MM HG: CPT | Performed by: PSYCHIATRY & NEUROLOGY

## 2023-08-01 RX ORDER — TOPIRAMATE 50 MG/1
TABLET, FILM COATED ORAL
Qty: 180 TABLET | Refills: 3 | Status: SHIPPED | OUTPATIENT
Start: 2023-08-01

## 2023-08-01 NOTE — PROGRESS NOTES
to light. Extraocular movements intact. Face symmetrical. Tongue midline. Motor exam shows normal symmetrical strength. Deep tendon reflexes decreased in the lower extremities. Plantar reflexes downgoing. Sensory exam shows decreased light touch in the distal lower extremities Coordination normal. Gait normal. No carotid bruit. No neck stiffness. Data :  LABS:  General Labs:    CBC:   Lab Results   Component Value Date/Time    WBC 3.7 06/03/2023 08:11 AM    RBC 3.85 06/03/2023 08:11 AM    HGB 11.1 06/03/2023 08:11 AM    HCT 32.8 06/03/2023 08:11 AM    MCV 85.4 06/03/2023 08:11 AM    MCH 28.9 06/03/2023 08:11 AM    MCHC 33.9 06/03/2023 08:11 AM    RDW 12.9 06/03/2023 08:11 AM     06/03/2023 08:11 AM    MPV 9.6 06/03/2023 08:11 AM     BMP:    Lab Results   Component Value Date/Time     06/03/2023 08:11 AM    K 3.7 06/03/2023 08:11 AM    K 3.9 03/18/2021 05:25 PM     06/03/2023 08:11 AM    CO2 23 06/03/2023 08:11 AM    BUN 18 06/03/2023 08:11 AM    LABALBU 4.2 06/03/2023 08:11 AM    CREATININE 1.0 06/03/2023 08:11 AM    CALCIUM 9.4 06/03/2023 08:11 AM    GFRAA >60 06/09/2022 09:18 AM    GFRAA >60 04/02/2013 11:58 AM    LABGLOM >60 06/03/2023 08:11 AM    GLUCOSE 72 06/03/2023 08:11 AM    GLUCOSE 192 08/19/2015 07:45 AM     RADIOLOGY REVIEW:  MRI brain    Impression :  Migraine headaches reasonably well controlled  Previous history of schwannoma and status post surgery  Diabetic neuropathy  Patient has titubation of the head but no other evidence of benign essential tremor. MRI brain did not show any posterior fossa lesion or cerebellar lesion. There was scar tissue from the previous surgery. There is persistent mild enlargement of the optic nerve sheath and a partial empty sella and the radiologist was concerned about possible idiopathic intracranial hypertension. Patient has been seen by ophthalmologist and there was no evidence of papilledema.       Plan :  Discussed with

## 2023-11-03 ENCOUNTER — TELEPHONE (OUTPATIENT)
Dept: CARDIOLOGY CLINIC | Age: 58
End: 2023-11-03

## 2023-11-03 NOTE — TELEPHONE ENCOUNTER
PT is faxing over [employer] from her work for [employer] and wants to pick it up on her 12/8 appt. State if there are any questions please call her.

## 2023-11-09 ENCOUNTER — PATIENT MESSAGE (OUTPATIENT)
Dept: CARDIOLOGY CLINIC | Age: 58
End: 2023-11-09

## 2023-11-09 NOTE — TELEPHONE ENCOUNTER
Pt's work has re-faxed the [employer] ppwk at 2:19 today. Please verify it it has been received, please let pt know if it has not been received. They have confirmation it was received.

## 2023-11-25 ENCOUNTER — HOSPITAL ENCOUNTER (OUTPATIENT)
Age: 58
Discharge: HOME | End: 2023-11-25
Payer: COMMERCIAL

## 2023-11-25 DIAGNOSIS — I10 ESSENTIAL HYPERTENSION: ICD-10-CM

## 2023-11-25 DIAGNOSIS — R06.02 SOB (SHORTNESS OF BREATH): ICD-10-CM

## 2023-11-25 DIAGNOSIS — I50.32 DIASTOLIC CHF, CHRONIC (HCC): ICD-10-CM

## 2023-11-25 DIAGNOSIS — E78.00 PURE HYPERCHOLESTEROLEMIA: ICD-10-CM

## 2023-11-25 LAB
25(OH)D3 SERPL-MCNC: 52.3 NG/ML
ALBUMIN SERPL-MCNC: 4.1 G/DL (ref 3.4–5)
ALBUMIN/GLOB SERPL: 1.6 {RATIO} (ref 1.1–2.2)
ALP SERPL-CCNC: 82 U/L (ref 40–129)
ALT SERPL-CCNC: 10 U/L (ref 10–40)
ANION GAP SERPL CALCULATED.3IONS-SCNC: 8 MMOL/L (ref 3–16)
AST SERPL-CCNC: 19 U/L (ref 15–37)
BACTERIA URNS QL MICRO: NORMAL /HPF
BILIRUB SERPL-MCNC: <0.2 MG/DL (ref 0–1)
BILIRUB UR QL STRIP.AUTO: NEGATIVE
BUN SERPL-MCNC: 28 MG/DL (ref 7–20)
CALCIUM SERPL-MCNC: 9.3 MG/DL (ref 8.3–10.6)
CHLORIDE SERPL-SCNC: 110 MMOL/L (ref 99–110)
CHOLEST SERPL-MCNC: 146 MG/DL (ref 0–199)
CLARITY UR: CLEAR
CO2 SERPL-SCNC: 23 MMOL/L (ref 21–32)
COLOR UR: YELLOW
CREAT SERPL-MCNC: 1.1 MG/DL (ref 0.6–1.1)
CREAT UR-MCNC: 130.6 MG/DL (ref 28–259)
DEPRECATED RDW RBC AUTO: 12.4 % (ref 12.4–15.4)
EPI CELLS #/AREA URNS AUTO: 1 /HPF (ref 0–5)
GFR SERPLBLD CREATININE-BSD FMLA CKD-EPI: 59 ML/MIN/{1.73_M2}
GLUCOSE SERPL-MCNC: 83 MG/DL (ref 70–99)
GLUCOSE UR STRIP.AUTO-MCNC: NEGATIVE MG/DL
HCT VFR BLD AUTO: 35.1 % (ref 36–48)
HDLC SERPL-MCNC: 50 MG/DL (ref 40–60)
HGB BLD-MCNC: 11.9 G/DL (ref 12–16)
HGB UR QL STRIP.AUTO: NEGATIVE
HYALINE CASTS #/AREA URNS AUTO: 0 /LPF (ref 0–8)
KETONES UR STRIP.AUTO-MCNC: NEGATIVE MG/DL
LDLC SERPL CALC-MCNC: 84 MG/DL
LEUKOCYTE ESTERASE UR QL STRIP.AUTO: ABNORMAL
MCH RBC QN AUTO: 29.2 PG (ref 26–34)
MCHC RBC AUTO-ENTMCNC: 33.9 G/DL (ref 31–36)
MCV RBC AUTO: 86.2 FL (ref 80–100)
NITRITE UR QL STRIP.AUTO: NEGATIVE
NT-PROBNP SERPL-MCNC: 328 PG/ML (ref 0–124)
PH UR STRIP.AUTO: 6.5 [PH] (ref 5–8)
PLATELET # BLD AUTO: 236 K/UL (ref 135–450)
PMV BLD AUTO: 9.6 FL (ref 5–10.5)
POTASSIUM SERPL-SCNC: 4.4 MMOL/L (ref 3.5–5.1)
PROT SERPL-MCNC: 6.6 G/DL (ref 6.4–8.2)
PROT UR STRIP.AUTO-MCNC: NEGATIVE MG/DL
PROT UR-MCNC: 10 MG/DL
PROT/CREAT UR-RTO: 0.1 MG/DL
PTH-INTACT SERPL-MCNC: 34.8 PG/ML (ref 14–72)
RBC # BLD AUTO: 4.08 M/UL (ref 4–5.2)
RBC CLUMPS #/AREA URNS AUTO: 0 /HPF (ref 0–4)
SODIUM SERPL-SCNC: 141 MMOL/L (ref 136–145)
SP GR UR STRIP.AUTO: 1.02 (ref 1–1.03)
TRIGL SERPL-MCNC: 62 MG/DL (ref 0–150)
UA DIPSTICK W REFLEX MICRO PNL UR: YES
URN SPEC COLLECT METH UR: ABNORMAL
UROBILINOGEN UR STRIP-ACNC: 1 E.U./DL
VLDLC SERPL CALC-MCNC: 12 MG/DL
WBC # BLD AUTO: 3.9 K/UL (ref 4–11)
WBC #/AREA URNS AUTO: 2 /HPF (ref 0–5)

## 2023-11-25 PROCEDURE — 84156 ASSAY OF PROTEIN URINE: CPT

## 2023-11-25 PROCEDURE — 36415 COLL VENOUS BLD VENIPUNCTURE: CPT

## 2023-11-25 PROCEDURE — 80053 COMPREHEN METABOLIC PANEL: CPT

## 2023-11-25 PROCEDURE — 82570 ASSAY OF URINE CREATININE: CPT

## 2023-11-25 PROCEDURE — 80061 LIPID PANEL: CPT

## 2023-11-25 PROCEDURE — 83880 ASSAY OF NATRIURETIC PEPTIDE: CPT

## 2023-11-25 PROCEDURE — 82306 VITAMIN D 25 HYDROXY: CPT

## 2023-11-25 PROCEDURE — 85027 COMPLETE CBC AUTOMATED: CPT

## 2023-11-25 PROCEDURE — 83970 ASSAY OF PARATHORMONE: CPT

## 2023-11-25 PROCEDURE — 81001 URINALYSIS AUTO W/SCOPE: CPT

## 2023-12-07 NOTE — PROGRESS NOTES
[facility]   Advanced Heart Failure/Pulmonary Hypertension  Cardiac Follow up       Keyonna Edmond  YOB: 1965    Date of Visit:  12/8/23    Chief Complaint   Patient presents with    Congestive Heart Failure       History of Present Illness:  Keyonna Edmond is a 63 y.o. female with past medical history significant for dCHF, PHTN, NIRALI, HTN, and CKD. Valsartan was discontinued in May 2018 secondary symptomatic hypotension with it's use (97/52). She sees Dr. Ortega Booth neurology for headaches. 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 Mita Ramos at [facility]. 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 She has been seeing Dr. Ortega Booth for headches and is taking Topiramate 50mg  one in the AM and two tabs at HS; (history of congenital migraines -- may need brain surgery to insert fat). He wanted to prescribe Verapamil for headaches, but her BP runs low, and she takes Midodrine. She was seen in [facility] ER 3/18/2021 with chest discomfort, was s/p Covid vaccine x 24hrs. Work up negative for acute disorders, and her pain self-resolved while there. She was discharged home in stable condition. She did not have any problems after the second vaccine. She has continued with chest pain in May and saw Corey Kramer for an urgent visit. Today, she is here for follow up for diastolic chronic heart failure. She states she is feeling good. Reviewed current medication list. She takes midodrine three times a week. Denies chest pain, shortness of breath, syncope, orthopnea, palpitations, or dizziness.             NYHA Class II    Allergies   Allergen Reactions    Lisinopril Other (See Comments)     cough    Statins Other (See Comments)     Light-headedness, cp, fast HR    Dilaudid [Hydromorphone Hcl] Nausea And Vomiting    Skelaxin [Metaxalone] Palpitations     Heart beats fast

## 2023-12-08 ENCOUNTER — OFFICE VISIT (OUTPATIENT)
Dept: CARDIOLOGY CLINIC | Age: 58
End: 2023-12-08

## 2023-12-08 VITALS
WEIGHT: 128.8 LBS | HEART RATE: 71 BPM | SYSTOLIC BLOOD PRESSURE: 88 MMHG | BODY MASS INDEX: 21.99 KG/M2 | OXYGEN SATURATION: 99 % | HEIGHT: 64 IN | DIASTOLIC BLOOD PRESSURE: 44 MMHG

## 2023-12-08 DIAGNOSIS — E78.00 PURE HYPERCHOLESTEROLEMIA: ICD-10-CM

## 2023-12-08 DIAGNOSIS — I50.32 DIASTOLIC CHF, CHRONIC (HCC): Primary | ICD-10-CM

## 2023-12-08 DIAGNOSIS — R06.02 SOB (SHORTNESS OF BREATH): ICD-10-CM

## 2023-12-08 DIAGNOSIS — I10 ESSENTIAL HYPERTENSION: ICD-10-CM

## 2023-12-08 RX ORDER — CARVEDILOL 3.12 MG/1
3.12 TABLET ORAL 2 TIMES DAILY
Qty: 180 TABLET | Refills: 3 | Status: SHIPPED | OUTPATIENT
Start: 2023-12-08

## 2023-12-08 RX ORDER — MIDODRINE HYDROCHLORIDE 2.5 MG/1
2.5 TABLET ORAL
Qty: 45 TABLET | Refills: 3 | Status: SHIPPED | OUTPATIENT
Start: 2023-12-08

## 2023-12-08 RX ORDER — POTASSIUM CHLORIDE 750 MG/1
10 TABLET, EXTENDED RELEASE ORAL DAILY
Qty: 90 TABLET | Refills: 1 | Status: SHIPPED | OUTPATIENT
Start: 2023-12-08

## 2023-12-08 RX ORDER — FUROSEMIDE 40 MG/1
TABLET ORAL
Qty: 90 TABLET | Refills: 3 | Status: SHIPPED | OUTPATIENT
Start: 2023-12-08

## 2023-12-08 RX ORDER — SPIRONOLACTONE 25 MG/1
25 TABLET ORAL DAILY
Qty: 90 TABLET | Refills: 3 | Status: SHIPPED | OUTPATIENT
Start: 2023-12-08

## 2023-12-08 RX ORDER — EZETIMIBE 10 MG/1
10 TABLET ORAL DAILY
Qty: 90 TABLET | Refills: 3 | Status: SHIPPED | OUTPATIENT
Start: 2023-12-08

## 2023-12-08 RX ORDER — PRAVASTATIN SODIUM 10 MG
10 TABLET ORAL DAILY
Qty: 90 TABLET | Refills: 3 | Status: SHIPPED | OUTPATIENT
Start: 2023-12-08

## 2023-12-08 NOTE — PATIENT INSTRUCTIONS
Plan:  1. Get labs in 6 months ( CBC, CMP,BNP,Lipid)  2.  See Dr. Linda Salgado in  6 months with an Echo

## 2024-02-14 ENCOUNTER — TELEPHONE (OUTPATIENT)
Dept: CARDIOLOGY CLINIC | Age: 59
End: 2024-02-14

## 2024-02-14 NOTE — TELEPHONE ENCOUNTER
CARDIAC CLEARANCE     What type of procedure are you having?  Breast revision of left breast / general anesthesia  Which physician is performing your procedure?  Dr Ferrer  When is your procedure scheduled for?  2/27  Where are you having this [facility]  Are you taking Blood Thinners? no   If so what? (Name/dose/frequesncy)     Does the surgeon want you to stop your blood thinner?  If so for how long?    Phone Number and Contact Name for Physicians office:  [phone]  Fax number to send information:  Attn:  Kacie  [phone]

## 2024-02-14 NOTE — TELEPHONE ENCOUNTER
Seen by MADELINE 12/8/23    Plan:  1. Get labs in 6 months ( CBC, CMP,BNP,Lipid)  2. See Dr. Gibson in  6 months with an Echo    Last Echo 6/15/22  EF 55-60%, normal diastolic, RVSP 19, Normal valves.     Last stress 10/5/21   Normal myocardial perfusion study.   Normal myocardial perfusion

## 2024-03-08 RX ORDER — POTASSIUM CHLORIDE 750 MG/1
10 TABLET, EXTENDED RELEASE ORAL DAILY
Qty: 90 TABLET | Refills: 3 | Status: SHIPPED | OUTPATIENT
Start: 2024-03-08

## 2024-03-08 NOTE — TELEPHONE ENCOUNTER
Last ov:23 MADELINE  Next ov:24 MADELINE  Last EK24  Last labs:23  Last filled:   Disp Refills Start End    potassium chloride (KLOR-CON M) 10 MEQ extended release tablet 90 tablet 1 2023 --    Sig - Route: Take 1 tablet by mouth daily - Oral    Sent to pharmacy as: Potassium Chloride Mechelle ER 10 MEQ Oral Tablet Extended Release (KLOR-CON M)    Cosign for Ordering: Accepted by Nivia Gibson MD on 2023 10:25 AM    E-Prescribing Status: Receipt confirmed by pharmacy (2023 10:07 AM EST)

## 2024-04-29 DIAGNOSIS — G43.709 CHRONIC MIGRAINE W/O AURA W/O STATUS MIGRAINOSUS, NOT INTRACTABLE: ICD-10-CM

## 2024-04-29 RX ORDER — TOPIRAMATE 50 MG/1
TABLET, FILM COATED ORAL
Qty: 180 TABLET | Refills: 1 | Status: SHIPPED | OUTPATIENT
Start: 2024-04-29

## 2024-04-29 NOTE — TELEPHONE ENCOUNTER
Appt 9/3/24    LF 8/1/23 for one year    Pharmacy change:     [facility] closed and didn't transfer refills to [facility].

## 2024-05-12 NOTE — TELEPHONE ENCOUNTER
CARDIAC CLEARANCE     What type of procedure are you having? Skin Removal on  both arms    Which physician is performing your procedure? Dr. Zoe Sellers    When is your procedure scheduled for?  03/14    Where are you having this procedure? [facility]    Are you taking Blood Thinners? No   If so what? (Name/dose/frequesncy)     Does the surgeon want you to stop your blood thinner? If so for how long?  mN/A    Phone Number and Contact Name for Physicians office:  [phone]  -  Fax number to send information:  [phone]      NOTE:  Per Pt these two numbers are on the business card that she was given [Follow-Up] : a follow-up visit [Cough] : cough

## 2024-05-25 ENCOUNTER — HOSPITAL ENCOUNTER (OUTPATIENT)
Age: 59
Discharge: HOME | End: 2024-05-25
Payer: COMMERCIAL

## 2024-05-25 DIAGNOSIS — I50.32 DIASTOLIC CHF, CHRONIC (HCC): ICD-10-CM

## 2024-05-25 DIAGNOSIS — E78.00 PURE HYPERCHOLESTEROLEMIA: ICD-10-CM

## 2024-05-25 DIAGNOSIS — R06.02 SOB (SHORTNESS OF BREATH): ICD-10-CM

## 2024-05-25 DIAGNOSIS — I10 ESSENTIAL HYPERTENSION: ICD-10-CM

## 2024-05-25 LAB
ALBUMIN SERPL-MCNC: 4.6 G/DL (ref 3.4–5)
ALBUMIN/GLOB SERPL: 1.6 {RATIO} (ref 1.1–2.2)
ALP SERPL-CCNC: 86 U/L (ref 40–129)
ALT SERPL-CCNC: 8 U/L (ref 10–40)
ANION GAP SERPL CALCULATED.3IONS-SCNC: 13 MMOL/L (ref 3–16)
AST SERPL-CCNC: 16 U/L (ref 15–37)
BILIRUB SERPL-MCNC: 0.3 MG/DL (ref 0–1)
BUN SERPL-MCNC: 32 MG/DL (ref 7–20)
CALCIUM SERPL-MCNC: 9.7 MG/DL (ref 8.3–10.6)
CHLORIDE SERPL-SCNC: 107 MMOL/L (ref 99–110)
CHOLEST SERPL-MCNC: 145 MG/DL (ref 0–199)
CO2 SERPL-SCNC: 22 MMOL/L (ref 21–32)
CREAT SERPL-MCNC: 1.1 MG/DL (ref 0.6–1.1)
DEPRECATED RDW RBC AUTO: 12.5 % (ref 12.4–15.4)
GFR SERPLBLD CREATININE-BSD FMLA CKD-EPI: 58 ML/MIN/{1.73_M2}
GLUCOSE SERPL-MCNC: 87 MG/DL (ref 70–99)
HCT VFR BLD AUTO: 36.1 % (ref 36–48)
HDLC SERPL-MCNC: 53 MG/DL (ref 40–60)
HGB BLD-MCNC: 12.2 G/DL (ref 12–16)
LDLC SERPL CALC-MCNC: 77 MG/DL
MCH RBC QN AUTO: 29 PG (ref 26–34)
MCHC RBC AUTO-ENTMCNC: 33.8 G/DL (ref 31–36)
MCV RBC AUTO: 85.7 FL (ref 80–100)
NT-PROBNP SERPL-MCNC: 72 PG/ML (ref 0–124)
PLATELET # BLD AUTO: 267 K/UL (ref 135–450)
PMV BLD AUTO: 9.5 FL (ref 5–10.5)
POTASSIUM SERPL-SCNC: 4.2 MMOL/L (ref 3.5–5.1)
PROT SERPL-MCNC: 7.4 G/DL (ref 6.4–8.2)
RBC # BLD AUTO: 4.21 M/UL (ref 4–5.2)
SODIUM SERPL-SCNC: 142 MMOL/L (ref 136–145)
TRIGL SERPL-MCNC: 73 MG/DL (ref 0–150)
VLDLC SERPL CALC-MCNC: 15 MG/DL
WBC # BLD AUTO: 4.1 K/UL (ref 4–11)

## 2024-05-25 PROCEDURE — 80053 COMPREHEN METABOLIC PANEL: CPT

## 2024-05-25 PROCEDURE — 36415 COLL VENOUS BLD VENIPUNCTURE: CPT

## 2024-05-25 PROCEDURE — 80061 LIPID PANEL: CPT

## 2024-05-25 PROCEDURE — 83880 ASSAY OF NATRIURETIC PEPTIDE: CPT

## 2024-05-25 PROCEDURE — 85027 COMPLETE CBC AUTOMATED: CPT

## 2024-06-11 NOTE — PROGRESS NOTES
[facility]   Advanced Heart Failure/Pulmonary Hypertension  Cardiac Follow up       Andria Jay  YOB: 1965    Date of Visit:  6/12/24    Chief Complaint   Patient presents with    Congestive Heart Failure       History of Present Illness:  Andria Jay is a 58 y.o. female with past medical history significant for dCHF, PHTN, NIRALI, HTN, and CKD. Valsartan was discontinued in May 2018 secondary symptomatic hypotension with it's use (97/52).  She sees Dr. Herrera neurology for headaches.  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 Douglas at [facility].  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 She has been seeing Dr. Herrera for headches and is taking Topiramate 50mg  one in the AM and two tabs at HS; (history of congenital migraines -- may need brain surgery to insert fat). He wanted to prescribe Verapamil for headaches, but her BP runs low, and she takes Midodrine.    She was seen in [facility] ER 3/18/2021 with chest discomfort, was s/p Covid vaccine x 24hrs. Work up negative for acute disorders, and her pain self-resolved while there. She was discharged home in stable condition. She did not have any problems after the second vaccine.     She has continued with chest pain in May and saw Ramona for an urgent visit.     Echo 6/15/22 showed estimated ejection fraction of 55-60%.     Today, she is here for follow up for diastolic chronic heart failure. She has just returned from [place], [place].  Denies chest pain, shortness of breath, syncope, orthopnea, palpitations, or dizziness.  She had swelling with her 15 hour drive from [place] and took 1/2 of a Lasix x 1 which took the swelling away.  She is taking Mounjaro 5mg every other week.  States her last A1c was 5.0.  Echo today with EF 55-60% and normal diastolic function.  She is taking Midodrine Monday, 
pressure secondary to incomplete TR jet envelope.  The right ventricle is normal in size and function.    Echo 3/7/19  Normal left ventricle size, wall thickness, and systolic function with an estimated ejection fraction of 55-60% visually, 58% by 3D HM and 55% by 3D FV.  No regional wall motion abnormalities are seen.  Trivial pulmonic regurgitation present.  Normal diastolic function. Avg. E/e'=7.75      Echo 8/25/2017  Normal left ventricle size, wall thickness and systolic function with an  estimated ejection fraction of 55-60%.  Trivial tricuspid regurgitation.    Echo 8/26/2016  Normal left ventricle size, wall thickness and systolic function with an EF of 55%.  Reversal of E/A inflow velocities across the mitral valve suggesting impaired left ventricular relaxation.     Cardiac Cath 5/8/14  LM-normal  LAD-normal  Cx- normal  RCA-normal, dominant   LVEF-55%     Hemodynamics: RA mean 4  RV 32/0  PA 32/11 mean 19  PAW 10/9 mean 5  Thermal: C.O. 4.06 and C.I. 2.05  Glenn: C.O. 4.96 and C.I. 2.51      Labs were reviewed including labs from other hospital systems through [facility].  Cardiac testing was reviewed including echos, nuclear scans, cardiac catheterization, including from other hospital systems through [facility].    Assessment:  No diagnosis found.          1. Diastolic CHF, chronic: Stable. Compensated by exam.     -ProBNP 29 (6/4/21)> 63, 55> 85, BNP 72 (5/25/24)  -Continue lasix 2-3 x wk, Midodrine MWF  -Echo 6/15/22 > EF 55-60%, Normal diastolic function.   -ECHO 11/30/2020>EF 55-60%, indeterminate diastolic function.  -ECHO 3/7/2019> EF 55-60%, normal diastolic function.    Heart Failure Therapies:  The 4 Pillars of Heart Failure Therapies    ACE inhibitors, angiotensin receptor blockers, or ARNI (Entresto):     2.   Beta blockers: Coreg 3.125 mg BID     3.   Aldosterone blockers: Aldactone 25 mg daily     4.   SGLT2 inhibitors:         2. Essential hypertension: Stable  There were no

## 2024-06-12 ENCOUNTER — HOSPITAL ENCOUNTER (OUTPATIENT)
Age: 59
Discharge: HOME | End: 2024-06-14
Attending: INTERNAL MEDICINE
Payer: COMMERCIAL

## 2024-06-12 ENCOUNTER — OFFICE VISIT (OUTPATIENT)
Dept: CARDIOLOGY CLINIC | Age: 59
End: 2024-06-12

## 2024-06-12 VITALS
HEART RATE: 68 BPM | BODY MASS INDEX: 20.49 KG/M2 | OXYGEN SATURATION: 98 % | DIASTOLIC BLOOD PRESSURE: 82 MMHG | SYSTOLIC BLOOD PRESSURE: 108 MMHG | HEIGHT: 64 IN | WEIGHT: 120 LBS

## 2024-06-12 VITALS
DIASTOLIC BLOOD PRESSURE: 44 MMHG | SYSTOLIC BLOOD PRESSURE: 88 MMHG | BODY MASS INDEX: 21.85 KG/M2 | HEIGHT: 64 IN | WEIGHT: 128 LBS

## 2024-06-12 DIAGNOSIS — I10 ESSENTIAL HYPERTENSION: ICD-10-CM

## 2024-06-12 DIAGNOSIS — R06.02 SOB (SHORTNESS OF BREATH): ICD-10-CM

## 2024-06-12 DIAGNOSIS — I50.32 DIASTOLIC CHF, CHRONIC (HCC): Primary | ICD-10-CM

## 2024-06-12 DIAGNOSIS — E78.00 PURE HYPERCHOLESTEROLEMIA: ICD-10-CM

## 2024-06-12 DIAGNOSIS — E78.2 MIXED HYPERLIPIDEMIA: ICD-10-CM

## 2024-06-12 DIAGNOSIS — I50.32 DIASTOLIC CHF, CHRONIC (HCC): ICD-10-CM

## 2024-06-12 LAB
ECHO AO ASC DIAM: 3.3 CM
ECHO AO ASCENDING AORTA INDEX: 2.04 CM/M2
ECHO AO ROOT DIAM: 3.1 CM
ECHO AO ROOT INDEX: 1.91 CM/M2
ECHO AV AREA PEAK VELOCITY: 2.3 CM2
ECHO AV AREA VTI: 2.2 CM2
ECHO AV AREA/BSA PEAK VELOCITY: 1.4 CM2/M2
ECHO AV AREA/BSA VTI: 1.4 CM2/M2
ECHO AV MEAN GRADIENT: 3 MMHG
ECHO AV MEAN VELOCITY: 0.8 M/S
ECHO AV PEAK GRADIENT: 5 MMHG
ECHO AV PEAK VELOCITY: 1.1 M/S
ECHO AV VELOCITY RATIO: 0.73
ECHO AV VTI: 24.4 CM
ECHO BSA: 1.62 M2
ECHO EST RA PRESSURE: 3 MMHG
ECHO IVC PROX: 1.9 CM
ECHO LA AREA 2C: 16.1 CM2
ECHO LA AREA 4C: 9.8 CM2
ECHO LA DIAMETER INDEX: 1.67 CM/M2
ECHO LA DIAMETER: 2.7 CM
ECHO LA MAJOR AXIS: 3.9 CM
ECHO LA MINOR AXIS: 4.7 CM
ECHO LA TO AORTIC ROOT RATIO: 0.87
ECHO LA VOL BP: 31 ML (ref 22–52)
ECHO LA VOL MOD A2C: 44 ML (ref 22–52)
ECHO LA VOL MOD A4C: 17 ML (ref 22–52)
ECHO LA VOL/BSA BIPLANE: 19 ML/M2 (ref 16–34)
ECHO LA VOLUME INDEX MOD A2C: 27 ML/M2 (ref 16–34)
ECHO LA VOLUME INDEX MOD A4C: 10 ML/M2 (ref 16–34)
ECHO LV E' LATERAL VELOCITY: 11 CM/S
ECHO LV E' SEPTAL VELOCITY: 9 CM/S
ECHO LV EDV A2C: 69 ML
ECHO LV EDV A4C: 58 ML
ECHO LV EDV INDEX A4C: 36 ML/M2
ECHO LV EDV NDEX A2C: 43 ML/M2
ECHO LV EJECTION FRACTION A2C: 62 %
ECHO LV EJECTION FRACTION A4C: 60 %
ECHO LV EJECTION FRACTION BIPLANE: 61 % (ref 55–100)
ECHO LV ESV A2C: 26 ML
ECHO LV ESV A4C: 23 ML
ECHO LV ESV INDEX A2C: 16 ML/M2
ECHO LV ESV INDEX A4C: 14 ML/M2
ECHO LV FRACTIONAL SHORTENING: 33 % (ref 28–44)
ECHO LV INTERNAL DIMENSION DIASTOLE INDEX: 2.47 CM/M2
ECHO LV INTERNAL DIMENSION DIASTOLIC: 4 CM (ref 3.9–5.3)
ECHO LV INTERNAL DIMENSION SYSTOLIC INDEX: 1.67 CM/M2
ECHO LV INTERNAL DIMENSION SYSTOLIC: 2.7 CM
ECHO LV IVSD: 0.5 CM (ref 0.6–0.9)
ECHO LV MASS 2D: 57.7 G (ref 67–162)
ECHO LV MASS INDEX 2D: 35.6 G/M2 (ref 43–95)
ECHO LV POSTERIOR WALL DIASTOLIC: 0.6 CM (ref 0.6–0.9)
ECHO LV RELATIVE WALL THICKNESS RATIO: 0.3
ECHO LVOT AREA: 3.1 CM2
ECHO LVOT AV VTI INDEX: 0.7
ECHO LVOT DIAM: 2 CM
ECHO LVOT MEAN GRADIENT: 1 MMHG
ECHO LVOT PEAK GRADIENT: 3 MMHG
ECHO LVOT PEAK VELOCITY: 0.8 M/S
ECHO LVOT STROKE VOLUME INDEX: 33.3 ML/M2
ECHO LVOT SV: 54 ML
ECHO LVOT VTI: 17.2 CM
ECHO MV A VELOCITY: 0.79 M/S
ECHO MV AREA VTI: 2 CM2
ECHO MV E DECELERATION TIME (DT): 187 MS
ECHO MV E VELOCITY: 0.79 M/S
ECHO MV E/A RATIO: 1
ECHO MV E/E' LATERAL: 7.18
ECHO MV E/E' RATIO (AVERAGED): 7.98
ECHO MV E/E' SEPTAL: 8.78
ECHO MV LVOT VTI INDEX: 1.58
ECHO MV MAX VELOCITY: 0.8 M/S
ECHO MV MEAN GRADIENT: 1 MMHG
ECHO MV MEAN VELOCITY: 0.5 M/S
ECHO MV PEAK GRADIENT: 3 MMHG
ECHO MV VTI: 27.1 CM
ECHO PULMONARY ARTERY END DIASTOLIC PRESSURE: 3 MMHG
ECHO PV MAX VELOCITY: 0.8 M/S
ECHO PV PEAK GRADIENT: 3 MMHG
ECHO PV REGURGITANT MAX VELOCITY: 0.9 M/S
ECHO RA AREA 4C: 7.8 CM2
ECHO RA END SYSTOLIC VOLUME APICAL 4 CHAMBER INDEX BSA: 9 ML/M2
ECHO RA VOLUME: 14 ML
ECHO RIGHT VENTRICULAR SYSTOLIC PRESSURE (RVSP): 17 MMHG
ECHO RV BASAL DIMENSION: 2.1 CM
ECHO RV FREE WALL PEAK S': 11 CM/S
ECHO RV LONGITUDINAL DIMENSION: 4.9 CM
ECHO RV MID DIMENSION: 1.6 CM
ECHO RV TAPSE: 1.8 CM (ref 1.7–?)
ECHO TV REGURGITANT MAX VELOCITY: 1.85 M/S
ECHO TV REGURGITANT PEAK GRADIENT: 14 MMHG

## 2024-06-12 PROCEDURE — 93306 TTE W/DOPPLER COMPLETE: CPT

## 2024-06-12 NOTE — PATIENT INSTRUCTIONS
Plan:  1.   Continue same medications.   2.   Echo is Normal  3.   Labs FASTING in 6 months   4.   See Dr. Gibson in 6 months

## 2024-07-10 NOTE — PROGRESS NOTES
ANTICOAGULATION MANAGEMENT     Daniela Ladd 52 year old female is on warfarin with supratherapeutic INR result. (Goal INR 2.5-3.5)    Recent labs: (last 7 days)     07/10/24  0735   INR 3.6       ASSESSMENT     Source(s): Chart Review and Patient/Caregiver Call     Warfarin doses taken: Warfarin taken as instructed  Diet: No new diet changes identified  Medication/supplement changes: None noted  New illness, injury, or hospitalization: No  Signs or symptoms of bleeding or clotting: No  Previous result: Supratherapeutic  Additional findings: None       PLAN     Recommended plan for no diet, medication or health factor changes affecting INR     Dosing Instructions: Continue your current warfarin dose with next INR in 1 week       Summary  As of 7/10/2024      Full warfarin instructions:  10 mg every Mon, Wed, Fri; 8 mg all other days   Next INR check:  7/17/2024               Telephone call with Daniela who verbalizes understanding and agrees to plan    Patient to recheck with home meter    Education provided: None required    Plan made per [facility] anticoagulation protocol    Yoly Crowe, RN  Anticoagulation Clinic  7/10/2024    _______________________________________________________________________     Anticoagulation Episode Summary       Current INR goal:  2.5-3.5   TTR:  69.1% (1 y)   Target end date:  Indefinite   Send INR reminders to:  [facility]    Indications    Blood clot of vein in shoulder area  left [I82.602]  Factor 5 Leiden mutation  heterozygous (H24) [D68.51]  Long-term (current) use of anticoagulants [Z79.01] [Z79.01]             Comments:  Has home INR machine weekly INR needed                Anticoagulation Care Providers       Provider Role Specialty Phone number    Valeri, Ling Rodriguez PA-C Referring Family Medicine [phone]               Lalo Ayoub   Neurology followup    Subjective:   CC/HP  History was obtained from the patient. Interval history:  Patient states that the headaches are reasonably well controlled. She gets occasional stress headache. No major side effects of topiramate. She has been following a strict diet and avoiding chocolate and nitrate-containing foods. New symptom-patient states that she had an MRI done at [facility] as follow-up for schwannoma removal.  It showed some scar tissue from the previous surgery. There was also prominence of the optic nerves and there was concern for possible pseudotumor cerebri. Patient's nephrologist wanted me to make further recommendations regarding the MRI findings. Detailed history:  Patient has chronic migraine headaches. No significant side effects of medication  Headaches are usually described as mainly in the left parietal occipital region and are throbbing. Bright light and loud noise used to make the headaches worse.       REVIEW OF SYSTEMS    Constitutional:  []   Chills   [x]  Fatigue   []  Fevers   []  Malaise   []  Weight loss     [] Denies all of the above    Respiratory:   []  Cough    [x]  Shortness of breath         [] Denies all of the above     Cardiovascular:   []  Chest pain    []  Exertional chest pressure/discomfort           [] Palpitations    [x]  Syncope     [] Denies all of the above        Past Medical History:   Diagnosis Date    Abdominal pain 9/9/2010    Arthritis     Asthma     Chest pain 6/22/2011    CHF [facility]congestive heart failure) [facility])     Diastolic heart failure     5/17/10-heart worsening-Dr. Magdalene Elaine    Dyspnea     Heart failure, diastolic ([facility])     Hemoptysis 5/14/2012    Hyperlipidemia     Hypertension     Nausea & vomiting     PONV (postoperative nausea and vomiting)     Pulmonary hypertension ([address]     Pulmonary hypertension due to left ventricular diastolic dysfunction (HCC)     Renal failure     Sleep apnea SUPPOSED TO USE CPAP    Vertigo      Family History   Problem Relation Age of Onset   Verl Raw Cancer Father         lung    Hypertension Father     High Blood Pressure Father     Hypertension Brother     High Blood Pressure Brother     Hypertension Mother    Verl Raw Other Mother         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/2009    Heart Disease Mother     High Blood Pressure Mother     Cancer Paternal Grandmother     Hypertension Other         aunt    Stroke Maternal Grandfather     Cancer Maternal Aunt         breast    High Blood Pressure Maternal Aunt     Cancer Paternal Aunt         lung    Cancer Paternal Uncle         brain    Heart Disease Maternal Grandmother     High Blood Pressure Maternal Grandmother     Heart Disease Paternal Grandfather     Diabetes Neg Hx     Osteoporosis Neg Hx     Thyroid Disease Neg Hx     Anesth Problems Neg Hx     Malig Hyperten Neg Hx     Hypotension Neg Hx     Malig Hypertherm Neg Hx     Pseudochol. Deficiency Neg Hx      Social History     Socioeconomic History    Marital status:      Spouse name: None    Number of children: None    Years of education: None    Highest education level: None   Occupational History    None   Tobacco Use    Smoking status: Never Smoker    Smokeless tobacco: Never Used   Vaping Use    Vaping Use: Never used   Substance and Sexual Activity    Alcohol use: Not Currently     Alcohol/week: 0.0 standard drinks     Comment: rarely    Drug use: No    Sexual activity: Yes     Partners: Male   Other Topics Concern    None   Social History Narrative    None     Social Determinants of Health     Financial Resource Strain:     Difficulty of Paying Living Expenses: Not on file   Food Insecurity:     Worried About Running Out of Food in the Last Year: Not on file    [place] of Food in the Last Year: Not on file   Transportation Needs:     Lack of Transportation (Medical):  Not on file    Lack of Transportation (Non-Medical): Not on file   Physical Activity:     Days of Exercise per Week: Not on file    Minutes of Exercise per Session: Not on file   Stress:     Feeling of Stress : Not on file   Social Connections:     Frequency of Communication with Friends and Family: Not on file    Frequency of Social Gatherings with Friends and Family: Not on file    Attends Roman Catholic Services: Not on file    Active Member of [address] [place] [address] or Organizations: Not on file    Attends Club or Organization Meetings: Not on file    Marital Status: Not on file   Intimate Partner Violence:     Fear of Current or Ex-Partner: Not on file    Emotionally Abused: Not on file    Physically Abused: Not on file    Sexually Abused: Not on file   Housing Stability:     Unable to Pay for Housing in the Last Year: Not on file    Number of [place] in the Last Year: Not on file    Unstable Housing in the Last Year: Not on file        Objective:  Exam:  BP 96/71   Pulse 74   Ht 5' 5\" (1.651 m)   Wt 159 lb (72.1 kg)   BMI 26.46 kg/m²   This is a well-nourished patient in no acute distress  Patient is awake, alert and oriented x3. Speech is normal.  Pupils are equal round reacting to light. Extraocular movements intact. Face symmetrical. Tongue midline. Motor exam shows normal symmetrical strength. Deep tendon reflexes decreased in the lower extremities. Plantar reflexes downgoing. Sensory exam shows decreased light touch in the distal lower extremities Coordination normal. Gait normal. No carotid bruit. No neck stiffness.         Data :  LABS:  General Labs:    CBC:   Lab Results   Component Value Date    WBC 4.1 11/22/2021    RBC 4.17 11/22/2021    HGB 11.9 11/22/2021    HCT 35.9 11/22/2021    MCV 86.2 11/22/2021    MCH 28.6 11/22/2021    MCHC 33.2 11/22/2021    RDW 12.7 11/22/2021     11/22/2021    MPV 9.8 11/22/2021     BMP:    Lab Results   Component Value Date     11/29/2021    K 3.6 11/29/2021    K 3.9 03/18/2021     11/29/2021 CO2 25 11/29/2021    BUN 22 11/29/2021    LABALBU 4.3 11/22/2021    CREATININE 1.2 11/29/2021    CALCIUM 9.7 11/29/2021    GFRAA 56 11/29/2021    GFRAA >60 04/02/2013    LABGLOM 47 11/29/2021    GLUCOSE 102 11/29/2021    GLUCOSE 192 08/19/2015     RADIOLOGY REVIEW:  MRI brain    Impression :  Migraine headaches reasonably well controlled  Previous history of schwannoma and status post surgery  Diabetic neuropathy  Patient has titubation of the head but no other evidence of benign essential tremor. MRI brain did not show any posterior fossa lesion or cerebellar lesion. There was scar tissue from the previous surgery. There is persistent mild enlargement of the optic nerve sheath and a partial empty sella and the radiologist was concerned about possible idiopathic intracranial hypertension. Plan :  Discussed with patient  Continue to avoid chocolate and nitrite-containing foods  Continue topiramate 50 mg, twice daily  Side effects were discussed. Strict control of diabetes was discussed  Since her tremors are mild we decided not to do any medication for the tremors  Continue with good control of diabetes. We discussed about further work-up for the prominent optic nerve sheath and a partial empty sella syndrome. I explained to the patient that the first would be to get an ophthalmology evaluation and look for any subtle evidence of papilledema. If she has papilledema then she may need to have a lumbar puncture done otherwise no need for LP at this time. Please note a portion of  this chart was generated using dragon dictation software. Although every effort was made to ensure the accuracy of this automated transcription, some errors in transcription may have occurred.

## 2024-08-06 ENCOUNTER — HOSPITAL ENCOUNTER (OUTPATIENT)
Dept: WOMENS IMAGING | Age: 59
Discharge: HOME | End: 2024-08-06
Payer: COMMERCIAL

## 2024-08-06 VITALS — HEIGHT: 64 IN | BODY MASS INDEX: 20.14 KG/M2 | WEIGHT: 118 LBS

## 2024-08-06 DIAGNOSIS — Z12.31 VISIT FOR SCREENING MAMMOGRAM: ICD-10-CM

## 2024-08-06 PROCEDURE — 77063 BREAST TOMOSYNTHESIS BI: CPT

## 2024-09-03 ENCOUNTER — OFFICE VISIT (OUTPATIENT)
Dept: NEUROLOGY | Age: 59
End: 2024-09-03
Payer: COMMERCIAL

## 2024-09-03 VITALS
HEART RATE: 75 BPM | BODY MASS INDEX: 20.14 KG/M2 | DIASTOLIC BLOOD PRESSURE: 73 MMHG | WEIGHT: 118 LBS | SYSTOLIC BLOOD PRESSURE: 102 MMHG | HEIGHT: 64 IN

## 2024-09-03 DIAGNOSIS — G43.709 CHRONIC MIGRAINE W/O AURA W/O STATUS MIGRAINOSUS, NOT INTRACTABLE: Primary | ICD-10-CM

## 2024-09-03 PROCEDURE — 3078F DIAST BP <80 MM HG: CPT | Performed by: PSYCHIATRY & NEUROLOGY

## 2024-09-03 PROCEDURE — 3074F SYST BP LT 130 MM HG: CPT | Performed by: PSYCHIATRY & NEUROLOGY

## 2024-09-03 PROCEDURE — 99213 OFFICE O/P EST LOW 20 MIN: CPT | Performed by: PSYCHIATRY & NEUROLOGY

## 2024-09-03 NOTE — PATIENT INSTRUCTIONS
YOU MUST CONFIRM YOUR APPOINTMENT 1 DAY PRIOR OR IT WILL BE CANCELLED!!   Our office will call you 3 times the day prior to your appointment in an attempt to confirm.  Please return our call ASAP or confirm your appt through [employer] no later than 3 pm the day before your appointment.  If we do not hear back from you by 3 pm to confirm, your appointment will be cancelled & someone will be added into that slot from our wait list.

## 2024-09-03 NOTE — PROGRESS NOTES
The patient came today for follow up regarding: chronic migraines       This is my first encounter with the patient.  The patient was seen by Dr. Herrera.  I was able to review the patient's record, imaging, notes from different physicians and recent events.    Interval history:    The patient denies any worsening of her migraine since her last visit.  Frequency is once every few weeks.  She had frequent headaches recently due to ear infection which was treated with antibiotics.  She is on Topamax twice daily but she is not sure 25 to 50 mg tablets.  No side effects.  She takes OTC as needed.  No daily headache.  Other review of system was unremarkable.        Exam:   Constitutional:   Vitals:    09/03/24 1505   BP: 102/73   Pulse: 75   Weight: 53.5 kg (118 lb)   Height: 1.626 m (5' 4.02\")       General appearance:  Normal development and appear in no acute distress.   Mental Status:   Oriented to person, place, problem, and time.    Memory: Good immediate recall.  Intact remote memory  Normal attention span and concentration.  Language: intact naming, repeating and fluency   Good fund of Knowledge. Aware of current events and vocabulary   Cranial Nerves: CN 2-12 wnl   Musculoskeletal: no focal weakness in UE or LL.   Gait/Posture: steady gait with normal posturing and station.       ROS : A 10-14 system review of constitutional, cardiovascular, respiratory, GI, eyes, , ENT, musculoskeletal, endocrine, hematological, skin, SHEENT, genitourinary, psychiatric and neurologic systems was obtained and updated today which is unremarkable except as mentioned in my HPI      Medical decision making:    A. Problems (any 1)    High:    [] Acute/Chronic Illness/injury posing threat to life or bodily function:    [] Severe exacerbation of chronic illness:      Moderate:    []     1 or more chronic illness with exacerbation, progression or side effect of treatment or  []     2 or more stable chronic illnesses or  []     1 acute

## 2024-11-29 DIAGNOSIS — R06.02 SOB (SHORTNESS OF BREATH): ICD-10-CM

## 2024-11-29 DIAGNOSIS — E78.00 PURE HYPERCHOLESTEROLEMIA: ICD-10-CM

## 2024-11-29 DIAGNOSIS — I50.32 DIASTOLIC CHF, CHRONIC (HCC): ICD-10-CM

## 2024-11-29 DIAGNOSIS — I10 ESSENTIAL HYPERTENSION: ICD-10-CM

## 2024-11-29 RX ORDER — SPIRONOLACTONE 25 MG/1
25 TABLET ORAL DAILY
Qty: 90 TABLET | Refills: 3 | Status: SHIPPED | OUTPATIENT
Start: 2024-11-29

## 2024-11-29 RX ORDER — FUROSEMIDE 40 MG/1
TABLET ORAL
Qty: 90 TABLET | Refills: 3 | Status: SHIPPED | OUTPATIENT
Start: 2024-11-29

## 2024-11-29 NOTE — TELEPHONE ENCOUNTER
Prescription refill    Last OV:06/12/2024    Last Refill:12/08/2023    Labs:05/25/2024    Future Appt:12/16/2024

## 2024-11-30 DIAGNOSIS — G43.709 CHRONIC MIGRAINE W/O AURA W/O STATUS MIGRAINOSUS, NOT INTRACTABLE: ICD-10-CM

## 2024-12-02 NOTE — PROGRESS NOTES
[facility]   Advanced Heart Failure/Pulmonary Hypertension  Cardiac Follow up       Andria Jay  YOB: 1965    Date of Visit:  12/16/24    Chief Complaint   Patient presents with    6 Month Follow-Up    Congestive Heart Failure       History of Present Illness:  Andria Jay is a 59 y.o. female with past medical history significant for dCHF, PHTN, NIRALI, HTN, and CKD. Valsartan was discontinued in May 2018 secondary symptomatic hypotension with it's use (97/52).  She sees Dr. Herrera neurology for headaches.  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 Douglas at [facility].  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 She has been seeing Dr. Herrera for headches and is taking Topiramate 50mg  one in the AM and two tabs at HS; (history of congenital migraines -- may need brain surgery to insert fat). He wanted to prescribe Verapamil for headaches, but her BP runs low, and she takes Midodrine.     Nephrology: Dr. Yip  Neurology: Dr. Metz  6:18/24 Excision right groin lymph node    Pt was last seen in office 6/12/24 and presents today for a follow up of chronic diastolic heart failure. Last EF 55-60% on 6/12/24. Has an appointment with Dr. Delgado for nephrology 12/18/24 for migraines. Taking Mounjaro every 2 weeks because her A1C is better. Midodrine 3 days a week. Rarely takes Lasix. Walking daily for exercise. She gets SOB with inclines, otherwise no complaints. That is her baseline.  No cardiac complaints this visit. Denies SOB. Denies chest pain or palpitations. No edema noted.         NYHA Class II    Allergies   Allergen Reactions    Lisinopril Other (See Comments)     cough    Statins Other (See Comments)     Light-headedness, cp, fast HR    Dilaudid [Hydromorphone Hcl] Nausea And Vomiting    Skelaxin [Metaxalone] Palpitations     Heart beats

## 2024-12-03 RX ORDER — TOPIRAMATE 50 MG/1
TABLET, FILM COATED ORAL
Qty: 180 TABLET | Refills: 1 | Status: SHIPPED | OUTPATIENT
Start: 2024-12-03

## 2024-12-07 ENCOUNTER — HOSPITAL ENCOUNTER (OUTPATIENT)
Age: 59
Discharge: HOME | End: 2024-12-07
Payer: COMMERCIAL

## 2024-12-07 DIAGNOSIS — I50.32 DIASTOLIC CHF, CHRONIC (HCC): ICD-10-CM

## 2024-12-07 DIAGNOSIS — R06.02 SOB (SHORTNESS OF BREATH): ICD-10-CM

## 2024-12-07 DIAGNOSIS — E78.00 PURE HYPERCHOLESTEROLEMIA: ICD-10-CM

## 2024-12-07 DIAGNOSIS — I10 ESSENTIAL HYPERTENSION: ICD-10-CM

## 2024-12-07 LAB
25(OH)D3 SERPL-MCNC: 45.1 NG/ML
ALBUMIN SERPL-MCNC: 4.7 G/DL (ref 3.4–5)
ALBUMIN/GLOB SERPL: 1.7 {RATIO} (ref 1.1–2.2)
ALP SERPL-CCNC: 87 U/L (ref 40–129)
ALT SERPL-CCNC: 14 U/L (ref 10–40)
ANION GAP SERPL CALCULATED.3IONS-SCNC: 14 MMOL/L (ref 3–16)
AST SERPL-CCNC: 26 U/L (ref 15–37)
BILIRUB SERPL-MCNC: 0.3 MG/DL (ref 0–1)
BILIRUB UR QL STRIP.AUTO: NEGATIVE
BUN SERPL-MCNC: 26 MG/DL (ref 7–20)
CALCIUM SERPL-MCNC: 10 MG/DL (ref 8.3–10.6)
CHLORIDE SERPL-SCNC: 107 MMOL/L (ref 99–110)
CHOLEST SERPL-MCNC: 161 MG/DL (ref 0–199)
CLARITY UR: CLEAR
CO2 SERPL-SCNC: 21 MMOL/L (ref 21–32)
COLOR UR: YELLOW
CREAT SERPL-MCNC: 1.2 MG/DL (ref 0.6–1.1)
CREAT UR-MCNC: 173 MG/DL (ref 28–259)
DEPRECATED RDW RBC AUTO: 12.6 % (ref 12.4–15.4)
EPI CELLS #/AREA URNS HPF: NORMAL /HPF (ref 0–5)
GFR SERPLBLD CREATININE-BSD FMLA CKD-EPI: 52 ML/MIN/{1.73_M2}
GLUCOSE SERPL-MCNC: 84 MG/DL (ref 70–99)
GLUCOSE UR STRIP.AUTO-MCNC: NEGATIVE MG/DL
HCT VFR BLD AUTO: 38.1 % (ref 36–48)
HDLC SERPL-MCNC: 57 MG/DL (ref 40–60)
HGB BLD-MCNC: 12.8 G/DL (ref 12–16)
HGB UR QL STRIP.AUTO: NEGATIVE
KETONES UR STRIP.AUTO-MCNC: NEGATIVE MG/DL
LDLC SERPL CALC-MCNC: 90 MG/DL
LEUKOCYTE ESTERASE UR QL STRIP.AUTO: ABNORMAL
MCH RBC QN AUTO: 29.3 PG (ref 26–34)
MCHC RBC AUTO-ENTMCNC: 33.7 G/DL (ref 31–36)
MCV RBC AUTO: 87.1 FL (ref 80–100)
NITRITE UR QL STRIP.AUTO: NEGATIVE
NT-PROBNP SERPL-MCNC: 68 PG/ML (ref 0–124)
PH UR STRIP.AUTO: 6.5 [PH] (ref 5–8)
PLATELET # BLD AUTO: 274 K/UL (ref 135–450)
PMV BLD AUTO: 9.5 FL (ref 5–10.5)
POTASSIUM SERPL-SCNC: 4.3 MMOL/L (ref 3.5–5.1)
PROT SERPL-MCNC: 7.4 G/DL (ref 6.4–8.2)
PROT UR STRIP.AUTO-MCNC: NEGATIVE MG/DL
PROT UR-MCNC: 12.4 MG/DL
PROT/CREAT UR-RTO: 0.1 MG/DL
RBC # BLD AUTO: 4.38 M/UL (ref 4–5.2)
RBC #/AREA URNS HPF: NORMAL /HPF (ref 0–4)
SODIUM SERPL-SCNC: 142 MMOL/L (ref 136–145)
SP GR UR STRIP.AUTO: 1.02 (ref 1–1.03)
TRIGL SERPL-MCNC: 71 MG/DL (ref 0–150)
UA DIPSTICK W REFLEX MICRO PNL UR: YES
URN SPEC COLLECT METH UR: ABNORMAL
UROBILINOGEN UR STRIP-ACNC: 1 E.U./DL
VLDLC SERPL CALC-MCNC: 14 MG/DL
WBC # BLD AUTO: 4.1 K/UL (ref 4–11)
WBC #/AREA URNS HPF: NORMAL /HPF (ref 0–5)

## 2024-12-07 PROCEDURE — 82306 VITAMIN D 25 HYDROXY: CPT

## 2024-12-07 PROCEDURE — 84156 ASSAY OF PROTEIN URINE: CPT

## 2024-12-07 PROCEDURE — 36415 COLL VENOUS BLD VENIPUNCTURE: CPT

## 2024-12-07 PROCEDURE — 80053 COMPREHEN METABOLIC PANEL: CPT

## 2024-12-07 PROCEDURE — 85027 COMPLETE CBC AUTOMATED: CPT

## 2024-12-07 PROCEDURE — 82570 ASSAY OF URINE CREATININE: CPT

## 2024-12-07 PROCEDURE — 83880 ASSAY OF NATRIURETIC PEPTIDE: CPT

## 2024-12-07 PROCEDURE — 81001 URINALYSIS AUTO W/SCOPE: CPT

## 2024-12-07 PROCEDURE — 80061 LIPID PANEL: CPT

## 2024-12-16 ENCOUNTER — OFFICE VISIT (OUTPATIENT)
Dept: CARDIOLOGY CLINIC | Age: 59
End: 2024-12-16

## 2024-12-16 VITALS
HEART RATE: 57 BPM | HEIGHT: 64 IN | DIASTOLIC BLOOD PRESSURE: 60 MMHG | WEIGHT: 124 LBS | BODY MASS INDEX: 21.17 KG/M2 | SYSTOLIC BLOOD PRESSURE: 100 MMHG | OXYGEN SATURATION: 99 %

## 2024-12-16 DIAGNOSIS — E78.00 PURE HYPERCHOLESTEROLEMIA: ICD-10-CM

## 2024-12-16 DIAGNOSIS — I10 ESSENTIAL HYPERTENSION: ICD-10-CM

## 2024-12-16 DIAGNOSIS — I50.32 DIASTOLIC CHF, CHRONIC (HCC): Primary | ICD-10-CM

## 2024-12-16 DIAGNOSIS — R06.02 SOB (SHORTNESS OF BREATH): ICD-10-CM

## 2024-12-16 DIAGNOSIS — I50.32 CHRONIC DIASTOLIC CONGESTIVE HEART FAILURE (HCC): ICD-10-CM

## 2024-12-16 RX ORDER — EZETIMIBE 10 MG/1
10 TABLET ORAL DAILY
Qty: 90 TABLET | Refills: 3 | Status: SHIPPED | OUTPATIENT
Start: 2024-12-16

## 2024-12-16 RX ORDER — MIDODRINE HYDROCHLORIDE 2.5 MG/1
2.5 TABLET ORAL
Qty: 45 TABLET | Refills: 3 | Status: SHIPPED | OUTPATIENT
Start: 2024-12-16

## 2024-12-16 RX ORDER — SPIRONOLACTONE 25 MG/1
25 TABLET ORAL DAILY
Qty: 90 TABLET | Refills: 3 | Status: SHIPPED | OUTPATIENT
Start: 2024-12-16

## 2024-12-16 RX ORDER — PRAVASTATIN SODIUM 10 MG
10 TABLET ORAL DAILY
Qty: 90 TABLET | Refills: 3 | Status: SHIPPED | OUTPATIENT
Start: 2024-12-16

## 2024-12-16 RX ORDER — CARVEDILOL 3.12 MG/1
3.12 TABLET ORAL 2 TIMES DAILY
Qty: 180 TABLET | Refills: 3 | Status: SHIPPED | OUTPATIENT
Start: 2024-12-16

## 2024-12-16 NOTE — PATIENT INSTRUCTIONS
Continue current medications, no changes  Echo with next office visit  Fasting labs with next office visit: CBC, CMP, BNP, and LIPID  Follow up with Nivia Gibson MD in 6 months

## 2025-03-03 DIAGNOSIS — G43.709 CHRONIC MIGRAINE W/O AURA W/O STATUS MIGRAINOSUS, NOT INTRACTABLE: ICD-10-CM

## 2025-03-03 RX ORDER — TOPIRAMATE 50 MG/1
TABLET, FILM COATED ORAL
Qty: 180 TABLET | Refills: 1 | OUTPATIENT
Start: 2025-03-03

## 2025-03-24 DIAGNOSIS — G43.709 CHRONIC MIGRAINE W/O AURA W/O STATUS MIGRAINOSUS, NOT INTRACTABLE: ICD-10-CM

## 2025-03-25 RX ORDER — TOPIRAMATE 50 MG/1
50 TABLET, FILM COATED ORAL 2 TIMES DAILY
Qty: 180 TABLET | Refills: 1 | OUTPATIENT
Start: 2025-03-25

## 2025-04-22 DIAGNOSIS — G43.709 CHRONIC MIGRAINE W/O AURA W/O STATUS MIGRAINOSUS, NOT INTRACTABLE: ICD-10-CM

## 2025-04-25 RX ORDER — TOPIRAMATE 50 MG/1
TABLET, FILM COATED ORAL
Qty: 180 TABLET | Refills: 0 | Status: SHIPPED | OUTPATIENT
Start: 2025-04-25

## 2025-06-03 NOTE — PROGRESS NOTES
[facility]   Advanced Heart Failure/Pulmonary Hypertension  Cardiac Follow up       Andria Jay  YOB: 1965    Date of Visit:  6/16/25    Chief Complaint   Patient presents with    Congestive Heart Failure    Fatigue       History of Present Illness:  Andria Jay is a 59 y.o. female with past medical history significant for dCHF, PHTN, NIRALI, HTN, and CKD. Valsartan was discontinued in May 2018 secondary symptomatic hypotension with it's use (97/52).  She sees Dr. Herrera neurology for headaches.  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 Douglas at [facility].  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 She has been seeing Dr. Herrrea for headches and is taking Topiramate 50mg  one in the AM and two tabs at HS; (history of congenital migraines -- may need brain surgery to insert fat). He wanted to prescribe Verapamil for headaches, but her BP runs low, and she takes Midodrine.     6:18/24 Excision right groin lymph node    Standing Orders: no  Cardiac Stent: no  Cardiac Device: no  Since Last Visit:   2/25/25: BILATERAL THIGHPLASTY with Dr. Ferrer    Pt was last seen in office 12/16/2024 and presents today for a follow up of chronic diastolic heart failure. Last EF 55-60% on 6/12/24. Echo 6/12/25, EF 55-60%. Follows with Trisha Metz MD, neurology for migraines. Pt is on Mounjaro biweekly. She has lost 160 pounds total in her weight loss journey. Follows with Jamee Delgado, nephrology. 6/14/25: , TG 72, HDL 52, LDL 90. BNP 66. Midodrine 3 days a week, she took it today. 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 Complains of chronic SOB, unchanged. Denies chest pain, palpitations, dizziness, and edema today. Complains of constant fatigue.               NYHA Class II    Heart Failure Quality of Life Questionnaire   What brings

## 2025-06-14 ENCOUNTER — HOSPITAL ENCOUNTER (OUTPATIENT)
Age: 60
Discharge: HOME | End: 2025-06-14
Payer: COMMERCIAL

## 2025-06-14 DIAGNOSIS — E78.00 PURE HYPERCHOLESTEROLEMIA: ICD-10-CM

## 2025-06-14 DIAGNOSIS — R06.02 SOB (SHORTNESS OF BREATH): ICD-10-CM

## 2025-06-14 DIAGNOSIS — I10 ESSENTIAL HYPERTENSION: ICD-10-CM

## 2025-06-14 DIAGNOSIS — I50.32 DIASTOLIC CHF, CHRONIC (HCC): ICD-10-CM

## 2025-06-14 LAB
ALBUMIN SERPL-MCNC: 4.3 G/DL (ref 3.4–5)
ALBUMIN/GLOB SERPL: 1.7 {RATIO} (ref 1.1–2.2)
ALP SERPL-CCNC: 71 U/L (ref 40–129)
ALT SERPL-CCNC: 12 U/L (ref 10–40)
ANION GAP SERPL CALCULATED.3IONS-SCNC: 8 MMOL/L (ref 3–16)
AST SERPL-CCNC: 23 U/L (ref 15–37)
BILIRUB SERPL-MCNC: <0.2 MG/DL (ref 0–1)
BUN SERPL-MCNC: 26 MG/DL (ref 7–20)
CALCIUM SERPL-MCNC: 9.7 MG/DL (ref 8.3–10.6)
CHLORIDE SERPL-SCNC: 107 MMOL/L (ref 99–110)
CHOLEST SERPL-MCNC: 156 MG/DL (ref 0–199)
CO2 SERPL-SCNC: 24 MMOL/L (ref 21–32)
CREAT SERPL-MCNC: 1.1 MG/DL (ref 0.6–1.1)
DEPRECATED RDW RBC AUTO: 12.6 % (ref 12.4–15.4)
GFR SERPLBLD CREATININE-BSD FMLA CKD-EPI: 58 ML/MIN/{1.73_M2}
GLUCOSE SERPL-MCNC: 84 MG/DL (ref 70–99)
HCT VFR BLD AUTO: 35.7 % (ref 36–48)
HDLC SERPL-MCNC: 52 MG/DL (ref 40–60)
HGB BLD-MCNC: 11.9 G/DL (ref 12–16)
LDL CHOLESTEROL: 90 MG/DL
MCH RBC QN AUTO: 28.9 PG (ref 26–34)
MCHC RBC AUTO-ENTMCNC: 33.3 G/DL (ref 31–36)
MCV RBC AUTO: 87 FL (ref 80–100)
NT-PROBNP SERPL-MCNC: 66 PG/ML (ref 0–124)
PLATELET # BLD AUTO: 251 K/UL (ref 135–450)
PMV BLD AUTO: 9.4 FL (ref 5–10.5)
POTASSIUM SERPL-SCNC: 4.4 MMOL/L (ref 3.5–5.1)
PROT SERPL-MCNC: 6.9 G/DL (ref 6.4–8.2)
RBC # BLD AUTO: 4.1 M/UL (ref 4–5.2)
SODIUM SERPL-SCNC: 139 MMOL/L (ref 136–145)
TRIGL SERPL-MCNC: 72 MG/DL (ref 0–150)
VLDLC SERPL CALC-MCNC: 14 MG/DL
WBC # BLD AUTO: 3.3 K/UL (ref 4–11)

## 2025-06-14 PROCEDURE — 80053 COMPREHEN METABOLIC PANEL: CPT

## 2025-06-14 PROCEDURE — 83880 ASSAY OF NATRIURETIC PEPTIDE: CPT

## 2025-06-14 PROCEDURE — 36415 COLL VENOUS BLD VENIPUNCTURE: CPT

## 2025-06-14 PROCEDURE — 85027 COMPLETE CBC AUTOMATED: CPT

## 2025-06-14 PROCEDURE — 80061 LIPID PANEL: CPT

## 2025-06-16 ENCOUNTER — HOSPITAL ENCOUNTER (OUTPATIENT)
Age: 60
Discharge: HOME | End: 2025-06-18
Attending: INTERNAL MEDICINE
Payer: COMMERCIAL

## 2025-06-16 ENCOUNTER — OFFICE VISIT (OUTPATIENT)
Dept: CARDIOLOGY CLINIC | Age: 60
End: 2025-06-16
Attending: INTERNAL MEDICINE
Payer: COMMERCIAL

## 2025-06-16 VITALS
BODY MASS INDEX: 21.17 KG/M2 | DIASTOLIC BLOOD PRESSURE: 66 MMHG | WEIGHT: 124 LBS | SYSTOLIC BLOOD PRESSURE: 100 MMHG | HEIGHT: 64 IN

## 2025-06-16 VITALS
BODY MASS INDEX: 21.34 KG/M2 | WEIGHT: 125 LBS | DIASTOLIC BLOOD PRESSURE: 46 MMHG | OXYGEN SATURATION: 97 % | SYSTOLIC BLOOD PRESSURE: 90 MMHG | HEART RATE: 73 BPM | HEIGHT: 64 IN

## 2025-06-16 DIAGNOSIS — I50.32 CHRONIC DIASTOLIC CONGESTIVE HEART FAILURE (HCC): Primary | ICD-10-CM

## 2025-06-16 DIAGNOSIS — I10 ESSENTIAL HYPERTENSION: ICD-10-CM

## 2025-06-16 DIAGNOSIS — R06.02 SOB (SHORTNESS OF BREATH): ICD-10-CM

## 2025-06-16 DIAGNOSIS — E78.2 MIXED HYPERLIPIDEMIA: ICD-10-CM

## 2025-06-16 DIAGNOSIS — I50.32 DIASTOLIC CHF, CHRONIC (HCC): ICD-10-CM

## 2025-06-16 DIAGNOSIS — E78.00 PURE HYPERCHOLESTEROLEMIA: ICD-10-CM

## 2025-06-16 LAB
ECHO AO ROOT DIAM: 3 CM
ECHO AO ROOT INDEX: 1.88 CM/M2
ECHO AV AREA PEAK VELOCITY: 2.4 CM2
ECHO AV AREA VTI: 2.6 CM2
ECHO AV AREA/BSA PEAK VELOCITY: 1.5 CM2/M2
ECHO AV AREA/BSA VTI: 1.6 CM2/M2
ECHO AV MEAN GRADIENT: 2 MMHG
ECHO AV MEAN GRADIENT: 2 MMHG
ECHO AV MEAN VELOCITY: 0.7 M/S
ECHO AV PEAK GRADIENT: 4 MMHG
ECHO AV PEAK VELOCITY: 1 M/S
ECHO AV VELOCITY RATIO: 0.8
ECHO AV VTI: 21.4 CM
ECHO BSA: 1.59 M2
ECHO EST RA PRESSURE: 3 MMHG
ECHO LA AREA 2C: 10.7 CM2
ECHO LA AREA 4C: 8.7 CM2
ECHO LA MAJOR AXIS: 3.8 CM
ECHO LA MINOR AXIS: 4 CM
ECHO LA VOL BP: 19 ML (ref 22–52)
ECHO LA VOL MOD A2C: 22 ML (ref 22–52)
ECHO LA VOL MOD A4C: 15 ML (ref 22–52)
ECHO LA VOL/BSA BIPLANE: 12 ML/M2 (ref 16–34)
ECHO LA VOLUME INDEX MOD A2C: 14 ML/M2 (ref 16–34)
ECHO LA VOLUME INDEX MOD A4C: 9 ML/M2 (ref 16–34)
ECHO LV E' LATERAL VELOCITY: 12.9 CM/S
ECHO LV E' SEPTAL VELOCITY: 8.27 CM/S
ECHO LV EDV A2C: 34 ML
ECHO LV EDV A4C: 55 ML
ECHO LV EDV INDEX A4C: 34 ML/M2
ECHO LV EDV NDEX A2C: 21 ML/M2
ECHO LV EF PHYSICIAN: 55 %
ECHO LV EJECTION FRACTION A2C: 63 %
ECHO LV EJECTION FRACTION A4C: 64 %
ECHO LV EJECTION FRACTION BIPLANE: 64 % (ref 55–100)
ECHO LV ESV A2C: 13 ML
ECHO LV ESV A4C: 20 ML
ECHO LV ESV INDEX A2C: 8 ML/M2
ECHO LV ESV INDEX A4C: 13 ML/M2
ECHO LV FRACTIONAL SHORTENING: 33 % (ref 28–44)
ECHO LV INTERNAL DIMENSION DIASTOLE INDEX: 2.06 CM/M2
ECHO LV INTERNAL DIMENSION DIASTOLIC: 3.3 CM (ref 3.9–5.3)
ECHO LV INTERNAL DIMENSION SYSTOLIC INDEX: 1.38 CM/M2
ECHO LV INTERNAL DIMENSION SYSTOLIC: 2.2 CM
ECHO LV IVSD: 0.7 CM (ref 0.6–0.9)
ECHO LV MASS 2D: 57.1 G (ref 67–162)
ECHO LV MASS INDEX 2D: 35.7 G/M2 (ref 43–95)
ECHO LV POSTERIOR WALL DIASTOLIC: 0.7 CM (ref 0.6–0.9)
ECHO LV RELATIVE WALL THICKNESS RATIO: 0.42
ECHO LVOT AREA: 3.1 CM2
ECHO LVOT AV VTI INDEX: 0.81
ECHO LVOT DIAM: 2 CM
ECHO LVOT MEAN GRADIENT: 1 MMHG
ECHO LVOT PEAK GRADIENT: 2 MMHG
ECHO LVOT PEAK VELOCITY: 0.8 M/S
ECHO LVOT STROKE VOLUME INDEX: 34.1 ML/M2
ECHO LVOT SV: 54.6 ML
ECHO LVOT VTI: 17.4 CM
ECHO MV A VELOCITY: 0.67 M/S
ECHO MV AREA VTI: 2.5 CM2
ECHO MV E VELOCITY: 0.5 M/S
ECHO MV E/A RATIO: 0.75
ECHO MV E/E' LATERAL: 3.88
ECHO MV E/E' RATIO (AVERAGED): 4.96
ECHO MV E/E' SEPTAL: 6.05
ECHO MV LVOT VTI INDEX: 1.25
ECHO MV MAX VELOCITY: 0.8 M/S
ECHO MV MEAN GRADIENT: 1 MMHG
ECHO MV MEAN VELOCITY: 0.5 M/S
ECHO MV PEAK GRADIENT: 3 MMHG
ECHO MV VTI: 21.7 CM
ECHO PV MAX VELOCITY: 0.6 M/S
ECHO PV PEAK GRADIENT: 2 MMHG
ECHO RA AREA 4C: 6.9 CM2
ECHO RA END SYSTOLIC VOLUME APICAL 4 CHAMBER INDEX BSA: 8 ML/M2
ECHO RA VOLUME: 13 ML
ECHO RIGHT VENTRICULAR SYSTOLIC PRESSURE (RVSP): 16 MMHG
ECHO RV BASAL DIMENSION: 3.1 CM
ECHO RV FREE WALL PEAK S': 11.2 CM/S
ECHO RV LONGITUDINAL DIMENSION: 5.4 CM
ECHO RV MID DIMENSION: 2.1 CM
ECHO RV TAPSE: 2.3 CM (ref 1.7–?)
ECHO TV REGURGITANT MAX VELOCITY: 1.82 M/S
ECHO TV REGURGITANT PEAK GRADIENT: 13 MMHG

## 2025-06-16 PROCEDURE — 93306 TTE W/DOPPLER COMPLETE: CPT

## 2025-06-16 PROCEDURE — 3078F DIAST BP <80 MM HG: CPT | Performed by: INTERNAL MEDICINE

## 2025-06-16 PROCEDURE — 3074F SYST BP LT 130 MM HG: CPT | Performed by: INTERNAL MEDICINE

## 2025-06-16 PROCEDURE — G2211 COMPLEX E/M VISIT ADD ON: HCPCS | Performed by: INTERNAL MEDICINE

## 2025-06-16 PROCEDURE — 93306 TTE W/DOPPLER COMPLETE: CPT | Performed by: INTERNAL MEDICINE

## 2025-06-16 PROCEDURE — 99214 OFFICE O/P EST MOD 30 MIN: CPT | Performed by: INTERNAL MEDICINE

## 2025-06-16 NOTE — PATIENT INSTRUCTIONS
Continue current medications, no changes  Schedule echo with next office visit  Fasting labs (FASTING for 8-10 hours) with next office visit: CBC, CMP, BNP, and LIPID  Follow up with Nivia Gisbon MD in 1 year  Call my office for any worsening symptoms such as shortness of breath, chest pain, sudden weight gain or loss, or any increased swelling: Phone: [phone] or Fax: [phone]

## 2025-06-18 ENCOUNTER — RESULTS FOLLOW-UP (OUTPATIENT)
Dept: CARDIOLOGY CLINIC | Age: 60
End: 2025-06-18

## 2025-08-12 ENCOUNTER — HOSPITAL ENCOUNTER (OUTPATIENT)
Dept: WOMENS IMAGING | Age: 60
Discharge: HOME | End: 2025-08-12
Payer: COMMERCIAL

## 2025-08-12 VITALS — BODY MASS INDEX: 21.17 KG/M2 | HEIGHT: 64 IN | WEIGHT: 124 LBS

## 2025-08-12 DIAGNOSIS — Z12.31 VISIT FOR SCREENING MAMMOGRAM: ICD-10-CM

## 2025-08-12 PROCEDURE — 77067 SCR MAMMO BI INCL CAD: CPT

## 2025-09-02 ENCOUNTER — OFFICE VISIT (OUTPATIENT)
Dept: NEUROLOGY | Age: 60
End: 2025-09-02

## 2025-09-02 DIAGNOSIS — G43.709 CHRONIC MIGRAINE W/O AURA W/O STATUS MIGRAINOSUS, NOT INTRACTABLE: Primary | ICD-10-CM
